# Patient Record
Sex: FEMALE | Race: WHITE | NOT HISPANIC OR LATINO | Employment: FULL TIME | URBAN - METROPOLITAN AREA
[De-identification: names, ages, dates, MRNs, and addresses within clinical notes are randomized per-mention and may not be internally consistent; named-entity substitution may affect disease eponyms.]

---

## 2017-10-24 ENCOUNTER — DOCUMENTATION ONLY (OUTPATIENT)
Dept: FAMILY MEDICINE | Facility: CLINIC | Age: 27
End: 2017-10-24

## 2017-10-24 NOTE — PROGRESS NOTES
Pre-Visit Chart Review  For Appointment Scheduled on 10/25/17    Health Maintenance Due   Topic Date Due    Lipid Panel  1990    TETANUS VACCINE  11/08/2006    Pap Smear  11/08/2009    Influenza Vaccine  08/01/2017

## 2017-10-27 ENCOUNTER — OFFICE VISIT (OUTPATIENT)
Dept: FAMILY MEDICINE | Facility: CLINIC | Age: 27
End: 2017-10-27
Payer: COMMERCIAL

## 2017-10-27 ENCOUNTER — LAB VISIT (OUTPATIENT)
Dept: LAB | Facility: HOSPITAL | Age: 27
End: 2017-10-27
Attending: NURSE PRACTITIONER
Payer: COMMERCIAL

## 2017-10-27 VITALS
HEIGHT: 67 IN | HEART RATE: 63 BPM | WEIGHT: 135.56 LBS | BODY MASS INDEX: 21.28 KG/M2 | DIASTOLIC BLOOD PRESSURE: 73 MMHG | TEMPERATURE: 98 F | SYSTOLIC BLOOD PRESSURE: 105 MMHG

## 2017-10-27 DIAGNOSIS — K58.9 IRRITABLE BOWEL SYNDROME, UNSPECIFIED TYPE: ICD-10-CM

## 2017-10-27 DIAGNOSIS — Z00.00 ANNUAL PHYSICAL EXAM: ICD-10-CM

## 2017-10-27 DIAGNOSIS — B35.1 ONYCHOMYCOSIS: ICD-10-CM

## 2017-10-27 DIAGNOSIS — Z00.00 ANNUAL PHYSICAL EXAM: Primary | ICD-10-CM

## 2017-10-27 LAB
ALBUMIN SERPL BCP-MCNC: 4.3 G/DL
ALP SERPL-CCNC: 36 U/L
ALT SERPL W/O P-5'-P-CCNC: 10 U/L
ANION GAP SERPL CALC-SCNC: 9 MMOL/L
AST SERPL-CCNC: 19 U/L
BASOPHILS # BLD AUTO: 0.05 K/UL
BASOPHILS NFR BLD: 0.7 %
BILIRUB SERPL-MCNC: 1.1 MG/DL
BUN SERPL-MCNC: 16 MG/DL
CALCIUM SERPL-MCNC: 9.8 MG/DL
CHLORIDE SERPL-SCNC: 106 MMOL/L
CHOLEST SERPL-MCNC: 198 MG/DL
CHOLEST/HDLC SERPL: 2.9 {RATIO}
CO2 SERPL-SCNC: 25 MMOL/L
CREAT SERPL-MCNC: 0.8 MG/DL
DIFFERENTIAL METHOD: ABNORMAL
EOSINOPHIL # BLD AUTO: 0.3 K/UL
EOSINOPHIL NFR BLD: 4.3 %
ERYTHROCYTE [DISTWIDTH] IN BLOOD BY AUTOMATED COUNT: 12.2 %
EST. GFR  (AFRICAN AMERICAN): >60 ML/MIN/1.73 M^2
EST. GFR  (NON AFRICAN AMERICAN): >60 ML/MIN/1.73 M^2
GLUCOSE SERPL-MCNC: 82 MG/DL
HCT VFR BLD AUTO: 35.7 %
HDLC SERPL-MCNC: 69 MG/DL
HDLC SERPL: 34.8 %
HGB BLD-MCNC: 11.9 G/DL
IMM GRANULOCYTES # BLD AUTO: 0.04 K/UL
IMM GRANULOCYTES NFR BLD AUTO: 0.6 %
LDLC SERPL CALC-MCNC: 118 MG/DL
LYMPHOCYTES # BLD AUTO: 2.1 K/UL
LYMPHOCYTES NFR BLD: 29.9 %
MCH RBC QN AUTO: 30.7 PG
MCHC RBC AUTO-ENTMCNC: 33.3 G/DL
MCV RBC AUTO: 92 FL
MONOCYTES # BLD AUTO: 0.7 K/UL
MONOCYTES NFR BLD: 10.6 %
NEUTROPHILS # BLD AUTO: 3.8 K/UL
NEUTROPHILS NFR BLD: 53.9 %
NONHDLC SERPL-MCNC: 129 MG/DL
NRBC BLD-RTO: 0 /100 WBC
PLATELET # BLD AUTO: 232 K/UL
PMV BLD AUTO: 11.5 FL
POTASSIUM SERPL-SCNC: 3.8 MMOL/L
PROT SERPL-MCNC: 7.2 G/DL
RBC # BLD AUTO: 3.88 M/UL
SODIUM SERPL-SCNC: 140 MMOL/L
TRIGL SERPL-MCNC: 55 MG/DL
WBC # BLD AUTO: 6.98 K/UL

## 2017-10-27 PROCEDURE — 80061 LIPID PANEL: CPT

## 2017-10-27 PROCEDURE — 36415 COLL VENOUS BLD VENIPUNCTURE: CPT | Mod: PO

## 2017-10-27 PROCEDURE — 80053 COMPREHEN METABOLIC PANEL: CPT

## 2017-10-27 PROCEDURE — 99385 PREV VISIT NEW AGE 18-39: CPT | Mod: S$GLB,,, | Performed by: NURSE PRACTITIONER

## 2017-10-27 PROCEDURE — 99999 PR PBB SHADOW E&M-EST. PATIENT-LVL III: CPT | Mod: PBBFAC,,, | Performed by: NURSE PRACTITIONER

## 2017-10-27 PROCEDURE — 85025 COMPLETE CBC W/AUTO DIFF WBC: CPT

## 2017-10-27 RX ORDER — PROPRANOLOL HYDROCHLORIDE 40 MG/1
20 TABLET ORAL DAILY PRN
COMMUNITY
End: 2019-12-10

## 2017-10-27 RX ORDER — TERBINAFINE HYDROCHLORIDE 250 MG/1
250 TABLET ORAL DAILY
Qty: 30 TABLET | Refills: 3 | Status: SHIPPED | OUTPATIENT
Start: 2017-10-27 | End: 2018-03-15

## 2017-10-27 RX ORDER — DICYCLOMINE HYDROCHLORIDE 10 MG/1
10 CAPSULE ORAL
Qty: 120 CAPSULE | Refills: 0 | Status: SHIPPED | OUTPATIENT
Start: 2017-10-27 | End: 2017-11-26

## 2017-10-27 NOTE — PATIENT INSTRUCTIONS
Prevention Guidelines, Women Ages 18 to 39  Screening tests and vaccines are an important part of managing your health. Health counseling is essential, too. Below are guidelines for these, for women ages 18 to 39. Talk with your healthcare provider to make sure youre up-to-date on what you need.  Screening Who needs it How often   Alcohol misuse All women in this age group At routine exams   Blood pressure All women in this age group Every 2 years if your blood pressure is less than 120/80 mm Hg; yearly if your systolic blood pressure is 120 to 139 mm Hg, or your diastolic blood pressure reading is 80 to 89 mm Hg   Breast cancer All women in this age group should talk with their healthcare providers about the need for clinical breast exams (CBE)1 Clinical breast exam every 3 years1   Cervical cancer Women ages 21 and older Women between ages 21 and 29 should have a Pap test every 3 years; women between ages 30 and 65 are advised to have a Pap test plus an HPV test every 5 years   Chlamydia Sexually active women ages 24 and younger, and women at increased risk for infection Every 3 years if you're at risk or have symptoms   Depression All women in this age group At routine exams   Diabetes mellitus, type 2 Adults with no symptoms who are overweight or obese and have 1 or more other risk factors for diabetes At least every 3 years   Gonorrhea Sexually active women at increased risk for infection At routine exams   Hepatitis C Anyone at increased risk At routine exams   HIV All women At routine exams   Obesity All women in this age group At routine exams   Syphilis Women at increased risk for infection - talk with your healthcare provider At routine exams   Tuberculosis Women at increased risk for infection - talk with your healthcare provider Ask your healthcare provider   Vision All women in this age group At least 1 complete exam in your 20s, and 2 in your 30s   Vaccine2 Who needs it How often   Chickenpox  (varicella) All women in this age group who have no record of this infection or vaccine 2 doses; the second dose should be given 4 to 8 weeks after the first dose   Hepatitis A Women at increased risk for infection - talk with your healthcare provider 2 doses given at least 6 months apart   Hepatitis B Women at increased risk for infection - talk with your healthcare provider 3 doses over 6 months; second dose should be given 1 month after the first dose; the third dose should be given at least 2 months after the second dose and at least 4 months after the first dose   Haemophilus influenzae Type B (HIB) Women at increased risk for infection - talk with your healthcare provider 1 to 3 doses   Human papillomavirus (HPV) All women in this age group up to age 26 3 doses; the second dose should be given 1 to 2 months after the first dose and the third dose given 6 months after the first dose   Influenza (flu) All women in this age group Once a year   Measles, mumps, rubella (MMR) All women in this age group who have no record of these infections or vaccines 1 or 2 doses   Meningococcal Women at increased risk for infection - talk with your healthcare provider 1 or more doses   Pneumococcal conjugate vaccine (PCV13) and pneumococcal polysaccharide vaccine (PPSV23) Women at increased risk for infection - talk with your healthcare provider PCV13: 1 dose ages 19 to 65 (protects against 13 types of pneumococcal bacteria)  PPSV23: 1 to 2 doses through age 64, or 1 dose at 65 or older (protects against 23 types of pneumococcal bacteria)      Tetanus/diphtheria/pertussis (Td/Tdap) booster All women in this age group Td every 10 years, or a one-time dose of Tdap instead of a Td booster after age 18, then Td every 10 years   Counseling Who needs it How often   BRCA gene mutation testing for breast and ovarian cancer susceptibility Women with increased risk for having gene mutation When your risk is known   Breast cancer and  chemoprevention Women at high risk for breast cancer When your risk is known   Diet and exercise Women who are overweight or obese When diagnosed, and then at routine exams   Domestic violence Women at the age in which they are able to have children At routine exams   Sexually transmitted infection prevention Women who are sexually active At routine exams   Skin cancer Prevention of skin cancer in fair-skinned adults through age 24 At routine exams   Use of tobacco and the health effects it can cause All women in this age group Every visit   1According to the ACS, women ages 20 to 39 years should have a clinical breast exam (CBE) as part of their routine health exam every 3 years. Breast self-exams are an option for women starting in their 20s. But the  USPSTF does not recommend CBE.  2Those who are 18 years old and not up-to-date on their childhood vaccines should get all appropriate catch-up vaccines recommended by the CDC.  Date Last Reviewed: 8/27/2015  © 1278-5408 [employer] [address] [place] [address], [place] [address]. All rights reserved. This information is not intended as a substitute for professional medical care. Always follow your healthcare professional's instructions.

## 2017-10-27 NOTE — PROGRESS NOTES
Subjective:       Patient ID: Annalise Nava is a 26 y.o. female.    Chief Complaint: Annual Exam    Ms. Nava presents to the clinic today for annual exam.  She has history of tremor which is controlled with propranolol.  She takes this when she has anxiety.  She complains of left great toe nail brittle and yellow.  She has history of onychomycosis and would like to take terbinafine which she has taken in the past for onychomycosis.  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  She works night shift.  She exercises on her off weeks by lifting weights.  She eats a Paleo diet.  She complains of occasional diarrhea which occurs when she is anxious or stressed.  She also has occasional cramping pains located on the LLQ.  This woke her up one time at night but has not happened since.  No family history IBD.  No blood in the stool.  Pain went away on its own.  She is up to date on Tdap which she had in college.  She had pap smear one year ago.  She had flu shot at work.       Review of Systems   Constitutional: Negative for activity change, chills, fatigue and unexpected weight change.   HENT: Negative for hearing loss, rhinorrhea and trouble swallowing.    Eyes: Negative for discharge and visual disturbance.   Respiratory: Negative for cough, chest tightness, shortness of breath and wheezing.    Cardiovascular: Positive for palpitations (when drinking caffeine or anxious). Negative for chest pain.   Gastrointestinal: Positive for abdominal pain (cramping, once every 3 mos). Negative for anal bleeding, blood in stool, constipation, diarrhea and vomiting.   Endocrine: Negative for polydipsia and polyuria.   Genitourinary: Negative for difficulty urinating, dysuria, hematuria and menstrual problem.   Musculoskeletal: Negative for arthralgias, joint swelling and neck pain.   Neurological: Positive for headaches. Negative for weakness.   Psychiatric/Behavioral: Negative for confusion and dysphoric mood.        Objective:      Physical Exam   Constitutional: She is oriented to person, place, and time. She appears well-nourished. No distress.   HENT:   Head: Normocephalic and atraumatic.   Right Ear: External ear normal.   Left Ear: External ear normal.   Mouth/Throat: Oropharynx is clear and moist. No oropharyngeal exudate.   Eyes: Pupils are equal, round, and reactive to light. Right eye exhibits no discharge. Left eye exhibits no discharge.   Neck: Neck supple. No thyromegaly present.   Cardiovascular: Normal rate and regular rhythm.  Exam reveals no gallop and no friction rub.    No murmur heard.  Pulmonary/Chest: Effort normal and breath sounds normal. No respiratory distress. She has no wheezes. She has no rales.   Abdominal: Soft. She exhibits no distension. There is no tenderness.   Lymphadenopathy:     She has no cervical adenopathy.   Neurological: She is alert and oriented to person, place, and time. Coordination normal.   Skin: Skin is warm and dry.   Psychiatric: She has a normal mood and affect. Her behavior is normal. Thought content normal.   Vitals reviewed.          Current Outpatient Prescriptions:     multivitamin with minerals tablet, Take 1 tablet by mouth once daily., Disp: , Rfl:     propranolol (INDERAL) 40 MG tablet, Take 20 mg by mouth daily as needed., Disp: , Rfl:     dicyclomine (BENTYL) 10 MG capsule, Take 1 capsule (10 mg total) by mouth 4 (four) times daily before meals and nightly., Disp: 120 capsule, Rfl: 0    terbinafine HCl (LAMISIL) 250 mg tablet, Take 1 tablet (250 mg total) by mouth once daily., Disp: 30 tablet, Rfl: 3  Assessment:       1. Annual physical exam    2. Onychomycosis    3. Irritable bowel syndrome, unspecified type        Plan:       Annual physical exam  -     CBC auto differential; Future; Expected date: 10/27/2017  -     Comprehensive metabolic panel; Future; Expected date: 10/27/2017  -     Lipid panel; Future; Expected date: 10/27/2017    Onychomycosis  Take  for 12 weeks.    Hepatic function panel monthly during treatment.  -     terbinafine HCl (LAMISIL) 250 mg tablet; Take 1 tablet (250 mg total) by mouth once daily.  Dispense: 30 tablet; Refill: 3    Irritable bowel syndrome, unspecified type  GI referral if pain or diarrhea continues to wake up at night.  -     dicyclomine (BENTYL) 10 MG capsule; Take 1 capsule (10 mg total) by mouth 4 (four) times daily before meals and nightly.  Dispense: 120 capsule; Refill: 0

## 2017-10-28 DIAGNOSIS — Z51.81 ENCOUNTER FOR MEDICATION MONITORING: Primary | ICD-10-CM

## 2017-11-16 ENCOUNTER — PATIENT MESSAGE (OUTPATIENT)
Dept: FAMILY MEDICINE | Facility: CLINIC | Age: 27
End: 2017-11-16

## 2017-11-17 DIAGNOSIS — D50.0 IRON DEFICIENCY ANEMIA DUE TO CHRONIC BLOOD LOSS: Primary | ICD-10-CM

## 2017-12-04 ENCOUNTER — PATIENT MESSAGE (OUTPATIENT)
Dept: FAMILY MEDICINE | Facility: CLINIC | Age: 27
End: 2017-12-04

## 2017-12-04 DIAGNOSIS — R10.9 ABDOMINAL PAIN, UNSPECIFIED ABDOMINAL LOCATION: Primary | ICD-10-CM

## 2017-12-05 ENCOUNTER — LAB VISIT (OUTPATIENT)
Dept: LAB | Facility: HOSPITAL | Age: 27
End: 2017-12-05
Attending: NURSE PRACTITIONER
Payer: COMMERCIAL

## 2017-12-05 DIAGNOSIS — Z51.81 ENCOUNTER FOR MEDICATION MONITORING: ICD-10-CM

## 2017-12-05 DIAGNOSIS — D50.0 IRON DEFICIENCY ANEMIA DUE TO CHRONIC BLOOD LOSS: ICD-10-CM

## 2017-12-05 LAB
ALBUMIN SERPL BCP-MCNC: 4.5 G/DL
ALP SERPL-CCNC: 39 U/L
ALT SERPL W/O P-5'-P-CCNC: 16 U/L
AST SERPL-CCNC: 21 U/L
BASOPHILS # BLD AUTO: 0 K/UL
BASOPHILS NFR BLD: 0.5 %
BILIRUB DIRECT SERPL-MCNC: 0.3 MG/DL
BILIRUB SERPL-MCNC: 0.8 MG/DL
DIFFERENTIAL METHOD: ABNORMAL
EOSINOPHIL # BLD AUTO: 0.1 K/UL
EOSINOPHIL NFR BLD: 1.3 %
ERYTHROCYTE [DISTWIDTH] IN BLOOD BY AUTOMATED COUNT: 12.7 %
FERRITIN SERPL-MCNC: 75 NG/ML
HCT VFR BLD AUTO: 35.5 %
HGB BLD-MCNC: 12 G/DL
IRON SERPL-MCNC: 80 UG/DL
LYMPHOCYTES # BLD AUTO: 1.2 K/UL
LYMPHOCYTES NFR BLD: 14.8 %
MCH RBC QN AUTO: 30.5 PG
MCHC RBC AUTO-ENTMCNC: 33.8 G/DL
MCV RBC AUTO: 90 FL
MONOCYTES # BLD AUTO: 0.6 K/UL
MONOCYTES NFR BLD: 7.9 %
NEUTROPHILS # BLD AUTO: 6 K/UL
NEUTROPHILS NFR BLD: 75.5 %
PLATELET # BLD AUTO: 216 K/UL
PMV BLD AUTO: 8.7 FL
PROT SERPL-MCNC: 7.5 G/DL
RBC # BLD AUTO: 3.93 M/UL
SATURATED IRON: 21 %
TOTAL IRON BINDING CAPACITY: 373 UG/DL
TRANSFERRIN SERPL-MCNC: 252 MG/DL
WBC # BLD AUTO: 7.9 K/UL

## 2017-12-05 PROCEDURE — 36415 COLL VENOUS BLD VENIPUNCTURE: CPT

## 2017-12-05 PROCEDURE — 83540 ASSAY OF IRON: CPT

## 2017-12-05 PROCEDURE — 82728 ASSAY OF FERRITIN: CPT

## 2017-12-05 PROCEDURE — 80076 HEPATIC FUNCTION PANEL: CPT

## 2017-12-05 PROCEDURE — 85025 COMPLETE CBC W/AUTO DIFF WBC: CPT

## 2017-12-06 ENCOUNTER — PATIENT MESSAGE (OUTPATIENT)
Dept: FAMILY MEDICINE | Facility: CLINIC | Age: 27
End: 2017-12-06

## 2017-12-06 ENCOUNTER — TELEPHONE (OUTPATIENT)
Dept: FAMILY MEDICINE | Facility: CLINIC | Age: 27
End: 2017-12-06

## 2017-12-22 ENCOUNTER — OFFICE VISIT (OUTPATIENT)
Dept: GASTROENTEROLOGY | Facility: CLINIC | Age: 27
End: 2017-12-22
Payer: COMMERCIAL

## 2017-12-22 VITALS
BODY MASS INDEX: 21.45 KG/M2 | DIASTOLIC BLOOD PRESSURE: 84 MMHG | SYSTOLIC BLOOD PRESSURE: 109 MMHG | HEIGHT: 67 IN | WEIGHT: 136.69 LBS | HEART RATE: 76 BPM

## 2017-12-22 DIAGNOSIS — K58.0 IRRITABLE BOWEL SYNDROME WITH DIARRHEA: ICD-10-CM

## 2017-12-22 DIAGNOSIS — R19.7 DIARRHEA, UNSPECIFIED TYPE: ICD-10-CM

## 2017-12-22 DIAGNOSIS — R10.9 ABDOMINAL PAIN, UNSPECIFIED ABDOMINAL LOCATION: Primary | ICD-10-CM

## 2017-12-22 PROCEDURE — 99243 OFF/OP CNSLTJ NEW/EST LOW 30: CPT | Mod: S$GLB,,, | Performed by: INTERNAL MEDICINE

## 2017-12-22 PROCEDURE — 99999 PR PBB SHADOW E&M-EST. PATIENT-LVL III: CPT | Mod: PBBFAC,,, | Performed by: INTERNAL MEDICINE

## 2017-12-22 RX ORDER — DICYCLOMINE HYDROCHLORIDE 10 MG/1
10 CAPSULE ORAL
COMMUNITY
End: 2018-04-27 | Stop reason: SDUPTHER

## 2017-12-22 NOTE — PROGRESS NOTES
Subjective:       Patient ID: Annalise Nava is a 27 y.o. female.    This patient is new to me.  Referring practitioner:  Latricia Ncihols for abdominal pain.      Chief Complaint: Abdominal Cramping (loose stools, with intermittent abdominal cramping )    Abdominal Cramping   This is a new problem. The current episode started more than 1 year ago. The onset quality is undetermined. The problem occurs intermittently. Duration: 1-2 days. The problem has been resolved. The pain is located in the LLQ and LUQ. The pain is at a severity of 6/10. The pain is moderate. The quality of the pain is aching and cramping. The abdominal pain does not radiate. Associated symptoms include diarrhea (currently resolved but will have loose more frequent BMs during episode.  Normal bowel habit is 4 times per day with no weight changes or bleeding). Pertinent negatives include no arthralgias, dysuria, fever, headaches, hematuria, myalgias, nausea or vomiting. Nothing aggravates the pain. The pain is relieved by nothing. Treatments tried: diet modification, bentyl. The treatment provided mild relief. Prior diagnostic workup includes GI consult. Her past medical history is significant for irritable bowel syndrome. There is no history of abdominal surgery.     Review of Systems   Constitutional: Negative for chills, fatigue and fever.   HENT: Negative for sore throat and trouble swallowing.    Respiratory: Negative for cough, shortness of breath and wheezing.    Cardiovascular: Negative for chest pain and palpitations.   Gastrointestinal: Positive for abdominal pain (currently resolved) and diarrhea (currently resolved but will have loose more frequent BMs during episode.  Normal bowel habit is 4 times per day with no weight changes or bleeding). Negative for blood in stool, nausea and vomiting.   Genitourinary: Negative for dysuria and hematuria.   Musculoskeletal: Negative for arthralgias and myalgias.   Skin: Negative for color change  "and rash.   Neurological: Negative for dizziness and headaches.   Hematological: Negative for adenopathy.   Psychiatric/Behavioral: Negative for confusion. The patient is not nervous/anxious.    All other systems reviewed and are negative.      Objective:       Vitals:    12/22/17 1042   BP: 109/84   Pulse: 76   Weight: 62 kg (136 lb 11 oz)   Height: 5' 7" (1.702 m)       Physical Exam   Constitutional: She appears well-developed and well-nourished.   HENT:   Head: Normocephalic and atraumatic.   Eyes: Pupils are equal, round, and reactive to light. No scleral icterus.   Neck: Normal range of motion.   Cardiovascular: Normal rate and regular rhythm.    No murmur heard.  Pulmonary/Chest: Effort normal and breath sounds normal. She has no wheezes.   Abdominal: Soft. Bowel sounds are normal. She exhibits no distension. There is no tenderness.   Musculoskeletal: She exhibits no edema or tenderness.   Lymphadenopathy:     She has no cervical adenopathy.   Neurological: She is alert.   Skin: Skin is warm and dry. No rash noted.         Lab Results   Component Value Date    WBC 7.90 12/05/2017    HGB 12.0 12/05/2017    HCT 35.5 (L) 12/05/2017    MCV 90 12/05/2017     12/05/2017       CMP  Sodium   Date Value Ref Range Status   10/27/2017 140 136 - 145 mmol/L Final     Potassium   Date Value Ref Range Status   10/27/2017 3.8 3.5 - 5.1 mmol/L Final     Chloride   Date Value Ref Range Status   10/27/2017 106 95 - 110 mmol/L Final     CO2   Date Value Ref Range Status   10/27/2017 25 23 - 29 mmol/L Final     Glucose   Date Value Ref Range Status   10/27/2017 82 70 - 110 mg/dL Final     BUN, Bld   Date Value Ref Range Status   10/27/2017 16 6 - 20 mg/dL Final     Creatinine   Date Value Ref Range Status   10/27/2017 0.8 0.5 - 1.4 mg/dL Final     Calcium   Date Value Ref Range Status   10/27/2017 9.8 8.7 - 10.5 mg/dL Final     Total Protein   Date Value Ref Range Status   12/05/2017 7.5 6.0 - 8.4 g/dL Final     Albumin "   Date Value Ref Range Status   12/05/2017 4.5 3.5 - 5.2 g/dL Final     Total Bilirubin   Date Value Ref Range Status   12/05/2017 0.8 0.1 - 1.0 mg/dL Final     Comment:     For infants and newborns, interpretation of results should be based  on gestational age, weight and in agreement with clinical  observations.  Premature Infant recommended reference ranges:  Up to 24 hours.............<8.0 mg/dL  Up to 48 hours............<12.0 mg/dL  3-5 days..................<15.0 mg/dL  6-29 days.................<15.0 mg/dL       Alkaline Phosphatase   Date Value Ref Range Status   12/05/2017 39 (L) 55 - 135 U/L Final     AST   Date Value Ref Range Status   12/05/2017 21 10 - 40 U/L Final     ALT   Date Value Ref Range Status   12/05/2017 16 10 - 44 U/L Final     Anion Gap   Date Value Ref Range Status   10/27/2017 9 8 - 16 mmol/L Final     eGFR if    Date Value Ref Range Status   10/27/2017 >60.0 >60 mL/min/1.73 m^2 Final     eGFR if non    Date Value Ref Range Status   10/27/2017 >60.0 >60 mL/min/1.73 m^2 Final     Comment:     Calculation used to obtain the estimated glomerular filtration  rate (eGFR) is the CKD-EPI equation. Since race is unknown   in our information system, the eGFR values for   -American and Non--American patients are given   for each creatinine result.         ECG was independently visualized and reviewed by me and showed NSR with sinus arrhythmia.      Old records from [facility] reviewed and are as summarized above in the HPI.      Assessment:       1. Abdominal pain, unspecified abdominal location    2. Diarrhea, unspecified type    3. Irritable bowel syndrome with diarrhea        Plan:       1.  Check celiac screen  2.  Check stool studies if symptoms recur  3.  Increase bentyl to 20 mg PRN four times daily  4.  Avoid NSAIDs  5.  If diarrhea persists, consider colonoscopy with biopsy but patient wishes to defer for now given the absence of alarm  features  6.  Follow up PRN  7.  Communication will be sent to the referring practitioner regarding my assessment and plan on this patient via EPIC.

## 2017-12-22 NOTE — LETTER
December 22, 2017        Latricia Nichols, [facility]  [address]  [place] [address]             [place] [facility]  [address]  [place] [address]  Phone: [phone]   Patient: Annalise Nava   MR Number: [id number]   YOB: 1990   Date of Visit: 12/22/2017       Dear Dr. Nichols:    Thank you for referring Annalise Nava to me for evaluation. Below are the relevant portions of my assessment and plan of care.            If you have questions, please do not hesitate to call me. I look forward to following Annalise along with you.    Sincerely,      Deejay Cortes MD           CC  No Recipients

## 2018-03-15 ENCOUNTER — OFFICE VISIT (OUTPATIENT)
Dept: PODIATRY | Facility: CLINIC | Age: 28
End: 2018-03-15
Payer: COMMERCIAL

## 2018-03-15 ENCOUNTER — TELEPHONE (OUTPATIENT)
Dept: PODIATRY | Facility: CLINIC | Age: 28
End: 2018-03-15

## 2018-03-15 VITALS — HEIGHT: 67 IN | WEIGHT: 134.94 LBS | BODY MASS INDEX: 21.18 KG/M2

## 2018-03-15 DIAGNOSIS — L03.031 PARONYCHIA, TOE, RIGHT: ICD-10-CM

## 2018-03-15 DIAGNOSIS — L60.0 INGROWN NAIL: Primary | ICD-10-CM

## 2018-03-15 DIAGNOSIS — M79.674 TOE PAIN, RIGHT: ICD-10-CM

## 2018-03-15 PROCEDURE — 99999 PR PBB SHADOW E&M-EST. PATIENT-LVL III: CPT | Mod: PBBFAC,,, | Performed by: PODIATRIST

## 2018-03-15 PROCEDURE — 99203 OFFICE O/P NEW LOW 30 MIN: CPT | Mod: S$GLB,,, | Performed by: PODIATRIST

## 2018-03-15 RX ORDER — TOBRAMYCIN 3 MG/ML
SOLUTION/ DROPS OPHTHALMIC
Qty: 5 ML | Refills: 3 | Status: SHIPPED | OUTPATIENT
Start: 2018-03-15 | End: 2018-04-27

## 2018-03-15 NOTE — TELEPHONE ENCOUNTER
----- Message from Roland Kennedy sent at 3/15/2018  3:35 PM CDT -----  Contact: Axel cline/Walter Reyna calling to verify a prescription that was sent over for pt  Call Back#[phone]  Thanks

## 2018-03-15 NOTE — LETTER
March 15, 2018      Other  [address] [place] [address]           [place] [facility]  [address]  [place] [address]  Phone: [phone]          Patient: Annalise Nava   MR Number: [id number]   YOB: 1990   Date of Visit: 3/15/2018       Dear Other:    Thank you for referring Annalise Nava to me for evaluation. Attached you will find relevant portions of my assessment and plan of care.    If you have questions, please do not hesitate to call me. I look forward to following Annalise Nava along with you.    Sincerely,    Jose Bocanegra, LORETA    Enclosure  CC:  No Recipients    If you would like to receive this communication electronically, please contact [email or web address] or [phone] to request more information on [facility] [facility] access.    For providers and/or their staff who would like to refer a patient to [facility], please contact us through our one-stop-shop provider referral line, [facility], at [phone].    If you feel you have received this communication in error or would no longer like to receive these types of communications, please e-mail [email or web address]

## 2018-03-15 NOTE — TELEPHONE ENCOUNTER
Spoke with Axel. Axel wanted to verify the instructions of the rx. Informed Axel that rx was written correctly and that  does indeed want the pt to inserts drops in the toe as directed.

## 2018-03-15 NOTE — PROGRESS NOTES
Subjective:      Patient ID: Annalise Nava is a 27 y.o. female.    Chief Complaint: Ingrown Toenail (right medial border)    Sharp deep pain medial right hallux border from ingrown nail.  Gradual onset, worsening over past several weeks, aggravated by increased weight bearing, shoe gear, pressure.  No previous medical treatment.  OTC pain med not helping.  Denies trauma, surgery right hallux.  Matrixectomy left helped many years now.    Review of Systems   Constitution: Negative for chills, diaphoresis, fever, malaise/fatigue and night sweats.   Cardiovascular: Negative for claudication, cyanosis, leg swelling and syncope.   Skin: Positive for nail changes. Negative for color change, dry skin, rash, suspicious lesions and unusual hair distribution.   Musculoskeletal: Negative for falls, joint pain, joint swelling, muscle cramps, muscle weakness and stiffness.   Gastrointestinal: Negative for constipation, diarrhea, nausea and vomiting.   Neurological: Negative for brief paralysis, disturbances in coordination, focal weakness, numbness, paresthesias, sensory change and tremors.           Objective:      Physical Exam   Constitutional: She is oriented to person, place, and time. She appears well-developed and well-nourished. She is cooperative. No distress.   Cardiovascular:   Pulses:       Popliteal pulses are 2+ on the right side, and 2+ on the left side.        Dorsalis pedis pulses are 2+ on the right side, and 2+ on the left side.        Posterior tibial pulses are 2+ on the right side, and 2+ on the left side.   Capillary refill 3 seconds all toes/distal feet, all toes/both feet warm to touch.      Negative lymphadenopathy bilateral popliteal fossa and tarsal tunnel.      Negavie lower extremity edema bilateral.     Musculoskeletal:        Right ankle: She exhibits normal range of motion, no swelling, no ecchymosis, no deformity, no laceration and normal pulse. Achilles tendon normal. Achilles tendon  exhibits no pain, no defect and normal Shine's test results.   Normal angle, base, station of gait. All ten toes without clubbing, cyanosis, or signs of ischemia.  No pain to palpation bilateral lower extremities.  Range of motion, stability, muscle strength, and muscle tone normal bilateral feet and legs.     Lymphadenopathy:   Negative lymphadenopathy bilateral popliteal fossa and tarsal tunnel.    Negative lymphangitic streaking bilateral feet/ankles/legs.   Neurological: She is alert and oriented to person, place, and time. She has normal strength. She displays no atrophy and no tremor. No sensory deficit. She exhibits normal muscle tone. Gait normal.   Reflex Scores:       Patellar reflexes are 2+ on the right side and 2+ on the left side.       Achilles reflexes are 2+ on the right side and 2+ on the left side.  Negative tinel sign to percussion sural, superficial peroneal, deep peroneal, saphenous, and posterior tibial nerves right and left ankles and feet.     Skin: Skin is warm, dry and intact. Capillary refill takes 2 to 3 seconds. No abrasion, no bruising, no burn, no ecchymosis, no laceration, no lesion and no rash noted. She is not diaphoretic. No cyanosis or erythema. No pallor. Nails show no clubbing.     Skin is normal age and health appropriate color, turgor, texture, and temperature bilateral lower extremities without ulceration, hyperpigmentation, discoloration, masses nodules or cords palpated.  No ecchymosis, erythema, edema, or cardinal signs of infection bilateral lower extremities.    Visible and palpable ingrowth of toenail medial border right hallux with pain to palpation, and focal localized erythema and edema,  without ulceration, drainage, pus, tracking, fluctuance, malodor, or cardinal signs infection.     Psychiatric: She has a normal mood and affect.             Assessment:       Encounter Diagnoses   Name Primary?    Ingrown nail Yes    Toe pain, right     Paronychia, toe,  right          Plan:       Annalise was seen today for ingrown toenail.    Diagnoses and all orders for this visit:    Ingrown nail    Toe pain, right    Paronychia, toe, right    Other orders  -     tobramycin sulfate 0.3% (TOBREX) 0.3 % ophthalmic solution; 1-2 drops topically twice daily to affected toe(s).      I counseled the patient on her conditions, their implications and medical management.        Rx tobramycin drops bid.  Dress right hallux all times with band aid dressing changing daily.    Discussed conservative treatment with shoes of adequate dimensions, material, and style to alleviate symptoms and delay or prevent surgical intervention.            Follow-up if symptoms worsen or fail to improve, for declines scheduled follow up.

## 2018-04-26 ENCOUNTER — DOCUMENTATION ONLY (OUTPATIENT)
Dept: FAMILY MEDICINE | Facility: CLINIC | Age: 28
End: 2018-04-26

## 2018-04-26 NOTE — PROGRESS NOTES
Pre-Visit Chart Review  For Appointment Scheduled on 4-27-18    Health Maintenance Due   Topic Date Due    TETANUS VACCINE  11/08/2006    Pap Smear  11/08/2009

## 2018-04-27 ENCOUNTER — OFFICE VISIT (OUTPATIENT)
Dept: FAMILY MEDICINE | Facility: CLINIC | Age: 28
End: 2018-04-27
Payer: COMMERCIAL

## 2018-04-27 VITALS
HEART RATE: 56 BPM | WEIGHT: 136.44 LBS | DIASTOLIC BLOOD PRESSURE: 64 MMHG | TEMPERATURE: 98 F | SYSTOLIC BLOOD PRESSURE: 100 MMHG | HEIGHT: 67 IN | BODY MASS INDEX: 21.42 KG/M2

## 2018-04-27 DIAGNOSIS — G25.0 ESSENTIAL TREMOR: ICD-10-CM

## 2018-04-27 DIAGNOSIS — Z01.419 WELL WOMAN EXAM: ICD-10-CM

## 2018-04-27 DIAGNOSIS — K58.9 IRRITABLE BOWEL SYNDROME, UNSPECIFIED TYPE: Primary | ICD-10-CM

## 2018-04-27 DIAGNOSIS — Z82.49 FAMILY HISTORY OF EARLY CAD: ICD-10-CM

## 2018-04-27 PROCEDURE — 99999 PR PBB SHADOW E&M-EST. PATIENT-LVL III: CPT | Mod: PBBFAC,,, | Performed by: FAMILY MEDICINE

## 2018-04-27 PROCEDURE — 99214 OFFICE O/P EST MOD 30 MIN: CPT | Mod: S$GLB,,, | Performed by: FAMILY MEDICINE

## 2018-04-27 RX ORDER — DICYCLOMINE HYDROCHLORIDE 10 MG/1
20 CAPSULE ORAL
Qty: 40 CAPSULE | Status: SHIPPED | OUTPATIENT
Start: 2018-04-27 | End: 2020-12-31

## 2018-04-27 NOTE — PROGRESS NOTES
Subjective:       Patient ID: Annalise Nava is a 27 y.o. female.    Chief Complaint: Establish Care    Patient Active Problem List   Diagnosis    Irritable bowel syndrome    Essential tremor    Family history of early CAD     HPI  Review of Systems   Constitutional: Negative for activity change and unexpected weight change.   HENT: Negative for hearing loss, rhinorrhea and trouble swallowing.    Eyes: Negative for discharge and visual disturbance.   Respiratory: Negative for chest tightness and wheezing.    Cardiovascular: Negative for chest pain and palpitations.   Gastrointestinal: Negative for blood in stool, constipation, diarrhea and vomiting.   Endocrine: Negative for polydipsia and polyuria.   Genitourinary: Negative for difficulty urinating, dysuria, hematuria and menstrual problem.   Musculoskeletal: Negative for arthralgias, joint swelling and neck pain.   Neurological: Negative for weakness and headaches.   Psychiatric/Behavioral: Negative for confusion and dysphoric mood.       Objective:      Physical Exam   Constitutional: She is oriented to person, place, and time. She appears well-developed and well-nourished.   Cardiovascular: Normal rate, regular rhythm and normal heart sounds.    Pulmonary/Chest: Effort normal and breath sounds normal.   Musculoskeletal: She exhibits no edema.   Neurological: She is alert and oriented to person, place, and time.   Skin: Skin is warm and dry.   Psychiatric: She has a normal mood and affect.   Nursing note and vitals reviewed.      Assessment:       1. Irritable bowel syndrome, unspecified type    2. Essential tremor    3. Well woman exam    4. Family history of early CAD        Plan:       1. Irritable bowel syndrome, unspecified type  Counseled patient on IBS diagnosis, treatment options including probiotics, fiber supplements, anti-spasmodics, anti depressants and antibiotics.  Discussed side effects, risks and benefits of each.  Printed materials were  given and FODMAPS diet was discussed. Patient elected to proceed     - dicyclomine (BENTYL) 10 MG capsule; Take 2 capsules (20 mg total) by mouth 4 (four) times daily before meals and nightly.  Dispense: 40 capsule; Refill: prn    2. Essential tremor  Use propanolol prn    3. Well woman exam  Refer for WWE  - Ambulatory referral to Obstetrics / Gynecology    4. Family history of early CAD  Screen homocysteine and hs crp on next labs.     Reeval annually

## 2018-05-14 ENCOUNTER — OFFICE VISIT (OUTPATIENT)
Dept: FAMILY MEDICINE | Facility: CLINIC | Age: 28
End: 2018-05-14
Payer: COMMERCIAL

## 2018-05-14 VITALS
HEART RATE: 68 BPM | SYSTOLIC BLOOD PRESSURE: 123 MMHG | DIASTOLIC BLOOD PRESSURE: 77 MMHG | HEIGHT: 67 IN | BODY MASS INDEX: 21.84 KG/M2 | WEIGHT: 139.13 LBS

## 2018-05-14 DIAGNOSIS — Z30.09 FAMILY PLANNING COUNSELING: ICD-10-CM

## 2018-05-14 DIAGNOSIS — Z01.419 ENCOUNTER FOR WELL WOMAN EXAM: Primary | ICD-10-CM

## 2018-05-14 PROCEDURE — 88175 CYTOPATH C/V AUTO FLUID REDO: CPT

## 2018-05-14 PROCEDURE — 99999 PR PBB SHADOW E&M-EST. PATIENT-LVL III: CPT | Mod: PBBFAC,,, | Performed by: NURSE PRACTITIONER

## 2018-05-14 PROCEDURE — 99385 PREV VISIT NEW AGE 18-39: CPT | Mod: S$GLB,,, | Performed by: NURSE PRACTITIONER

## 2018-05-14 NOTE — PROGRESS NOTES
Chief Complaint   Patient presents with    Well Woman       History of Present Illness: Annalise Nava is a 27 y.o. female that presents today 5/14/2018 for well gyn visit.  Pt presents today for well woman exam. She denies any abnormal pap smears in the past. She reports cycles every 28-30 days with no associated complaints. She has been actively trying to conceive with ovulation predictor kits for the past 2 months without success and would like to discuss. Pt does not desire STD testing. No other complaints or concerns noted.        No past medical history on file.    No past surgical history on file.    Family History   Problem Relation Age of Onset    Mitral valve prolapse Sister        Social History     Social History    Marital status:      Spouse name: N/A    Number of children: N/A    Years of education: N/A     Social History Main Topics    Smoking status: Never Smoker    Smokeless tobacco: Never Used    Alcohol use Yes      Comment: occasional    Drug use: No    Sexual activity: Yes     Partners: Male     Other Topics Concern    Not on file     Social History Narrative    No narrative on file       OB History   No data available       Current Outpatient Prescriptions   Medication Sig Dispense Refill    dicyclomine (BENTYL) 10 MG capsule Take 2 capsules (20 mg total) by mouth 4 (four) times daily before meals and nightly. 40 capsule prn    multivitamin with minerals tablet Take 1 tablet by mouth once daily.      propranolol (INDERAL) 40 MG tablet Take 20 mg by mouth daily as needed.       No current facility-administered medications for this visit.        Review of patient's allergies indicates:  No Known Allergies    Review of Symptoms:  GENERAL: Denies weight gain or weight loss. Feeling well overall.   SKIN: Denies rash or lesions.   HEAD: Denies head injury or headache.   ABDOMEN: No abdominal pain, constipation, diarrhea, nausea, vomiting or rectal bleeding.   URINARY: No  "frequency, dysuria, hematuria, or burning on urination.    /77   Pulse 68   Ht 5' 7" (1.702 m)   Wt 63.1 kg (139 lb 1.8 oz)   LMP 04/16/2018     Physical Exam:  APPEARANCE: Well nourished, well developed, in no acute distress.  SKIN: Normal skin turgor, no lesions.  RESPIRATORY: Normal respiratory effort with no retractions or use of accessory muscles  ABDOMEN: Soft. No tenderness or masses. No hepatosplenomegaly. No hernias.  BREASTS: Symmetrical, no skin changes or visible lesions. No palpable masses, nipple discharge or adenopathy bilaterally.  PELVIC: Normal external female genitalia without lesions. Normal hair distribution. Adequate perineal body, normal urethral meatus. Urethra with no masses.  Bladder nontender. Vagina moist and well rugated without lesions or discharge. Cervix pink and without lesions. No significant cystocele or rectocele. Bimanual exam showed uterus normal size, shape, position, mobile and nontender. Adnexa without masses or tenderness. Urethra and bladder normal. PAP DONE    ASSESSMENT/PLAN:  Encounter for well woman exam  -     Liquid-based pap smear, screening    Family planning counseling      -Discussed with pt that it can potentially take up to 1 year to conceive in a healthy young woman. Instructed her to continue tracking her cycles and using the ovulation predictor kits at this time. Pt verbalized understanding.     Patient was counseled today on Pap guidelines, recommendation for pelvic exams, mammograms starting annually at age 40, Colonoscopy after the age of 50, Dexa Bone Scan and calcium and vitamin D supplementation in menopause and to see her PCP for other health maintenance.       Follow-up:  RTC in 1 year for well woman exam or as needed  "

## 2018-05-14 NOTE — LETTER
May 14, 2018      Ene Rene MD  [address]  [place] [address]           [facility]  [address]  [place] [address]  Phone: [phone]  Fax: [phone]          Patient: Annalise Nava   MR Number: [id number]   YOB: 1990   Date of Visit: 5/14/2018       Dear Dr. Ene Rene:    Thank you for referring Annalise Nava to me for evaluation. Attached you will find relevant portions of my assessment and plan of care.    If you have questions, please do not hesitate to call me. I look forward to following Annalise Nava along with you.    Sincerely,    Lisa Alonzo, CRISELDA    Enclosure  CC:  No Recipients    If you would like to receive this communication electronically, please contact [email or web address] or [phone] to request more information on [employer] Link access.    For providers and/or their staff who would like to refer a patient to [facility], please contact us through our one-stop-shop provider referral line, [facility], at [phone].    If you feel you have received this communication in error or would no longer like to receive these types of communications, please e-mail [email or web address]

## 2018-06-06 ENCOUNTER — PATIENT MESSAGE (OUTPATIENT)
Dept: PODIATRY | Facility: CLINIC | Age: 28
End: 2018-06-06

## 2018-06-06 ENCOUNTER — TELEPHONE (OUTPATIENT)
Dept: PODIATRY | Facility: CLINIC | Age: 28
End: 2018-06-06

## 2018-06-06 NOTE — TELEPHONE ENCOUNTER
----- Message from Mari Sellers sent at 6/6/2018  8:45 AM CDT -----  Contact: self [phone]  Call placed to pod. Patient returned your call, please call back.  Thank you!

## 2018-06-07 ENCOUNTER — OFFICE VISIT (OUTPATIENT)
Dept: PODIATRY | Facility: CLINIC | Age: 28
End: 2018-06-07
Payer: COMMERCIAL

## 2018-06-07 VITALS
SYSTOLIC BLOOD PRESSURE: 144 MMHG | DIASTOLIC BLOOD PRESSURE: 82 MMHG | WEIGHT: 135 LBS | BODY MASS INDEX: 21.19 KG/M2 | HEIGHT: 67 IN | HEART RATE: 92 BPM

## 2018-06-07 DIAGNOSIS — L03.031 PARONYCHIA OF GREAT TOE, RIGHT: ICD-10-CM

## 2018-06-07 DIAGNOSIS — L60.0 INGROWN NAIL: Primary | ICD-10-CM

## 2018-06-07 PROCEDURE — 3008F BODY MASS INDEX DOCD: CPT | Mod: S$GLB,ICN,, | Performed by: PODIATRIST

## 2018-06-07 PROCEDURE — 99202 OFFICE O/P NEW SF 15 MIN: CPT | Mod: S$GLB,ICN,, | Performed by: PODIATRIST

## 2018-06-07 PROCEDURE — 99999 PR PBB SHADOW E&M-EST. PATIENT-LVL III: CPT | Mod: PBBFAC,,, | Performed by: PODIATRIST

## 2018-06-10 PROBLEM — L60.0 INGROWN NAIL: Status: ACTIVE | Noted: 2018-06-10

## 2018-06-10 PROBLEM — L03.031 PARONYCHIA OF GREAT TOE, RIGHT: Status: ACTIVE | Noted: 2018-06-10

## 2018-06-10 NOTE — PROGRESS NOTES
Subjective:       Patient ID: Annalise Nava is a 27 y.o. female.    Chief Complaint: Ingrown Toenail (right foot big toe )    HPI patient presents with a complaint of a chronically ingrown toenail on her right great toe she states this tends to come and go some weeks it will be worse than others the patient has had a previous permanent matrixectomy to the medial border of the left hallux which looks very good and feels very good and she had no problems with now she is having problems with the right great toe.  Review of Systems   All other systems reviewed and are negative.      Objective:      Physical Exam   Constitutional: She appears well-developed and well-nourished.   Cardiovascular:   Pulses:       Dorsalis pedis pulses are 2+ on the right side, and 2+ on the left side.        Posterior tibial pulses are 2+ on the right side, and 2+ on the left side.   Musculoskeletal: Normal range of motion.   Feet:   Right Foot:   Skin Integrity: Positive for erythema and warmth.   Neurological: She is alert.   Skin: Skin is warm. Capillary refill takes less than 2 seconds.   Psychiatric: She has a normal mood and affect. Her behavior is normal. Judgment and thought content normal.     On evaluation patient has had a previous previous permanent matrixectomy medial border left hallux this looks very good there is no erythema no edema.  Patient now has mild erythema and edema at the medial border of the right hallux there is noted pain upon palpation of the area trimming the area did reveal a small amount of purulent drainage.  Assessment:       1. Ingrown nail    2. Paronychia of great toe, right        Plan:       Following evaluation the patient has a chronic history of ingrown toenails the left hallux had a permanent matrixectomy performed on the medial border patient states this is been great she has not had any issues with this and would like a similar procedure performed on the right.  Patient currently has mild  erythema and edema at the medial border of the right hallux trimming the nail did reveal a small amount of purulent drainage with obvious signs of infection.  Following evaluation I did recommend a permanent matrixectomy of the medial border right hallux have recommended putting the patient on an oral antibiotic will for 7-14 days leading up to the procedure to minimize the infection to maximize the success of the permanent matrixectomy since this is very mildly infected I have advised the patient we could likely due 7-10 days of antibiotics we would not need 2 weeks patient indicated she is currently trying to get pregnant sore going to hold off on prescribing her any antibiotics her doing the procedure until she is able to ensure that she is not actively pregnant in the next 2 weeks then we will be able to place the patient on the antibiotic and proceed with the procedure discussed today.  Patient was in agreement with treatment plan and will contact us so the we can start her on appropriate antibiotics pending the procedure. I did recommend this procedure since the patient has had chronic problems.    Disclaimer: The above-noted medical information and medical findings have been dictated utilizing a voice activated recognition dictation system while every attempt is made to correct any misspellings , punctuation problems or misinterpretation by the dictation system some errors may be present in the medical dictated note. Automated dictation devices while typically very accurate they do occasionally insert improper words or in accurate words and phrases. When these mistakes are noted they will be corrected and amended within the note.

## 2019-01-07 ENCOUNTER — LAB VISIT (OUTPATIENT)
Dept: LAB | Facility: HOSPITAL | Age: 29
End: 2019-01-07
Attending: OBSTETRICS & GYNECOLOGY
Payer: COMMERCIAL

## 2019-01-07 DIAGNOSIS — Z11.9 SCREENING EXAMINATION FOR INFECTIOUS DISEASE: ICD-10-CM

## 2019-01-07 DIAGNOSIS — Z11.8 SPECIAL SCREENING EXAMINATION FOR CHLAMYDIAL DISEASE: ICD-10-CM

## 2019-01-07 DIAGNOSIS — Z11.59 SCREENING FOR RUBELLA: ICD-10-CM

## 2019-01-07 DIAGNOSIS — Z11.3 SCREENING FOR VENEREAL DISEASE: ICD-10-CM

## 2019-01-07 DIAGNOSIS — Z11.8 SPECIAL SCREENING EXAMINATION FOR CHLAMYDIAL DISEASE: Primary | ICD-10-CM

## 2019-01-07 DIAGNOSIS — Z01.82 ENCOUNTER FOR ALLERGY TESTING: ICD-10-CM

## 2019-01-07 DIAGNOSIS — Z31.49 PROCREATION MANAGEMENT INVESTIGATION AND TESTING: ICD-10-CM

## 2019-01-07 LAB
ABO + RH BLD: NORMAL
BASOPHILS # BLD AUTO: 0 K/UL
BASOPHILS NFR BLD: 0.6 %
BLD GP AB SCN CELLS X3 SERPL QL: NORMAL
DIFFERENTIAL METHOD: ABNORMAL
EOSINOPHIL # BLD AUTO: 0.1 K/UL
EOSINOPHIL NFR BLD: 2.5 %
ERYTHROCYTE [DISTWIDTH] IN BLOOD BY AUTOMATED COUNT: 13.2 %
HCT VFR BLD AUTO: 36.2 %
HGB BLD-MCNC: 12.1 G/DL
LYMPHOCYTES # BLD AUTO: 1.1 K/UL
LYMPHOCYTES NFR BLD: 21.5 %
MCH RBC QN AUTO: 30.4 PG
MCHC RBC AUTO-ENTMCNC: 33.5 G/DL
MCV RBC AUTO: 91 FL
MONOCYTES # BLD AUTO: 0.6 K/UL
MONOCYTES NFR BLD: 11.8 %
NEUTROPHILS # BLD AUTO: 3.4 K/UL
NEUTROPHILS NFR BLD: 63.6 %
PLATELET # BLD AUTO: 232 K/UL
PMV BLD AUTO: 9.2 FL
RBC # BLD AUTO: 3.97 M/UL
TSH SERPL DL<=0.005 MIU/L-ACNC: 1.89 UIU/ML
WBC # BLD AUTO: 5.3 K/UL

## 2019-01-07 PROCEDURE — 86803 HEPATITIS C AB TEST: CPT

## 2019-01-07 PROCEDURE — 84146 ASSAY OF PROLACTIN: CPT

## 2019-01-07 PROCEDURE — 36415 COLL VENOUS BLD VENIPUNCTURE: CPT

## 2019-01-07 PROCEDURE — 86592 SYPHILIS TEST NON-TREP QUAL: CPT

## 2019-01-07 PROCEDURE — 85025 COMPLETE CBC W/AUTO DIFF WBC: CPT

## 2019-01-07 PROCEDURE — 84443 ASSAY THYROID STIM HORMONE: CPT

## 2019-01-07 PROCEDURE — 86762 RUBELLA ANTIBODY: CPT

## 2019-01-07 PROCEDURE — 82306 VITAMIN D 25 HYDROXY: CPT

## 2019-01-07 PROCEDURE — 86704 HEP B CORE ANTIBODY TOTAL: CPT

## 2019-01-07 PROCEDURE — 86703 HIV-1/HIV-2 1 RESULT ANTBDY: CPT

## 2019-01-07 PROCEDURE — 83520 IMMUNOASSAY QUANT NOS NONAB: CPT

## 2019-01-07 PROCEDURE — 87340 HEPATITIS B SURFACE AG IA: CPT

## 2019-01-07 PROCEDURE — 86376 MICROSOMAL ANTIBODY EACH: CPT

## 2019-01-07 PROCEDURE — 86901 BLOOD TYPING SEROLOGIC RH(D): CPT

## 2019-01-07 PROCEDURE — 86787 VARICELLA-ZOSTER ANTIBODY: CPT

## 2019-01-08 LAB
25(OH)D3+25(OH)D2 SERPL-MCNC: 29 NG/ML
HBV CORE AB SERPL QL IA: NEGATIVE
HBV SURFACE AG SERPL QL IA: NEGATIVE
HCV AB SERPL QL IA: NEGATIVE
HIV 1+2 AB+HIV1 P24 AG SERPL QL IA: NEGATIVE
PROLACTIN SERPL IA-MCNC: 12.1 NG/ML
RPR SER QL: NORMAL
RUBV IGG SER-ACNC: 16.8 IU/ML
RUBV IGG SER-IMP: REACTIVE
THYROPEROXIDASE IGG SERPL-ACNC: <6 IU/ML

## 2019-01-09 LAB
VARICELLA INTERPRETATION: POSITIVE
VARICELLA ZOSTER IGG: 3.12 ISR

## 2019-01-10 ENCOUNTER — LAB VISIT (OUTPATIENT)
Dept: LAB | Facility: HOSPITAL | Age: 29
End: 2019-01-10
Attending: FAMILY MEDICINE
Payer: COMMERCIAL

## 2019-01-10 PROCEDURE — 87491 CHLMYD TRACH DNA AMP PROBE: CPT

## 2019-01-11 LAB
C TRACH DNA SPEC QL NAA+PROBE: NOT DETECTED
MIS SERPL-MCNC: 4.7 NG/ML (ref 0.9–9.5)
N GONORRHOEA DNA SPEC QL NAA+PROBE: NOT DETECTED

## 2019-03-20 NOTE — PROGRESS NOTES
Called and spoke to patient regarding message for HH. Patient states that she does not need HH orders.

## 2019-06-18 ENCOUNTER — TELEPHONE (OUTPATIENT)
Dept: OPTOMETRY | Facility: CLINIC | Age: 29
End: 2019-06-18

## 2019-06-19 ENCOUNTER — OFFICE VISIT (OUTPATIENT)
Dept: OPTOMETRY | Facility: CLINIC | Age: 29
End: 2019-06-19
Payer: COMMERCIAL

## 2019-06-19 DIAGNOSIS — Z46.0 CONTACT LENS/GLASSES FITTING: Primary | ICD-10-CM

## 2019-06-19 DIAGNOSIS — H53.149 ACCOMMODATIVE ASTHENOPIA: Primary | ICD-10-CM

## 2019-06-19 DIAGNOSIS — H52.7 REFRACTIVE ERROR: ICD-10-CM

## 2019-06-19 PROCEDURE — 92310 PR CONTACT LENS FITTING (NO CHANGE): ICD-10-PCS | Mod: CSM,,, | Performed by: OPTOMETRIST

## 2019-06-19 PROCEDURE — 92015 PR REFRACTION: ICD-10-PCS | Mod: S$GLB,,, | Performed by: OPTOMETRIST

## 2019-06-19 PROCEDURE — 92015 DETERMINE REFRACTIVE STATE: CPT | Mod: S$GLB,,, | Performed by: OPTOMETRIST

## 2019-06-19 PROCEDURE — 92310 CONTACT LENS FITTING OU: CPT | Mod: CSM,,, | Performed by: OPTOMETRIST

## 2019-06-19 PROCEDURE — 99999 PR PBB SHADOW E&M-EST. PATIENT-LVL II: CPT | Mod: PBBFAC,,, | Performed by: OPTOMETRIST

## 2019-06-19 PROCEDURE — 99499 NO LOS: ICD-10-PCS | Mod: S$GLB,,, | Performed by: OPTOMETRIST

## 2019-06-19 PROCEDURE — 92004 COMPRE OPH EXAM NEW PT 1/>: CPT | Mod: S$GLB,,, | Performed by: OPTOMETRIST

## 2019-06-19 PROCEDURE — 92004 PR EYE EXAM, NEW PATIENT,COMPREHESV: ICD-10-PCS | Mod: S$GLB,,, | Performed by: OPTOMETRIST

## 2019-06-19 PROCEDURE — 99499 UNLISTED E&M SERVICE: CPT | Mod: S$GLB,,, | Performed by: OPTOMETRIST

## 2019-06-19 PROCEDURE — 99999 PR PBB SHADOW E&M-EST. PATIENT-LVL II: ICD-10-PCS | Mod: PBBFAC,,, | Performed by: OPTOMETRIST

## 2019-06-19 NOTE — PROGRESS NOTES
Assessment /Plan     For exam results, see Encounter Report.    Contact lens/glasses fitting      Patient is here for a comprehensive eye exam and contact lens fit. See other exam visit with same encounter date 06/19/19 for detailed exam information.

## 2019-06-19 NOTE — PROGRESS NOTES
HPI     HPI    Pt here today for yearly eye exam and contact lens fitting.      Contact Lens History:  Brand: B+L Ultra / Acvue 2 Pt states eyes get dry and irritated after she   wears contacts for one day. Her sclera feels swollen  And eye are teary   and watery and gets mucus in both eyes. Hx of GPC by previous doctor.   Avg Replacement: Every 2 weeks to every day   Age of current lenses: Last wore contacts 6 months ago     Would patient like a refraction today? Yes, pt states vision has been   stable with current glasses.      (-)drops  (-)flashes  (-)floaters  (-)diplopia     (-)Diabetes     OCULAR HISTORY  Last Eye Exam: 2 year ago  (-)eye surgery             Last edited by Cristhian Casas, OD on 6/19/2019  2:51 PM. (History)            Assessment /Plan     For exam results, see Encounter Report.    Accommodative asthenopia    Refractive error      Good ocular health of both eyes. Accommodative asthenopia, discussed findings and treatment options. Dispensed updated spectacle Rx. Discussed various spectacle lens options. Discussed adaptation period to new specs.  Demonstrated new spec Rx vs current specs in phoropter with patient satisfaction.    Discussed cls options, switched to daily disposable. Dispensed CLs trials: 1-Day Acuvue Moist. Discussed proper wear and care of lenses. DW only, dispose of daily. Do not sleep/swim/shower in lenses. Discontinue CL wear ASAP and RTC if any redness or discomfort occurs.     Mild CATIE OU. Recommend otc artificial tears twice daily. Return if becoming symptomatic.      RTC in 1 week for clfu, or sooner prn.

## 2019-06-26 ENCOUNTER — OFFICE VISIT (OUTPATIENT)
Dept: OPTOMETRY | Facility: CLINIC | Age: 29
End: 2019-06-26
Payer: COMMERCIAL

## 2019-06-26 DIAGNOSIS — Z46.0 CONTACT LENS/GLASSES FITTING: Primary | ICD-10-CM

## 2019-06-26 PROCEDURE — 99499 UNLISTED E&M SERVICE: CPT | Mod: S$GLB,,, | Performed by: OPTOMETRIST

## 2019-06-26 PROCEDURE — 99499 NO LOS: ICD-10-PCS | Mod: S$GLB,,, | Performed by: OPTOMETRIST

## 2019-06-26 NOTE — PROGRESS NOTES
HPI     Pt here today for 1 week follow up. Pt states vision and comfort has been   good with current.     Last edited by Cristhian Casas, OD on 6/26/2019  3:24 PM. (History)            Assessment /Plan     For exam results, see Encounter Report.    Contact lens/glasses fitting      Good clfu. Pt happy with comfort and vision. Dispensed CLs Rx: 1-Day Acuvue Moist. Discussed proper wear and care of lenses. DW only, dispose of daily. Do not sleep/swim/shower in lenses. Discontinue CL wear ASAP and RTC if any redness or discomfort occurs.       RTC in 1 year for comprehensive eye exam, or sooner prn.

## 2019-12-04 ENCOUNTER — OFFICE VISIT (OUTPATIENT)
Dept: PODIATRY | Facility: CLINIC | Age: 29
End: 2019-12-04
Payer: COMMERCIAL

## 2019-12-04 VITALS
HEIGHT: 67 IN | WEIGHT: 138 LBS | DIASTOLIC BLOOD PRESSURE: 73 MMHG | TEMPERATURE: 98 F | SYSTOLIC BLOOD PRESSURE: 120 MMHG | BODY MASS INDEX: 21.66 KG/M2 | HEART RATE: 73 BPM

## 2019-12-04 DIAGNOSIS — L03.031 PARONYCHIA OF GREAT TOE, RIGHT: ICD-10-CM

## 2019-12-04 DIAGNOSIS — L60.0 INGROWN NAIL: Primary | ICD-10-CM

## 2019-12-04 PROCEDURE — 99999 PR PBB SHADOW E&M-EST. PATIENT-LVL III: CPT | Mod: PBBFAC,,, | Performed by: PODIATRIST

## 2019-12-04 PROCEDURE — 3008F BODY MASS INDEX DOCD: CPT | Mod: S$GLB,,, | Performed by: PODIATRIST

## 2019-12-04 PROCEDURE — 99213 OFFICE O/P EST LOW 20 MIN: CPT | Mod: S$GLB,,, | Performed by: PODIATRIST

## 2019-12-04 PROCEDURE — 3008F PR BODY MASS INDEX (BMI) DOCUMENTED: ICD-10-PCS | Mod: S$GLB,,, | Performed by: PODIATRIST

## 2019-12-04 PROCEDURE — 99213 PR OFFICE/OUTPT VISIT, EST, LEVL III, 20-29 MIN: ICD-10-PCS | Mod: S$GLB,,, | Performed by: PODIATRIST

## 2019-12-04 PROCEDURE — 99999 PR PBB SHADOW E&M-EST. PATIENT-LVL III: ICD-10-PCS | Mod: PBBFAC,,, | Performed by: PODIATRIST

## 2019-12-04 NOTE — LETTER
December 7, 2019      Ene Rene MD  [address] [address]           [facility]  [address] [place] [address]  Phone: [phone]  Fax: [phone]          Patient: Annalise Nava   MR Number: [id number]   YOB: 1990   Date of Visit: 12/4/2019       Dear Dr. Ene Rene:    Thank you for referring Annalise Nava to me for evaluation. Attached you will find relevant portions of my assessment and plan of care.    If you have questions, please do not hesitate to call me. I look forward to following Annalise Nava along with you.    Sincerely,    Sea Vasquez, LORETA    Enclosure  CC:  No Recipients    If you would like to receive this communication electronically, please contact [email or web address] or [phone] to request more information on [employer] Link access.    For providers and/or their staff who would like to refer a patient to [facility], please contact us through our one-stop-shop provider referral line, [facility], at [phone].    If you feel you have received this communication in error or would no longer like to receive these types of communications, please e-mail [email or web address]

## 2019-12-08 NOTE — PROGRESS NOTES
Subjective:       Patient ID: Annalise Nava is a 29 y.o. female.    Chief Complaint: Follow-up; Ingrown Toenail; and Nail Problem    HPI patient presents with a complaint of a chronically ingrown toenail on her right great toe she states this tends to come and go some weeks it will be worse than others the patient has had a previous permanent matrixectomy to the medial border of the left hallux which looks very good and feels very good and she had no problems with now she is having problems with the right great toe.  Review of Systems   All other systems reviewed and are negative.      Objective:      Physical Exam   Constitutional: She appears well-developed and well-nourished.   Cardiovascular:   Pulses:       Dorsalis pedis pulses are 2+ on the right side, and 2+ on the left side.        Posterior tibial pulses are 2+ on the right side, and 2+ on the left side.   Pulmonary/Chest: Effort normal.   Musculoskeletal: Normal range of motion.   Feet:   Right Foot:   Protective Sensation: 4 sites tested. 4 sites sensed.   Skin Integrity: Positive for erythema and warmth.   Left Foot:   Protective Sensation: 4 sites tested. 4 sites sensed.   Neurological: She is alert.   Skin: Skin is warm. Capillary refill takes less than 2 seconds.   Psychiatric: She has a normal mood and affect. Her behavior is normal. Judgment and thought content normal.     On evaluation patient has had a previous previous permanent matrixectomy medial border left hallux this looks very good there is no erythema no edema.  Patient now has mild erythema and edema at the medial border of the right hallux there is noted pain upon palpation of the area.                  Assessment:       1. Ingrown nail    2. Paronychia of great toe, right        Plan:       Patient presents today for followup established patient she is having problems with an infected ingrown toenail on the right great toe she is also worried about discoloration of the left great  toenail.  On evaluation patient has positive pain with obvious signs of infection at the medial lateral border of the right hallux I showed the patient how these need to be kept properly trimmed at 1 time we had talked about doing a permanent matrixectomy however the patient did not follow up with me for that procedure at this time were going to just proceed conservatively trimming the area properly keeping the corners trimmed out rounding the corners I showed the patient how to do this additionally I have recommended application of Vicks vapor rub to the patient's left hallux nail there has obviously been trauma to the nail and has fungus in the nail but it is early stage now is definitely the time to treated patient understood this I explained the application of Vicks twice a day every day for the next 4-6 months patient will contact us as needed for follow-up I have advised her if she continues to have problems with the ingrown toenail she really needs to consider the permanent matrixectomy otherwise I plan to follow up with patient as needed.  I did advise the patient certainly any signs of infection she needs to start soaking her feet immediately in Epson salt.  Total face-to-face time including discussion evaluation and treatment equaled 15 min.    Disclaimer: The above-noted medical information and medical findings have been dictated utilizing a voice activated recognition dictation system while every attempt is made to correct any misspellings , punctuation problems or misinterpretation by the dictation system some errors may be present in the medical dictated note. Automated dictation devices while typically very accurate they do occasionally insert improper words or in accurate words and phrases. When these mistakes are noted they will be corrected and amended within the note.

## 2019-12-10 ENCOUNTER — OFFICE VISIT (OUTPATIENT)
Dept: FAMILY MEDICINE | Facility: CLINIC | Age: 29
End: 2019-12-10
Payer: COMMERCIAL

## 2019-12-10 VITALS
BODY MASS INDEX: 22.29 KG/M2 | OXYGEN SATURATION: 98 % | TEMPERATURE: 99 F | SYSTOLIC BLOOD PRESSURE: 102 MMHG | DIASTOLIC BLOOD PRESSURE: 68 MMHG | HEART RATE: 67 BPM | WEIGHT: 142 LBS | HEIGHT: 67 IN

## 2019-12-10 DIAGNOSIS — K58.9 IRRITABLE BOWEL SYNDROME, UNSPECIFIED TYPE: ICD-10-CM

## 2019-12-10 DIAGNOSIS — Z00.00 ANNUAL PHYSICAL EXAM: Primary | ICD-10-CM

## 2019-12-10 DIAGNOSIS — G25.0 ESSENTIAL TREMOR: ICD-10-CM

## 2019-12-10 DIAGNOSIS — Z82.49 FAMILY HISTORY OF EARLY CAD: ICD-10-CM

## 2019-12-10 PROCEDURE — 99999 PR PBB SHADOW E&M-EST. PATIENT-LVL III: ICD-10-PCS | Mod: PBBFAC,,, | Performed by: NURSE PRACTITIONER

## 2019-12-10 PROCEDURE — 99395 PR PREVENTIVE VISIT,EST,18-39: ICD-10-PCS | Mod: S$GLB,,, | Performed by: NURSE PRACTITIONER

## 2019-12-10 PROCEDURE — 99395 PREV VISIT EST AGE 18-39: CPT | Mod: S$GLB,,, | Performed by: NURSE PRACTITIONER

## 2019-12-10 PROCEDURE — 99999 PR PBB SHADOW E&M-EST. PATIENT-LVL III: CPT | Mod: PBBFAC,,, | Performed by: NURSE PRACTITIONER

## 2019-12-10 NOTE — PROGRESS NOTES
Subjective:       Patient ID: Annalise Nava is a 29 y.o. female.    Chief Complaint: Annual Exam    Ms. Nava is a 29-year-old female patient who presents today for an annual physical exam.  She is feeling well today without complaint.  Has a strong family history of coronary artery disease. She is no longer taking propanolol for essential tremor, currently controlled without medications.  She is also not requiring Bentyl.  Recently completed a whole 30 diet and has lost approximately 7 lb.  Initially started at 148 lb.  This helped her IBS symptoms immensely.  Vital stable.    Patient Active Problem List   Diagnosis    Irritable bowel syndrome    Essential tremor    Family history of early CAD    Ingrown nail    Paronychia of great toe, right       Current Outpatient Medications:     dicyclomine (BENTYL) 10 MG capsule, Take 2 capsules (20 mg total) by mouth 4 (four) times daily before meals and nightly., Disp: 40 capsule, Rfl: prn    multivitamin with minerals tablet, Take 1 tablet by mouth once daily., Disp: , Rfl:     Lab Results   Component Value Date    WBC 5.30 01/07/2019    HGB 12.1 01/07/2019    HCT 36.2 (L) 01/07/2019     01/07/2019    CHOL 198 10/27/2017    TRIG 55 10/27/2017    HDL 69 10/27/2017    ALT 16 12/05/2017    AST 21 12/05/2017     10/27/2017    K 3.8 10/27/2017     10/27/2017    CREATININE 0.8 10/27/2017    BUN 16 10/27/2017    CO2 25 10/27/2017    TSH 1.889 01/07/2019     Review of Systems   Constitutional: Negative for activity change and unexpected weight change.   HENT: Negative for hearing loss, rhinorrhea and trouble swallowing.    Eyes: Negative for discharge and visual disturbance.   Respiratory: Negative for chest tightness and wheezing.    Cardiovascular: Negative for chest pain and palpitations.   Gastrointestinal: Negative for blood in stool, constipation, diarrhea and vomiting.   Endocrine: Negative for polydipsia and polyuria.   Genitourinary: Negative  for difficulty urinating, dysuria, hematuria and menstrual problem.   Musculoskeletal: Negative for arthralgias, joint swelling and neck pain.   Neurological: Negative for weakness and headaches.   Psychiatric/Behavioral: Negative for confusion and dysphoric mood.       Objective:      Physical Exam   Constitutional: She is oriented to person, place, and time. Vital signs are normal. She appears well-developed and well-nourished. No distress.   HENT:   Right Ear: Tympanic membrane, external ear and ear canal normal.   Left Ear: Tympanic membrane, external ear and ear canal normal.   Nose: No mucosal edema or rhinorrhea.   Mouth/Throat: Oropharynx is clear and moist.   Neck: Normal range of motion. Neck supple.   Cardiovascular: Normal rate, regular rhythm and normal heart sounds.   Pulmonary/Chest: Effort normal and breath sounds normal. She has no wheezes.   Abdominal: Normal appearance.   Musculoskeletal: She exhibits no edema.   Lymphadenopathy:     She has no cervical adenopathy.   Neurological: She is alert and oriented to person, place, and time.   Skin: Skin is warm and dry.   Psychiatric: She has a normal mood and affect.   Nursing note and vitals reviewed.      Assessment:       1. Annual physical exam    2. Essential tremor    3. Family history of early CAD    4. Irritable bowel syndrome, unspecified type    5. Body mass index (BMI) 22.0-22.9, adult        Plan:       Annalise was seen today for annual exam.    Diagnoses and all orders for this visit:    Annual physical exam  -     Comprehensive metabolic panel; Future  -     CBC auto differential; Future  -     Lipid panel; Future  Discussed health maintenance guidelines appropriate for age.    Screen and treat as needed    Essential tremor  Controlled off medications    Family history of early CAD  -     Homocysteine, serum; Future  -     High sensitivity CRP (Cardiac CRP); Future  Screen and treat as needed    Irritable bowel syndrome, unspecified  type  Controlled off medications, continue diet modifications    Body mass index (BMI) 22.0-22.9, adult  Continue heart healthy diet and encouraged regular aerobic exercise    F/U annually     Patient will check on documentation for tetanus vaccine    This office note has been dictated.

## 2019-12-13 ENCOUNTER — TELEPHONE (OUTPATIENT)
Dept: FAMILY MEDICINE | Facility: CLINIC | Age: 29
End: 2019-12-13

## 2019-12-13 ENCOUNTER — LAB VISIT (OUTPATIENT)
Dept: LAB | Facility: HOSPITAL | Age: 29
End: 2019-12-13
Attending: NURSE PRACTITIONER
Payer: COMMERCIAL

## 2019-12-13 DIAGNOSIS — D64.9 ANEMIA, UNSPECIFIED TYPE: Primary | ICD-10-CM

## 2019-12-13 DIAGNOSIS — Z82.49 FAMILY HISTORY OF EARLY CAD: ICD-10-CM

## 2019-12-13 DIAGNOSIS — Z00.00 ANNUAL PHYSICAL EXAM: ICD-10-CM

## 2019-12-13 LAB
BASOPHILS # BLD AUTO: 0.05 K/UL (ref 0–0.2)
BASOPHILS NFR BLD: 0.6 % (ref 0–1.9)
CRP SERPL-MCNC: 0.9 MG/L (ref 0–3.19)
DIFFERENTIAL METHOD: ABNORMAL
EOSINOPHIL # BLD AUTO: 0.3 K/UL (ref 0–0.5)
EOSINOPHIL NFR BLD: 3.8 % (ref 0–8)
ERYTHROCYTE [DISTWIDTH] IN BLOOD BY AUTOMATED COUNT: 12.2 % (ref 11.5–14.5)
HCT VFR BLD AUTO: 34.3 % (ref 37–48.5)
HGB BLD-MCNC: 11.2 G/DL (ref 12–16)
IMM GRANULOCYTES # BLD AUTO: 0.03 K/UL (ref 0–0.04)
LYMPHOCYTES # BLD AUTO: 1.7 K/UL (ref 1–4.8)
LYMPHOCYTES NFR BLD: 19.5 % (ref 18–48)
MCH RBC QN AUTO: 30.4 PG (ref 27–31)
MCHC RBC AUTO-ENTMCNC: 32.7 G/DL (ref 32–36)
MCV RBC AUTO: 93 FL (ref 82–98)
MONOCYTES # BLD AUTO: 0.8 K/UL (ref 0.3–1)
MONOCYTES NFR BLD: 8.4 % (ref 4–15)
NEUTROPHILS # BLD AUTO: 6 K/UL (ref 1.8–7.7)
NEUTROPHILS NFR BLD: 67.4 % (ref 38–73)
NRBC BLD-RTO: 0 /100 WBC
PLATELET # BLD AUTO: 228 K/UL (ref 150–350)
PMV BLD AUTO: 11.2 FL (ref 9.2–12.9)
RBC # BLD AUTO: 3.68 M/UL (ref 4–5.4)
WBC # BLD AUTO: 8.92 K/UL (ref 3.9–12.7)

## 2019-12-13 PROCEDURE — 85025 COMPLETE CBC W/AUTO DIFF WBC: CPT

## 2019-12-13 PROCEDURE — 86141 C-REACTIVE PROTEIN HS: CPT

## 2020-04-21 DIAGNOSIS — Z01.84 ANTIBODY RESPONSE EXAMINATION: ICD-10-CM

## 2020-04-23 ENCOUNTER — LAB VISIT (OUTPATIENT)
Dept: LAB | Facility: HOSPITAL | Age: 30
End: 2020-04-23
Attending: INTERNAL MEDICINE
Payer: COMMERCIAL

## 2020-04-23 DIAGNOSIS — Z01.84 ANTIBODY RESPONSE EXAMINATION: ICD-10-CM

## 2020-04-23 LAB — SARS-COV-2 IGG SERPL QL IA: NEGATIVE

## 2020-04-23 PROCEDURE — 36415 COLL VENOUS BLD VENIPUNCTURE: CPT

## 2020-04-23 PROCEDURE — 86769 SARS-COV-2 COVID-19 ANTIBODY: CPT

## 2020-06-24 LAB — PAP SMEAR: NORMAL

## 2020-08-11 ENCOUNTER — TELEPHONE (OUTPATIENT)
Dept: PRIMARY CARE CLINIC | Facility: OTHER | Age: 30
End: 2020-08-11

## 2020-08-11 ENCOUNTER — LAB VISIT (OUTPATIENT)
Dept: URGENT CARE | Facility: CLINIC | Age: 30
End: 2020-08-11
Payer: COMMERCIAL

## 2020-08-11 DIAGNOSIS — R20.8 NASAL BURNING: ICD-10-CM

## 2020-08-11 DIAGNOSIS — J02.9 SORE THROAT: Primary | ICD-10-CM

## 2020-08-11 DIAGNOSIS — J02.9 SORE THROAT: ICD-10-CM

## 2020-08-11 LAB
CTP QC/QA: YES
SARS-COV-2 RDRP RESP QL NAA+PROBE: NEGATIVE

## 2020-08-11 PROCEDURE — U0002 COVID-19 LAB TEST NON-CDC: HCPCS | Mod: S$GLB,,, | Performed by: INTERNAL MEDICINE

## 2020-08-11 PROCEDURE — U0002: ICD-10-PCS | Mod: S$GLB,,, | Performed by: INTERNAL MEDICINE

## 2020-12-06 ENCOUNTER — LAB VISIT (OUTPATIENT)
Dept: EMERGENCY MEDICINE | Facility: HOSPITAL | Age: 30
End: 2020-12-06
Attending: FAMILY MEDICINE
Payer: COMMERCIAL

## 2020-12-06 DIAGNOSIS — R53.83 FATIGUE, UNSPECIFIED TYPE: ICD-10-CM

## 2020-12-06 DIAGNOSIS — R50.9 FEVER, UNSPECIFIED FEVER CAUSE: ICD-10-CM

## 2020-12-06 DIAGNOSIS — R50.9 FEVER, UNSPECIFIED FEVER CAUSE: Primary | ICD-10-CM

## 2020-12-06 DIAGNOSIS — R19.7 DIARRHEA, UNSPECIFIED TYPE: ICD-10-CM

## 2020-12-06 LAB — SARS-COV-2 RDRP RESP QL NAA+PROBE: NEGATIVE

## 2020-12-06 PROCEDURE — U0002 COVID-19 LAB TEST NON-CDC: HCPCS

## 2020-12-07 ENCOUNTER — TELEPHONE (OUTPATIENT)
Dept: PRIMARY CARE CLINIC | Facility: CLINIC | Age: 30
End: 2020-12-07

## 2020-12-07 NOTE — TELEPHONE ENCOUNTER
Called regarding negative COVID test result.  Left VM with return to work date 12/8 (if not already at work today) and number for employee health to call with any questions.

## 2020-12-21 ENCOUNTER — LAB VISIT (OUTPATIENT)
Dept: LAB | Facility: HOSPITAL | Age: 30
End: 2020-12-21
Attending: PSYCHIATRY & NEUROLOGY
Payer: COMMERCIAL

## 2020-12-21 DIAGNOSIS — E66.8 OBESITY OF ENDOCRINE ORIGIN: Primary | ICD-10-CM

## 2020-12-21 PROCEDURE — 84144 ASSAY OF PROGESTERONE: CPT

## 2020-12-21 PROCEDURE — 36415 COLL VENOUS BLD VENIPUNCTURE: CPT

## 2020-12-22 LAB — PROGEST SERPL-MCNC: 19.2 NG/ML

## 2020-12-29 ENCOUNTER — IMMUNIZATION (OUTPATIENT)
Dept: PRIMARY CARE CLINIC | Facility: CLINIC | Age: 30
End: 2020-12-29
Payer: COMMERCIAL

## 2020-12-29 DIAGNOSIS — Z23 NEED FOR VACCINATION: ICD-10-CM

## 2020-12-29 PROCEDURE — 0001A COVID-19, MRNA, LNP-S, PF, 30 MCG/0.3 ML DOSE VACCINE: ICD-10-PCS | Mod: CV19,S$GLB,, | Performed by: FAMILY MEDICINE

## 2020-12-29 PROCEDURE — 91300 COVID-19, MRNA, LNP-S, PF, 30 MCG/0.3 ML DOSE VACCINE: CPT | Mod: S$GLB,,, | Performed by: FAMILY MEDICINE

## 2020-12-29 PROCEDURE — 0001A COVID-19, MRNA, LNP-S, PF, 30 MCG/0.3 ML DOSE VACCINE: CPT | Mod: CV19,S$GLB,, | Performed by: FAMILY MEDICINE

## 2020-12-29 PROCEDURE — 91300 COVID-19, MRNA, LNP-S, PF, 30 MCG/0.3 ML DOSE VACCINE: ICD-10-PCS | Mod: S$GLB,,, | Performed by: FAMILY MEDICINE

## 2020-12-31 ENCOUNTER — SPECIALTY PHARMACY (OUTPATIENT)
Dept: PHARMACY | Facility: CLINIC | Age: 30
End: 2020-12-31

## 2020-12-31 ENCOUNTER — LAB VISIT (OUTPATIENT)
Dept: LAB | Facility: HOSPITAL | Age: 30
End: 2020-12-31
Attending: OBSTETRICS & GYNECOLOGY
Payer: COMMERCIAL

## 2020-12-31 DIAGNOSIS — Z11.59 SPECIAL SCREENING EXAMINATION FOR UNSPECIFIED VIRAL DISEASE: ICD-10-CM

## 2020-12-31 DIAGNOSIS — Z31.49 OTHER INVESTIGATION AND TESTING FOR PROCREATIVE MANAGEMENT: ICD-10-CM

## 2020-12-31 DIAGNOSIS — Z11.9 SCREENING EXAMINATION FOR UNSPECIFIED INFECTIOUS DISEASE: ICD-10-CM

## 2020-12-31 LAB
ABO + RH BLD: NORMAL
BASOPHILS # BLD AUTO: 0.05 K/UL (ref 0–0.2)
BASOPHILS NFR BLD: 1.1 % (ref 0–1.9)
DIFFERENTIAL METHOD: ABNORMAL
EOSINOPHIL # BLD AUTO: 0.2 K/UL (ref 0–0.5)
EOSINOPHIL NFR BLD: 4.4 % (ref 0–8)
ERYTHROCYTE [DISTWIDTH] IN BLOOD BY AUTOMATED COUNT: 12.3 % (ref 11.5–14.5)
HCT VFR BLD AUTO: 35.1 % (ref 37–48.5)
HGB BLD-MCNC: 11.4 G/DL (ref 12–16)
IMM GRANULOCYTES # BLD AUTO: 0.02 K/UL (ref 0–0.04)
IMM GRANULOCYTES NFR BLD AUTO: 0.4 % (ref 0–0.5)
LYMPHOCYTES # BLD AUTO: 1.1 K/UL (ref 1–4.8)
LYMPHOCYTES NFR BLD: 24 % (ref 18–48)
MCH RBC QN AUTO: 30.9 PG (ref 27–31)
MCHC RBC AUTO-ENTMCNC: 32.5 G/DL (ref 32–36)
MCV RBC AUTO: 95 FL (ref 82–98)
MONOCYTES # BLD AUTO: 1 K/UL (ref 0.3–1)
MONOCYTES NFR BLD: 20.4 % (ref 4–15)
NEUTROPHILS # BLD AUTO: 2.4 K/UL (ref 1.8–7.7)
NEUTROPHILS NFR BLD: 49.7 % (ref 38–73)
NRBC BLD-RTO: 0 /100 WBC
PLATELET # BLD AUTO: 201 K/UL (ref 150–350)
PMV BLD AUTO: 10.4 FL (ref 9.2–12.9)
RBC # BLD AUTO: 3.69 M/UL (ref 4–5.4)
RPR SER QL: NORMAL
T4 FREE SERPL-MCNC: 0.95 NG/DL (ref 0.71–1.51)
THYROPEROXIDASE IGG SERPL-ACNC: <6 IU/ML
TSH SERPL DL<=0.005 MIU/L-ACNC: 2.22 UIU/ML (ref 0.4–4)
WBC # BLD AUTO: 4.75 K/UL (ref 3.9–12.7)

## 2020-12-31 PROCEDURE — 84443 ASSAY THYROID STIM HORMONE: CPT

## 2020-12-31 PROCEDURE — 36415 COLL VENOUS BLD VENIPUNCTURE: CPT

## 2020-12-31 PROCEDURE — 85025 COMPLETE CBC W/AUTO DIFF WBC: CPT

## 2020-12-31 PROCEDURE — 86376 MICROSOMAL ANTIBODY EACH: CPT

## 2020-12-31 PROCEDURE — 87340 HEPATITIS B SURFACE AG IA: CPT

## 2020-12-31 PROCEDURE — 86803 HEPATITIS C AB TEST: CPT

## 2020-12-31 PROCEDURE — 84439 ASSAY OF FREE THYROXINE: CPT

## 2020-12-31 PROCEDURE — 86900 BLOOD TYPING SEROLOGIC ABO: CPT

## 2020-12-31 PROCEDURE — 83520 IMMUNOASSAY QUANT NOS NONAB: CPT

## 2020-12-31 PROCEDURE — 86592 SYPHILIS TEST NON-TREP QUAL: CPT

## 2020-12-31 PROCEDURE — 86705 HEP B CORE ANTIBODY IGM: CPT

## 2020-12-31 PROCEDURE — 86703 HIV-1/HIV-2 1 RESULT ANTBDY: CPT

## 2020-12-31 NOTE — TELEPHONE ENCOUNTER
Specialty Pharmacy - Initial Clinical Assessment  Specialty Pharmacy - Medication/Referral Authorization    Specialty Medication Orders Linked to Encounter      Most Recent Value   Medication #1  chorionic gonadotropin, human (NOVAREL) 5,000 unit SolR (Order#[id number], Rx#[id number])        Khai Nava is a 30 y.o. female, who is followed by the specialty pharmacy service for management and education.    Recent Encounters     Date Type Provider Description    12/31/2020 Specialty Pharmacy Brisa Becerra PharmD Initial Clinical Assessment; Referral Authorization        Clinical call attempts since last clinical assessment   No call attempts found.     Today she received education before her first fill with [facility].    Current Outpatient Medications   Medication Sig    chorionic gonadotropin, human (NOVAREL) 5,000 unit SolR Mix with 1ml of sterile water and inject under the skin as directed    multivitamin with minerals tablet Take 1 tablet by mouth once daily.   Last reviewed on 12/31/2020 11:29 AM by Corinne Mcdonough PharmD    Review of patient's allergies indicates:  No Known AllergiesLast reviewed on  12/31/2020 11:29 AM by Corinne Mcdonough    Drug Interactions    Drug interactions evaluated: yes  Clinically relevant drug interactions identified: no  Provided the patient with educational material regarding drug interactions: not applicable         Adverse Effects    *All other systems reviewed and are negative       Assessment Questions - Documented Responses      Most Recent Value   Assessment   Medication Reconciliation completed for patient  Yes   Goals of Therapy Status  Discussed (new start)   Welcome packet contents reviewed and discussed with patient?  Yes   Assesment completed?  Yes   Plan  Therapy being initiated   Do you need to open a clinical intervention (i-vent)?  No   Do you want to schedule first shipment?  Yes   Medication #1 Assessment Info   Patient status   "New medication, New to [facility]   Is this medication appropriate for the patient?  Yes   Is this medication effective?  Not yet started        Refill Questions - Documented Responses      Most Recent Value   Relationship to patient of person spoken to?  Self   HIPAA/medical authority confirmed?  Yes   Can the patient store medication/sharps container properly (at the correct temperature, away from children/pets, etc.)?  Yes   Can the patient call emergency services (911) in the event of an emergency?  Yes   Does the patient have any concerns or questions about taking or administering this medication as prescribed?  No   How many doses does the patient have on hand?  0   How many days does the patient report on hand quantity will last?  0   How will the patient receive the medication?  Mail   When does the patient need to receive the medication?  01/05/21   Shipping Address  Home   Address in [facility] confirmed and updated if neccessary?  Yes   Expected Copay ($)  74.92   Is the patient able to afford the medication copay?  Yes   Payment Method  new CC added to file   Days supply of Refill  30   Refill activity completed?  Yes   Refill activity plan  Refill scheduled   Shipment/Pickup Date:  01/04/21          Objective    She has no past medical history on file.    Tried/failed medications: none    BP Readings from Last 4 Encounters:   12/10/19 102/68   12/04/19 120/73   06/07/18 (!) 144/82   05/14/18 123/77     Ht Readings from Last 4 Encounters:   12/10/19 5' 7" (1.702 m)   12/04/19 5' 7" (1.702 m)   06/07/18 5' 7" (1.702 m)   05/14/18 5' 7" (1.702 m)     Wt Readings from Last 4 Encounters:   12/10/19 64.4 kg (141 lb 15.6 oz)   12/04/19 62.6 kg (138 lb)   06/07/18 61.2 kg (135 lb)   05/14/18 63.1 kg (139 lb 1.8 oz)       The goals of prescribed drug therapy management include:  · Supporting patient to meet the prescriber's medical treatment objectives  · Improving or maintaining quality of life  · Maintaining " optimal therapy adherence  · Minimizing and managing side effects      Goals of Therapy Status: Discussed (new start)    Assessment/Plan  Patient plans to start medication with provider direction the week of 1/4/21 (exact date TBD).       Indication, dosage, appropriateness, effectiveness, safety and convenience of her specialty medication(s) were reviewed today.     Patient Counseling    Counseled the patient on the following: doses and administration discussed, safe handling, storage, and disposal discussed, possible adverse effects and management discussed, possible drug and prescription drug interactions discussed, lab monitoring and follow-up discussed, therapeutic rationale discussed, cost of medications and cost implications discussed, pharmacy contact information discussed       Patient aware she will be self-administering and will be shipped all administration supplies (syringe, needle, alcohol swabs, bacteriostatic water) to her home address. We discussed whether she would like a sharps container, but she will use a milk jug with duct tape.     No further questions/concerns. [employer] added to cover cost of $74.98. Patient aware she will call [facility] for all future refills.     Tasks added this encounter   12/31/2020 - Referral Authorization   Tasks due within next 3 months   No tasks due.     Corinne Mcdonough, PharmD  [facility]  [address] [place] [id number]  Phone: [phone]  Fax: [phone]

## 2020-12-31 NOTE — TELEPHONE ENCOUNTER
Patient returned call and received brief initial consultation on Novarel including side effects and precautions.     Patient unsure if she is administering to herself, or if she is getting administered in clinic. She requested I call [facility] to confirm before setting up shipment.     Called [facility] and spoke with Lorenzo who confirmed that patient will receive shipment to her home and self-administer with provider instruction. Lorenzo also confirmed subcutaneous administration and patient need for supplies.     Called patient back at 11:07am to complete shipment but was unable to reach patient. LVM requesting call back.

## 2021-01-04 ENCOUNTER — PATIENT MESSAGE (OUTPATIENT)
Dept: ADMINISTRATIVE | Facility: HOSPITAL | Age: 31
End: 2021-01-04

## 2021-01-04 LAB
HBV CORE IGM SERPL QL IA: NEGATIVE
HBV SURFACE AG SERPL QL IA: NEGATIVE
HCV AB SERPL QL IA: NEGATIVE
HIV 1+2 AB+HIV1 P24 AG SERPL QL IA: NEGATIVE
MIS SERPL-MCNC: 2.4 NG/ML (ref 0.58–8.1)

## 2021-01-19 ENCOUNTER — IMMUNIZATION (OUTPATIENT)
Dept: PRIMARY CARE CLINIC | Facility: CLINIC | Age: 31
End: 2021-01-19
Payer: COMMERCIAL

## 2021-01-19 DIAGNOSIS — Z23 NEED FOR VACCINATION: Primary | ICD-10-CM

## 2021-01-19 PROCEDURE — 91300 COVID-19, MRNA, LNP-S, PF, 30 MCG/0.3 ML DOSE VACCINE: ICD-10-PCS | Mod: S$GLB,,, | Performed by: FAMILY MEDICINE

## 2021-01-19 PROCEDURE — 91300 COVID-19, MRNA, LNP-S, PF, 30 MCG/0.3 ML DOSE VACCINE: CPT | Mod: S$GLB,,, | Performed by: FAMILY MEDICINE

## 2021-01-19 PROCEDURE — 0002A COVID-19, MRNA, LNP-S, PF, 30 MCG/0.3 ML DOSE VACCINE: ICD-10-PCS | Mod: S$GLB,,, | Performed by: FAMILY MEDICINE

## 2021-01-19 PROCEDURE — 0002A COVID-19, MRNA, LNP-S, PF, 30 MCG/0.3 ML DOSE VACCINE: CPT | Mod: S$GLB,,, | Performed by: FAMILY MEDICINE

## 2021-01-25 ENCOUNTER — LAB VISIT (OUTPATIENT)
Dept: LAB | Facility: HOSPITAL | Age: 31
End: 2021-01-25
Attending: OBSTETRICS & GYNECOLOGY
Payer: COMMERCIAL

## 2021-01-25 DIAGNOSIS — Z32.00 PREGNANCY EXAMINATION OR TEST, PREGNANCY UNCONFIRMED: ICD-10-CM

## 2021-01-25 LAB
HCG INTACT+B SERPL-ACNC: <1.2 MIU/ML
PROGEST SERPL-MCNC: 4.4 NG/ML

## 2021-01-25 PROCEDURE — 36415 COLL VENOUS BLD VENIPUNCTURE: CPT

## 2021-01-25 PROCEDURE — 84144 ASSAY OF PROGESTERONE: CPT

## 2021-01-25 PROCEDURE — 84702 CHORIONIC GONADOTROPIN TEST: CPT

## 2021-01-29 ENCOUNTER — OFFICE VISIT (OUTPATIENT)
Dept: OTOLARYNGOLOGY | Facility: CLINIC | Age: 31
End: 2021-01-29
Payer: COMMERCIAL

## 2021-01-29 VITALS
BODY MASS INDEX: 24.08 KG/M2 | HEART RATE: 85 BPM | OXYGEN SATURATION: 98 % | HEIGHT: 67 IN | SYSTOLIC BLOOD PRESSURE: 132 MMHG | DIASTOLIC BLOOD PRESSURE: 89 MMHG | WEIGHT: 153.44 LBS

## 2021-01-29 DIAGNOSIS — J35.8 TONSILLITH: Primary | ICD-10-CM

## 2021-01-29 PROCEDURE — 3008F PR BODY MASS INDEX (BMI) DOCUMENTED: ICD-10-PCS | Mod: CPTII,S$GLB,, | Performed by: OTOLARYNGOLOGY

## 2021-01-29 PROCEDURE — 1126F PR PAIN SEVERITY QUANTIFIED, NO PAIN PRESENT: ICD-10-PCS | Mod: S$GLB,,, | Performed by: OTOLARYNGOLOGY

## 2021-01-29 PROCEDURE — 3008F BODY MASS INDEX DOCD: CPT | Mod: CPTII,S$GLB,, | Performed by: OTOLARYNGOLOGY

## 2021-01-29 PROCEDURE — 99203 PR OFFICE/OUTPT VISIT, NEW, LEVL III, 30-44 MIN: ICD-10-PCS | Mod: S$GLB,,, | Performed by: OTOLARYNGOLOGY

## 2021-01-29 PROCEDURE — 1126F AMNT PAIN NOTED NONE PRSNT: CPT | Mod: S$GLB,,, | Performed by: OTOLARYNGOLOGY

## 2021-01-29 PROCEDURE — 99203 OFFICE O/P NEW LOW 30 MIN: CPT | Mod: S$GLB,,, | Performed by: OTOLARYNGOLOGY

## 2021-01-29 RX ORDER — LETROZOLE 2.5 MG/1
TABLET, FILM COATED ORAL
COMMUNITY
Start: 2021-01-03 | End: 2021-11-22

## 2021-02-01 ENCOUNTER — SPECIALTY PHARMACY (OUTPATIENT)
Dept: PHARMACY | Facility: CLINIC | Age: 31
End: 2021-02-01

## 2021-02-24 ENCOUNTER — LAB VISIT (OUTPATIENT)
Dept: LAB | Facility: HOSPITAL | Age: 31
End: 2021-02-24
Attending: OBSTETRICS & GYNECOLOGY
Payer: COMMERCIAL

## 2021-02-24 DIAGNOSIS — Z32.00 PREGNANCY EXAMINATION OR TEST, PREGNANCY UNCONFIRMED: ICD-10-CM

## 2021-02-24 LAB — HCG INTACT+B SERPL-ACNC: <1.2 MIU/ML

## 2021-02-24 PROCEDURE — 36415 COLL VENOUS BLD VENIPUNCTURE: CPT

## 2021-02-24 PROCEDURE — 84702 CHORIONIC GONADOTROPIN TEST: CPT

## 2021-02-24 PROCEDURE — 84144 ASSAY OF PROGESTERONE: CPT

## 2021-02-25 LAB — PROGEST SERPL-MCNC: 6 NG/ML

## 2021-03-08 ENCOUNTER — SPECIALTY PHARMACY (OUTPATIENT)
Dept: PHARMACY | Facility: CLINIC | Age: 31
End: 2021-03-08

## 2021-03-25 ENCOUNTER — LAB VISIT (OUTPATIENT)
Dept: LAB | Facility: HOSPITAL | Age: 31
End: 2021-03-25
Attending: OBSTETRICS & GYNECOLOGY
Payer: COMMERCIAL

## 2021-03-25 DIAGNOSIS — Z32.00 PREGNANCY EXAMINATION OR TEST, PREGNANCY UNCONFIRMED: ICD-10-CM

## 2021-03-25 LAB
HCG INTACT+B SERPL-ACNC: <1.2 MIU/ML
PROGEST SERPL-MCNC: 5 NG/ML

## 2021-03-25 PROCEDURE — 36415 COLL VENOUS BLD VENIPUNCTURE: CPT | Performed by: OBSTETRICS & GYNECOLOGY

## 2021-03-25 PROCEDURE — 84702 CHORIONIC GONADOTROPIN TEST: CPT | Performed by: OBSTETRICS & GYNECOLOGY

## 2021-03-25 PROCEDURE — 84144 ASSAY OF PROGESTERONE: CPT | Performed by: OBSTETRICS & GYNECOLOGY

## 2021-03-26 ENCOUNTER — LAB VISIT (OUTPATIENT)
Dept: LAB | Facility: HOSPITAL | Age: 31
End: 2021-03-26
Attending: OBSTETRICS & GYNECOLOGY
Payer: COMMERCIAL

## 2021-03-26 DIAGNOSIS — Z31.49 PROCREATION MANAGEMENT INVESTIGATION AND TESTING: ICD-10-CM

## 2021-03-26 PROCEDURE — 87591 N.GONORRHOEAE DNA AMP PROB: CPT | Performed by: OBSTETRICS & GYNECOLOGY

## 2021-03-26 PROCEDURE — 87491 CHLMYD TRACH DNA AMP PROBE: CPT | Performed by: OBSTETRICS & GYNECOLOGY

## 2021-03-29 LAB
C TRACH DNA SPEC QL NAA+PROBE: NOT DETECTED
N GONORRHOEA DNA SPEC QL NAA+PROBE: NOT DETECTED

## 2021-04-05 ENCOUNTER — PATIENT MESSAGE (OUTPATIENT)
Dept: ADMINISTRATIVE | Facility: HOSPITAL | Age: 31
End: 2021-04-05

## 2021-06-14 ENCOUNTER — SPECIALTY PHARMACY (OUTPATIENT)
Dept: PHARMACY | Facility: CLINIC | Age: 31
End: 2021-06-14

## 2021-06-14 ENCOUNTER — LAB VISIT (OUTPATIENT)
Dept: LAB | Facility: HOSPITAL | Age: 31
End: 2021-06-14
Attending: OBSTETRICS & GYNECOLOGY
Payer: COMMERCIAL

## 2021-06-14 DIAGNOSIS — Z32.00 POSSIBLE PREGNANCY, NOT CONFIRMED: ICD-10-CM

## 2021-06-14 LAB
HCG INTACT+B SERPL-ACNC: 273 MIU/ML
PROGEST SERPL-MCNC: 23.1 NG/ML

## 2021-06-14 PROCEDURE — 36415 COLL VENOUS BLD VENIPUNCTURE: CPT | Performed by: OBSTETRICS & GYNECOLOGY

## 2021-06-14 PROCEDURE — 84144 ASSAY OF PROGESTERONE: CPT | Performed by: OBSTETRICS & GYNECOLOGY

## 2021-06-14 PROCEDURE — 84702 CHORIONIC GONADOTROPIN TEST: CPT | Performed by: OBSTETRICS & GYNECOLOGY

## 2021-06-16 ENCOUNTER — LAB VISIT (OUTPATIENT)
Dept: LAB | Facility: HOSPITAL | Age: 31
End: 2021-06-16
Attending: OBSTETRICS & GYNECOLOGY
Payer: COMMERCIAL

## 2021-06-16 DIAGNOSIS — Z32.00 PREGNANCY EXAMINATION OR TEST, PREGNANCY UNCONFIRMED: ICD-10-CM

## 2021-06-16 DIAGNOSIS — Z32.01 PREGNANCY EXAMINATION OR TEST, POSITIVE RESULT: ICD-10-CM

## 2021-06-16 LAB
HCG INTACT+B SERPL-ACNC: 605 MIU/ML
HCG INTACT+B SERPL-ACNC: 605 MIU/ML
PROGEST SERPL-MCNC: 23.8 NG/ML

## 2021-06-16 PROCEDURE — 84702 CHORIONIC GONADOTROPIN TEST: CPT | Performed by: OBSTETRICS & GYNECOLOGY

## 2021-06-16 PROCEDURE — 84144 ASSAY OF PROGESTERONE: CPT | Performed by: OBSTETRICS & GYNECOLOGY

## 2021-06-16 PROCEDURE — 36415 COLL VENOUS BLD VENIPUNCTURE: CPT | Performed by: OBSTETRICS & GYNECOLOGY

## 2021-06-28 ENCOUNTER — HOSPITAL ENCOUNTER (OUTPATIENT)
Dept: RADIOLOGY | Facility: HOSPITAL | Age: 31
Discharge: HOME | End: 2021-06-28
Attending: NURSE PRACTITIONER
Payer: COMMERCIAL

## 2021-06-28 DIAGNOSIS — M79.604 PAIN OF RIGHT LOWER EXTREMITY: ICD-10-CM

## 2021-06-28 DIAGNOSIS — M79.604 PAIN OF RIGHT LOWER EXTREMITY: Primary | ICD-10-CM

## 2021-06-28 PROCEDURE — 93971 US LOWER EXTREMITY VEINS RIGHT: ICD-10-PCS | Mod: 26,RT,, | Performed by: RADIOLOGY

## 2021-06-28 PROCEDURE — 93971 EXTREMITY STUDY: CPT | Mod: TC,RT

## 2021-06-28 PROCEDURE — 93971 EXTREMITY STUDY: CPT | Mod: 26,RT,, | Performed by: RADIOLOGY

## 2021-07-02 ENCOUNTER — SPECIALTY PHARMACY (OUTPATIENT)
Dept: PHARMACY | Facility: CLINIC | Age: 31
End: 2021-07-02

## 2021-08-04 LAB
HBV SURFACE AG SERPL QL IA: NEGATIVE
HIV-1 AND HIV-2 ANTIBODIES: NEGATIVE
RPR: NONREACTIVE
RUBELLA IMMUNE STATUS: NORMAL

## 2021-10-12 ENCOUNTER — IMMUNIZATION (OUTPATIENT)
Dept: PRIMARY CARE CLINIC | Facility: CLINIC | Age: 31
End: 2021-10-12
Payer: COMMERCIAL

## 2021-10-12 DIAGNOSIS — Z23 NEED FOR VACCINATION: Primary | ICD-10-CM

## 2021-10-12 PROCEDURE — 91300 COVID-19, MRNA, LNP-S, PF, 30 MCG/0.3 ML DOSE VACCINE: ICD-10-PCS | Mod: S$GLB,,, | Performed by: FAMILY MEDICINE

## 2021-10-12 PROCEDURE — 0003A COVID-19, MRNA, LNP-S, PF, 30 MCG/0.3 ML DOSE VACCINE: CPT | Mod: S$GLB,,, | Performed by: FAMILY MEDICINE

## 2021-10-12 PROCEDURE — 0003A COVID-19, MRNA, LNP-S, PF, 30 MCG/0.3 ML DOSE VACCINE: ICD-10-PCS | Mod: S$GLB,,, | Performed by: FAMILY MEDICINE

## 2021-10-12 PROCEDURE — 91300 COVID-19, MRNA, LNP-S, PF, 30 MCG/0.3 ML DOSE VACCINE: CPT | Mod: S$GLB,,, | Performed by: FAMILY MEDICINE

## 2021-11-08 ENCOUNTER — LAB VISIT (OUTPATIENT)
Dept: LAB | Facility: HOSPITAL | Age: 31
End: 2021-11-08
Attending: SPECIALIST
Payer: COMMERCIAL

## 2021-11-08 DIAGNOSIS — Z13.1 SCREENING FOR DIABETES MELLITUS: ICD-10-CM

## 2021-11-08 DIAGNOSIS — Z34.82 PRENATAL CARE, SUBSEQUENT PREGNANCY, SECOND TRIMESTER: Primary | ICD-10-CM

## 2021-11-08 LAB
ABO + RH BLD: NORMAL
BASOPHILS # BLD AUTO: 0.04 K/UL (ref 0–0.2)
BASOPHILS NFR BLD: 0.3 % (ref 0–1.9)
BLD GP AB SCN CELLS X3 SERPL QL: NORMAL
DIFFERENTIAL METHOD: ABNORMAL
EOSINOPHIL # BLD AUTO: 0.1 K/UL (ref 0–0.5)
EOSINOPHIL NFR BLD: 0.8 % (ref 0–8)
ERYTHROCYTE [DISTWIDTH] IN BLOOD BY AUTOMATED COUNT: 12.7 % (ref 11.5–14.5)
GLUCOSE SERPL-MCNC: 89 MG/DL (ref 70–140)
HCT VFR BLD AUTO: 34.3 % (ref 37–48.5)
HGB BLD-MCNC: 11.4 G/DL (ref 12–16)
IMM GRANULOCYTES # BLD AUTO: 0.15 K/UL (ref 0–0.04)
IMM GRANULOCYTES NFR BLD AUTO: 1.2 % (ref 0–0.5)
LYMPHOCYTES # BLD AUTO: 0.9 K/UL (ref 1–4.8)
LYMPHOCYTES NFR BLD: 7.6 % (ref 18–48)
MCH RBC QN AUTO: 31.5 PG (ref 27–31)
MCHC RBC AUTO-ENTMCNC: 33.2 G/DL (ref 32–36)
MCV RBC AUTO: 95 FL (ref 82–98)
MONOCYTES # BLD AUTO: 0.6 K/UL (ref 0.3–1)
MONOCYTES NFR BLD: 4.9 % (ref 4–15)
NEUTROPHILS # BLD AUTO: 10.6 K/UL (ref 1.8–7.7)
NEUTROPHILS NFR BLD: 85.2 % (ref 38–73)
NRBC BLD-RTO: 0 /100 WBC
PLATELET # BLD AUTO: 222 K/UL (ref 150–450)
PMV BLD AUTO: 10.1 FL (ref 9.2–12.9)
RBC # BLD AUTO: 3.62 M/UL (ref 4–5.4)
WBC # BLD AUTO: 12.44 K/UL (ref 3.9–12.7)

## 2021-11-08 PROCEDURE — 86900 BLOOD TYPING SEROLOGIC ABO: CPT | Performed by: SPECIALIST

## 2021-11-08 PROCEDURE — 85025 COMPLETE CBC W/AUTO DIFF WBC: CPT | Performed by: SPECIALIST

## 2021-11-08 PROCEDURE — 82950 GLUCOSE TEST: CPT | Performed by: SPECIALIST

## 2021-11-22 ENCOUNTER — OFFICE VISIT (OUTPATIENT)
Dept: FAMILY MEDICINE | Facility: CLINIC | Age: 31
End: 2021-11-22
Payer: COMMERCIAL

## 2021-11-22 VITALS
HEIGHT: 67 IN | SYSTOLIC BLOOD PRESSURE: 120 MMHG | WEIGHT: 189.63 LBS | OXYGEN SATURATION: 97 % | RESPIRATION RATE: 16 BRPM | TEMPERATURE: 99 F | DIASTOLIC BLOOD PRESSURE: 70 MMHG | BODY MASS INDEX: 29.76 KG/M2 | HEART RATE: 88 BPM

## 2021-11-22 DIAGNOSIS — Z34.93 THIRD TRIMESTER PREGNANCY: ICD-10-CM

## 2021-11-22 DIAGNOSIS — Z00.00 ANNUAL PHYSICAL EXAM: Primary | ICD-10-CM

## 2021-11-22 PROCEDURE — 99999 PR PBB SHADOW E&M-EST. PATIENT-LVL III: CPT | Mod: PBBFAC,,, | Performed by: FAMILY MEDICINE

## 2021-11-22 PROCEDURE — 99999 PR PBB SHADOW E&M-EST. PATIENT-LVL III: ICD-10-PCS | Mod: PBBFAC,,, | Performed by: FAMILY MEDICINE

## 2021-11-22 PROCEDURE — 99395 PREV VISIT EST AGE 18-39: CPT | Mod: S$GLB,,, | Performed by: FAMILY MEDICINE

## 2021-11-22 PROCEDURE — 99395 PR PREVENTIVE VISIT,EST,18-39: ICD-10-PCS | Mod: S$GLB,,, | Performed by: FAMILY MEDICINE

## 2021-11-23 ENCOUNTER — LAB VISIT (OUTPATIENT)
Dept: LAB | Facility: HOSPITAL | Age: 31
End: 2021-11-23
Attending: FAMILY MEDICINE
Payer: COMMERCIAL

## 2021-11-23 ENCOUNTER — PATIENT OUTREACH (OUTPATIENT)
Dept: ADMINISTRATIVE | Facility: HOSPITAL | Age: 31
End: 2021-11-23
Payer: COMMERCIAL

## 2021-11-23 DIAGNOSIS — Z00.00 ANNUAL PHYSICAL EXAM: ICD-10-CM

## 2021-11-23 LAB
ALBUMIN SERPL BCP-MCNC: 3.1 G/DL (ref 3.5–5.2)
ALP SERPL-CCNC: 59 U/L (ref 55–135)
ALT SERPL W/O P-5'-P-CCNC: 19 U/L (ref 10–44)
ANION GAP SERPL CALC-SCNC: 9 MMOL/L (ref 8–16)
AST SERPL-CCNC: 19 U/L (ref 10–40)
BILIRUB SERPL-MCNC: 0.5 MG/DL (ref 0.1–1)
BUN SERPL-MCNC: 7 MG/DL (ref 6–20)
CALCIUM SERPL-MCNC: 9.5 MG/DL (ref 8.7–10.5)
CHLORIDE SERPL-SCNC: 107 MMOL/L (ref 95–110)
CHOLEST SERPL-MCNC: 246 MG/DL (ref 120–199)
CHOLEST/HDLC SERPL: 2.8 {RATIO} (ref 2–5)
CO2 SERPL-SCNC: 21 MMOL/L (ref 23–29)
CREAT SERPL-MCNC: 0.6 MG/DL (ref 0.5–1.4)
EST. GFR  (AFRICAN AMERICAN): >60 ML/MIN/1.73 M^2
EST. GFR  (NON AFRICAN AMERICAN): >60 ML/MIN/1.73 M^2
GLUCOSE SERPL-MCNC: 96 MG/DL (ref 70–110)
HDLC SERPL-MCNC: 88 MG/DL (ref 40–75)
HDLC SERPL: 35.8 % (ref 20–50)
LDLC SERPL CALC-MCNC: 132.6 MG/DL (ref 63–159)
NONHDLC SERPL-MCNC: 158 MG/DL
POTASSIUM SERPL-SCNC: 4.1 MMOL/L (ref 3.5–5.1)
PROT SERPL-MCNC: 6.6 G/DL (ref 6–8.4)
SODIUM SERPL-SCNC: 137 MMOL/L (ref 136–145)
TRIGL SERPL-MCNC: 127 MG/DL (ref 30–150)

## 2021-11-23 PROCEDURE — 80053 COMPREHEN METABOLIC PANEL: CPT | Performed by: FAMILY MEDICINE

## 2021-11-23 PROCEDURE — 80061 LIPID PANEL: CPT | Performed by: FAMILY MEDICINE

## 2021-11-23 PROCEDURE — 36415 COLL VENOUS BLD VENIPUNCTURE: CPT | Performed by: FAMILY MEDICINE

## 2021-12-10 ENCOUNTER — PATIENT OUTREACH (OUTPATIENT)
Dept: ADMINISTRATIVE | Facility: HOSPITAL | Age: 31
End: 2021-12-10
Payer: COMMERCIAL

## 2021-12-17 ENCOUNTER — OFFICE VISIT (OUTPATIENT)
Dept: URGENT CARE | Facility: CLINIC | Age: 31
End: 2021-12-17
Payer: COMMERCIAL

## 2021-12-17 VITALS
SYSTOLIC BLOOD PRESSURE: 126 MMHG | HEIGHT: 67 IN | RESPIRATION RATE: 18 BRPM | DIASTOLIC BLOOD PRESSURE: 79 MMHG | WEIGHT: 191 LBS | TEMPERATURE: 99 F | HEART RATE: 91 BPM | OXYGEN SATURATION: 97 % | BODY MASS INDEX: 29.98 KG/M2

## 2021-12-17 DIAGNOSIS — R05.9 COUGH IN ADULT PATIENT: Primary | ICD-10-CM

## 2021-12-17 DIAGNOSIS — Z3A.30 30 WEEKS GESTATION OF PREGNANCY: ICD-10-CM

## 2021-12-17 DIAGNOSIS — J32.9 RHINOSINUSITIS: ICD-10-CM

## 2021-12-17 LAB
CTP QC/QA: YES
CTP QC/QA: YES
FLUAV AG NPH QL: NEGATIVE
FLUBV AG NPH QL: NEGATIVE
SARS-COV-2 RDRP RESP QL NAA+PROBE: NEGATIVE

## 2021-12-17 PROCEDURE — 87804 POCT INFLUENZA A/B: ICD-10-PCS | Mod: QW,,, | Performed by: NURSE PRACTITIONER

## 2021-12-17 PROCEDURE — 99204 PR OFFICE/OUTPT VISIT, NEW, LEVL IV, 45-59 MIN: ICD-10-PCS | Mod: S$GLB,,, | Performed by: NURSE PRACTITIONER

## 2021-12-17 PROCEDURE — 99204 OFFICE O/P NEW MOD 45 MIN: CPT | Mod: S$GLB,,, | Performed by: NURSE PRACTITIONER

## 2021-12-17 PROCEDURE — 87804 INFLUENZA ASSAY W/OPTIC: CPT | Mod: QW,,, | Performed by: NURSE PRACTITIONER

## 2021-12-17 PROCEDURE — U0002: ICD-10-PCS | Mod: QW,S$GLB,, | Performed by: NURSE PRACTITIONER

## 2021-12-17 PROCEDURE — U0002 COVID-19 LAB TEST NON-CDC: HCPCS | Mod: QW,S$GLB,, | Performed by: NURSE PRACTITIONER

## 2021-12-17 RX ORDER — PROMETHAZINE HYDROCHLORIDE AND DEXTROMETHORPHAN HYDROBROMIDE 6.25; 15 MG/5ML; MG/5ML
5 SYRUP ORAL EVERY 4 HOURS PRN
Qty: 240 ML | Refills: 0 | Status: SHIPPED | OUTPATIENT
Start: 2021-12-17 | End: 2021-12-27

## 2021-12-17 RX ORDER — AZELASTINE 1 MG/ML
1 SPRAY, METERED NASAL 2 TIMES DAILY
Qty: 30 ML | Refills: 0 | Status: ON HOLD | OUTPATIENT
Start: 2021-12-17 | End: 2022-02-23

## 2021-12-17 RX ORDER — PSEUDOEPHEDRINE HCL 120 MG/1
120 TABLET, FILM COATED, EXTENDED RELEASE ORAL 2 TIMES DAILY
Qty: 30 TABLET | Refills: 0 | Status: SHIPPED | OUTPATIENT
Start: 2021-12-17 | End: 2022-01-01

## 2022-01-07 ENCOUNTER — OFFICE VISIT (OUTPATIENT)
Dept: PEDIATRICS | Facility: CLINIC | Age: 32
End: 2022-01-07
Payer: COMMERCIAL

## 2022-01-07 DIAGNOSIS — Z03.89 NO DIAGNOSIS OR CONDITION ON AXIS I: Primary | ICD-10-CM

## 2022-01-07 PROCEDURE — 99499 NO LOS: ICD-10-PCS | Mod: S$GLB,,, | Performed by: PEDIATRICS

## 2022-01-07 PROCEDURE — 99499 UNLISTED E&M SERVICE: CPT | Mod: S$GLB,,, | Performed by: PEDIATRICS

## 2022-01-19 LAB — PRENATAL STREP B CULTURE: NEGATIVE

## 2022-01-20 NOTE — PROGRESS NOTES
Prenatal Meet & Greet with prospective parents.   Planning on breastfeeding.   Infant to be born in [place], then to come to [facility] for regular pediatric care.

## 2022-02-22 ENCOUNTER — HOSPITAL ENCOUNTER (INPATIENT)
Facility: HOSPITAL | Age: 32
LOS: 3 days | Discharge: HOME | End: 2022-02-25
Attending: SPECIALIST | Admitting: SPECIALIST
Payer: COMMERCIAL

## 2022-02-22 DIAGNOSIS — Z34.90 ENCOUNTER FOR INDUCTION OF LABOR: ICD-10-CM

## 2022-02-22 DIAGNOSIS — Z34.90 PREGNANCY: ICD-10-CM

## 2022-02-22 LAB
ABO + RH BLD: NORMAL
AMPHET+METHAMPHET UR QL: NEGATIVE
BARBITURATES UR QL SCN>200 NG/ML: NEGATIVE
BASOPHILS # BLD AUTO: 0.04 K/UL (ref 0–0.2)
BASOPHILS NFR BLD: 0.3 % (ref 0–1.9)
BENZODIAZ UR QL SCN>200 NG/ML: NEGATIVE
BILIRUB UR QL STRIP: NEGATIVE
BLD GP AB SCN CELLS X3 SERPL QL: NORMAL
BUPRENORPHINE UR QL: NEGATIVE
BZE UR QL SCN: NEGATIVE
CANNABINOIDS UR QL SCN: NEGATIVE
CLARITY UR: CLEAR
COLOR UR: YELLOW
CREAT UR-MCNC: 167 MG/DL (ref 15–325)
DIFFERENTIAL METHOD: ABNORMAL
EOSINOPHIL # BLD AUTO: 0.1 K/UL (ref 0–0.5)
EOSINOPHIL NFR BLD: 1 % (ref 0–8)
ERYTHROCYTE [DISTWIDTH] IN BLOOD BY AUTOMATED COUNT: 13.3 % (ref 11.5–14.5)
GLUCOSE UR QL STRIP: NEGATIVE
HCT VFR BLD AUTO: 31.8 % (ref 37–48.5)
HGB BLD-MCNC: 10.9 G/DL (ref 12–16)
HGB UR QL STRIP: NEGATIVE
IMM GRANULOCYTES # BLD AUTO: 0.21 K/UL (ref 0–0.04)
IMM GRANULOCYTES NFR BLD AUTO: 1.6 % (ref 0–0.5)
KETONES UR QL STRIP: ABNORMAL
LEUKOCYTE ESTERASE UR QL STRIP: NEGATIVE
LYMPHOCYTES # BLD AUTO: 1.1 K/UL (ref 1–4.8)
LYMPHOCYTES NFR BLD: 7.8 % (ref 18–48)
MCH RBC QN AUTO: 31.1 PG (ref 27–31)
MCHC RBC AUTO-ENTMCNC: 34.3 G/DL (ref 32–36)
MCV RBC AUTO: 91 FL (ref 82–98)
MONOCYTES # BLD AUTO: 0.8 K/UL (ref 0.3–1)
MONOCYTES NFR BLD: 6 % (ref 4–15)
NEUTROPHILS # BLD AUTO: 11.3 K/UL (ref 1.8–7.7)
NEUTROPHILS NFR BLD: 83.3 % (ref 38–73)
NITRITE UR QL STRIP: NEGATIVE
NRBC BLD-RTO: 0 /100 WBC
OPIATES UR QL SCN: NEGATIVE
PCP UR QL SCN>25 NG/ML: NEGATIVE
PH UR STRIP: 7 [PH] (ref 5–8)
PLATELET # BLD AUTO: 216 K/UL (ref 150–450)
PMV BLD AUTO: 10.1 FL (ref 9.2–12.9)
PROT UR QL STRIP: ABNORMAL
RBC # BLD AUTO: 3.51 M/UL (ref 4–5.4)
SARS-COV-2 RDRP RESP QL NAA+PROBE: NEGATIVE
SP GR UR STRIP: 1.02 (ref 1–1.03)
TOXICOLOGY INFORMATION: NORMAL
URN SPEC COLLECT METH UR: ABNORMAL
UROBILINOGEN UR STRIP-ACNC: NEGATIVE EU/DL
WBC # BLD AUTO: 13.54 K/UL (ref 3.9–12.7)

## 2022-02-22 PROCEDURE — 85025 COMPLETE CBC W/AUTO DIFF WBC: CPT | Performed by: SPECIALIST

## 2022-02-22 PROCEDURE — 80307 DRUG TEST PRSMV CHEM ANLYZR: CPT | Performed by: SPECIALIST

## 2022-02-22 PROCEDURE — 63600175 PHARM REV CODE 636 W HCPCS: Performed by: SPECIALIST

## 2022-02-22 PROCEDURE — 12000002 HC ACUTE/MED SURGE SEMI-PRIVATE ROOM

## 2022-02-22 PROCEDURE — 86850 RBC ANTIBODY SCREEN: CPT | Performed by: SPECIALIST

## 2022-02-22 PROCEDURE — 86592 SYPHILIS TEST NON-TREP QUAL: CPT | Performed by: SPECIALIST

## 2022-02-22 PROCEDURE — 81003 URINALYSIS AUTO W/O SCOPE: CPT | Mod: 59 | Performed by: SPECIALIST

## 2022-02-22 PROCEDURE — 25000003 PHARM REV CODE 250: Performed by: SPECIALIST

## 2022-02-22 PROCEDURE — U0002 COVID-19 LAB TEST NON-CDC: HCPCS | Performed by: SPECIALIST

## 2022-02-22 RX ORDER — MISOPROSTOL 100 MCG
25 TABLET ORAL EVERY 4 HOURS PRN
Status: COMPLETED | OUTPATIENT
Start: 2022-02-22 | End: 2022-02-23

## 2022-02-22 RX ORDER — CARBOPROST TROMETHAMINE 250 UG/ML
250 INJECTION, SOLUTION INTRAMUSCULAR
Status: DISCONTINUED | OUTPATIENT
Start: 2022-02-22 | End: 2022-02-24

## 2022-02-22 RX ORDER — BUTORPHANOL TARTRATE 2 MG/ML
1 INJECTION INTRAMUSCULAR; INTRAVENOUS EVERY 4 HOURS PRN
Status: DISCONTINUED | OUTPATIENT
Start: 2022-02-22 | End: 2022-02-24

## 2022-02-22 RX ORDER — ACETAMINOPHEN 325 MG/1
650 TABLET ORAL EVERY 6 HOURS PRN
Status: DISCONTINUED | OUTPATIENT
Start: 2022-02-22 | End: 2022-02-24

## 2022-02-22 RX ORDER — MISOPROSTOL 200 UG/1
800 TABLET ORAL
Status: DISCONTINUED | OUTPATIENT
Start: 2022-02-22 | End: 2022-02-24

## 2022-02-22 RX ORDER — SIMETHICONE 80 MG
1 TABLET,CHEWABLE ORAL 4 TIMES DAILY PRN
Status: DISCONTINUED | OUTPATIENT
Start: 2022-02-22 | End: 2022-02-24

## 2022-02-22 RX ORDER — SODIUM CHLORIDE, SODIUM LACTATE, POTASSIUM CHLORIDE, CALCIUM CHLORIDE 600; 310; 30; 20 MG/100ML; MG/100ML; MG/100ML; MG/100ML
INJECTION, SOLUTION INTRAVENOUS CONTINUOUS
Status: DISCONTINUED | OUTPATIENT
Start: 2022-02-22 | End: 2022-02-24

## 2022-02-22 RX ORDER — METHYLERGONOVINE MALEATE 0.2 MG/ML
200 INJECTION INTRAVENOUS
Status: DISCONTINUED | OUTPATIENT
Start: 2022-02-22 | End: 2022-02-24

## 2022-02-22 RX ORDER — CALCIUM CARBONATE 200(500)MG
500 TABLET,CHEWABLE ORAL 3 TIMES DAILY PRN
Status: DISCONTINUED | OUTPATIENT
Start: 2022-02-22 | End: 2022-02-24

## 2022-02-22 RX ADMIN — MISOPROSTOL 25 MCG: 100 TABLET ORAL at 09:02

## 2022-02-22 RX ADMIN — SODIUM CHLORIDE, SODIUM LACTATE, POTASSIUM CHLORIDE, AND CALCIUM CHLORIDE: .6; .31; .03; .02 INJECTION, SOLUTION INTRAVENOUS at 09:02

## 2022-02-22 RX ADMIN — PROMETHAZINE HYDROCHLORIDE 12.5 MG: 25 INJECTION INTRAMUSCULAR; INTRAVENOUS at 10:02

## 2022-02-23 ENCOUNTER — ANESTHESIA EVENT (OUTPATIENT)
Dept: OBSTETRICS AND GYNECOLOGY | Facility: HOSPITAL | Age: 32
End: 2022-02-23
Payer: COMMERCIAL

## 2022-02-23 ENCOUNTER — ANESTHESIA (OUTPATIENT)
Dept: OBSTETRICS AND GYNECOLOGY | Facility: HOSPITAL | Age: 32
End: 2022-02-23
Payer: COMMERCIAL

## 2022-02-23 PROBLEM — O41.1230 CHORIOAMNIONITIS IN THIRD TRIMESTER: Status: ACTIVE | Noted: 2022-02-23

## 2022-02-23 PROBLEM — Z34.90 ENCOUNTER FOR INDUCTION OF LABOR: Status: ACTIVE | Noted: 2022-02-23

## 2022-02-23 LAB — RPR SER QL: NORMAL

## 2022-02-23 PROCEDURE — 62326 NJX INTERLAMINAR LMBR/SAC: CPT | Performed by: ANESTHESIOLOGY

## 2022-02-23 PROCEDURE — 63600175 PHARM REV CODE 636 W HCPCS: Performed by: ANESTHESIOLOGY

## 2022-02-23 PROCEDURE — 25000003 PHARM REV CODE 250: Performed by: ANESTHESIOLOGY

## 2022-02-23 PROCEDURE — C1751 CATH, INF, PER/CENT/MIDLINE: HCPCS | Performed by: ANESTHESIOLOGY

## 2022-02-23 PROCEDURE — 72200004 HC VAGINAL DELIVERY LEVEL I

## 2022-02-23 PROCEDURE — 12000002 HC ACUTE/MED SURGE SEMI-PRIVATE ROOM

## 2022-02-23 PROCEDURE — 25000003 PHARM REV CODE 250: Performed by: SPECIALIST

## 2022-02-23 PROCEDURE — 27200710 HC EPIDURAL INFUSION PUMP SET: Performed by: ANESTHESIOLOGY

## 2022-02-23 PROCEDURE — 63600175 PHARM REV CODE 636 W HCPCS: Performed by: SPECIALIST

## 2022-02-23 RX ORDER — ACETAMINOPHEN 500 MG
1000 TABLET ORAL EVERY 6 HOURS PRN
Status: DISCONTINUED | OUTPATIENT
Start: 2022-02-23 | End: 2022-02-24

## 2022-02-23 RX ORDER — CEFAZOLIN SODIUM 2 G/50ML
2 SOLUTION INTRAVENOUS ONCE
Status: COMPLETED | OUTPATIENT
Start: 2022-02-23 | End: 2022-02-23

## 2022-02-23 RX ORDER — ROPIVACAINE HYDROCHLORIDE 2 MG/ML
INJECTION, SOLUTION EPIDURAL; INFILTRATION
Status: COMPLETED | OUTPATIENT
Start: 2022-02-23 | End: 2022-02-23

## 2022-02-23 RX ORDER — SODIUM CHLORIDE 9 MG/ML
INJECTION, SOLUTION INTRAVENOUS
Status: DISCONTINUED | OUTPATIENT
Start: 2022-02-23 | End: 2022-02-24

## 2022-02-23 RX ORDER — DIPHENHYDRAMINE HYDROCHLORIDE 50 MG/ML
12.5 INJECTION INTRAMUSCULAR; INTRAVENOUS EVERY 4 HOURS PRN
Status: DISCONTINUED | OUTPATIENT
Start: 2022-02-23 | End: 2022-02-24

## 2022-02-23 RX ORDER — ONDANSETRON 2 MG/ML
4 INJECTION INTRAMUSCULAR; INTRAVENOUS ONCE AS NEEDED
Status: DISCONTINUED | OUTPATIENT
Start: 2022-02-23 | End: 2022-02-24

## 2022-02-23 RX ORDER — EPHEDRINE SULFATE 50 MG/ML
5 INJECTION, SOLUTION INTRAVENOUS ONCE AS NEEDED
Status: DISCONTINUED | OUTPATIENT
Start: 2022-02-23 | End: 2022-02-24

## 2022-02-23 RX ORDER — ONDANSETRON 2 MG/ML
4 INJECTION INTRAMUSCULAR; INTRAVENOUS ONCE
Status: COMPLETED | OUTPATIENT
Start: 2022-02-23 | End: 2022-02-23

## 2022-02-23 RX ORDER — NALOXONE HCL 0.4 MG/ML
0.4 VIAL (ML) INJECTION SEE ADMIN INSTRUCTIONS
Status: DISCONTINUED | OUTPATIENT
Start: 2022-02-23 | End: 2022-02-24

## 2022-02-23 RX ORDER — OXYTOCIN-SODIUM CHLORIDE 0.9% IV SOLN 30 UNIT/500ML 30-0.9/5 UT/ML-%
0-30 SOLUTION INTRAVENOUS CONTINUOUS
Status: DISCONTINUED | OUTPATIENT
Start: 2022-02-23 | End: 2022-02-24

## 2022-02-23 RX ORDER — PANTOPRAZOLE SODIUM 40 MG/1
40 TABLET, DELAYED RELEASE ORAL DAILY
COMMUNITY
Start: 2021-12-28 | End: 2022-02-25

## 2022-02-23 RX ORDER — FENTANYL/BUPIVACAINE/NS/PF 2MCG/ML-.1
12 PLASTIC BAG, INJECTION (ML) INJECTION CONTINUOUS
Status: DISCONTINUED | OUTPATIENT
Start: 2022-02-23 | End: 2022-02-24

## 2022-02-23 RX ADMIN — CEFAZOLIN SODIUM 2 G: 2 SOLUTION INTRAVENOUS at 09:02

## 2022-02-23 RX ADMIN — ONDANSETRON 4 MG: 2 INJECTION INTRAMUSCULAR; INTRAVENOUS at 09:02

## 2022-02-23 RX ADMIN — Medication 12 ML/HR: at 11:02

## 2022-02-23 RX ADMIN — ROPIVACAINE HYDROCHLORIDE 10 ML: 2 INJECTION, SOLUTION EPIDURAL; INFILTRATION at 11:02

## 2022-02-23 RX ADMIN — SODIUM PHOSPHATE 1 ENEMA: 7; 19 ENEMA RECTAL at 05:02

## 2022-02-23 RX ADMIN — MISOPROSTOL 25 MCG: 100 TABLET ORAL at 01:02

## 2022-02-23 RX ADMIN — Medication 2 MILLI-UNITS/MIN: at 05:02

## 2022-02-23 RX ADMIN — ACETAMINOPHEN 1000 MG: 500 TABLET ORAL at 08:02

## 2022-02-23 NOTE — SUBJECTIVE & OBJECTIVE
Interval History:  Annalise is a 31 y.o.  at 40w3d. She is doing well.  No complaints, comfortable with epidural    Objective:     Vital Signs (Most Recent):  Temp: 98.1 °F (36.7 °C) (22 1318)  Pulse: 67 (22 1318)  Resp: 16 (22 1318)  BP: 118/72 (22 1318) Vital Signs (24h Range):  Temp:  [97.9 °F (36.6 °C)-98.3 °F (36.8 °C)] 98.1 °F (36.7 °C)  Pulse:  [] 67  Resp:  [16-18] 16  BP: ()/(50-82) 118/72     Weight: 89.8 kg (198 lb)  Body mass index is 31.01 kg/m².    FHT:  Baseline 130s and reassuring   TOCO:  Q 2-4 minutes    Cervical Exam:  Dilation:  3  Effacement:  80%  Station: -2  Presentation: Vertex     Significant Labs:  Lab Results   Component Value Date    GROUPTRH A POS 2022    HEPBSAG Negative 2021    STREPBCULT negative 2022       I have personallly reviewed all pertinent lab results from the last 24 hours.    Physical Exam

## 2022-02-23 NOTE — ANESTHESIA PROCEDURE NOTES
Epidural    Patient location during procedure: OB   Reason for block: primary anesthetic   Reason for block: labor analgesia requested by patient and obstetrician  Diagnosis: Intrauterine pregnancy   Start time: 2/23/2022 11:01 AM  Timeout: 2/23/2022 11:00 AM  End time: 2/23/2022 11:14 AM    Staffing  Performing Provider: Norman Santo Jr., MD  Authorizing Provider: Norman Santo Jr., MD        Preanesthetic Checklist  Completed: patient identified, IV checked, site marked, risks and benefits discussed, surgical consent, monitors and equipment checked, pre-op evaluation, timeout performed, anesthesia consent given, hand hygiene performed and patient being monitored  Preparation  Patient position: sitting  Prep: Betadine and ChloraPrep  Patient monitoring: ECG and Blood Pressure  Reason for block: primary anesthetic   Epidural  Skin Anesthetic: lidocaine 1%  Skin Wheal: 3 mL  Administration type: continuous  Approach: midline  Interspace: L3-4    Injection technique: VIRAL air  Needle and Epidural Catheter  Needle type: Tuohy   Needle gauge: 17  Needle length: 3.5 inches  Catheter type: springwound and multi-orifice  Catheter size: 19 G  Insertion Attempts: 1  Test dose: 3 mL of lidocaine 1.5% with Epi 1-to-200,000  Additional Documentation: incremental injection, negative aspiration for heme and CSF, no paresthesia on injection, no significant pain on injection, no significant complaints from patient and no signs/symptoms of IV or SA injection  Needle localization: anatomical landmarks  Assessment  Upper dermatomal levels - Left: T6  Right: T6   Dermatomal levels determined by pinch or prick  Ease of block: easy  Patient's tolerance of the procedure: no complaints and comfortable throughout block No inadvertent dural puncture with Tuohy.  Dural puncture not performed with spinal needle    Medications:  Medication Administration Time: 2/23/2022 11:12 AM  Medications: ropivacaine (NAROPIN) solution 0.2% -  Epidural   10 mL - 2/23/2022 11:12:00 AM

## 2022-02-23 NOTE — PROGRESS NOTES
[facility]  Obstetrics  Labor Progress Note    Patient Name: Annalise Nava  MRN: [id number]  Admission Date: 2022  Hospital Length of Stay: 1 days  Attending Physician: Toby Chavez MD  Primary Care Provider: Ene Rene MD    Subjective:     Principal Problem:Encounter for induction of labor    Hospital Course:  31-year-old  2 para 0010 at 40 weeks and 3 days gestational age admitted for induction of labor      Interval History:  Annalise is a 31 y.o.  at 40w3d. She is doing well.  Patient feeling mild contractions, states active fetal movement denies rupture membranes    Objective:     Vital Signs (Most Recent):  Temp: 98.2 °F (36.8 °C) (22)  Pulse: 79 (22)  BP: 124/75 (22) Vital Signs (24h Range):  Temp:  [98.2 °F (36.8 °C)] 98.2 °F (36.8 °C)  Pulse:  [79-84] 79  BP: (121-124)/(71-75) 124/75     Weight: 89.8 kg (198 lb)  Body mass index is 31.01 kg/m².    FHT:  Baseline 130s and reactive   TOCO:  Q 2-4 minutes    Cervical Exam:  Dilation:  1  Effacement:  60%  Station: -3  Presentation: Vertex     Significant Labs:  Lab Results   Component Value Date    GROUPTRH A POS 2022    HEPBSAG Negative 2021    STREPBCULT negative 2022       CBC:   Recent Labs   Lab 22   WBC 13.54*   RBC 3.51*   HGB 10.9*   HCT 31.8*      MCV 91   MCH 31.1*   MCHC 34.3     I have personallly reviewed all pertinent lab results from the last 24 hours.    Physical Exam  General-no apparent distress  Abdomen/uterus- nontender  Extremities-no calf tenderness    Assessment/Plan:     31 y.o. female  at 40w3d for:    * Encounter for induction of labor  IUP at 40 weeks and 3 days gestational age  Induction of labor-status post Cytotec, currently on Pitocin   AROM-clear  GBS negative  Rh positive  Epidural when desires          Vinh Chavez MD  Obstetrics  [facility]

## 2022-02-23 NOTE — ANESTHESIA PREPROCEDURE EVALUATION
02/23/2022  Annalise Nava is a 31 y.o., female.      Pre-op Assessment    I have reviewed the Patient Summary Reports.     I have reviewed the Nursing Notes. I have reviewed the NPO Status.   I have reviewed the Medications.     Review of Systems  Anesthesia Hx:  No problems with previous Anesthesia Denies Hx of Anesthetic complications  Neg history of prior surgery. Denies Family Hx of Anesthesia complications.   Denies Personal Hx of Anesthesia complications.   Hematology/Oncology:  Hematology Normal   Oncology Normal     EENT/Dental:EENT/Dental Normal   Cardiovascular:  Cardiovascular Normal     Pulmonary:  Pulmonary Normal    Renal/:  Renal/ Normal     Hepatic/GI:   IBS   Musculoskeletal:  Musculoskeletal Normal    Neurological:  Neurology Normal    Endocrine:   Denies Diabetes.    Dermatological:  Skin Normal    Psych:  Psychiatric Normal           Physical Exam  General: Well nourished and Alert    Airway:  Mallampati: II   Mouth Opening: Normal  TM Distance: Normal  Tongue: Normal  Neck ROM: Normal ROM    Dental:  Intact    Chest/Lungs:  Clear to auscultation, Normal Respiratory Rate    Heart:  Rate: Normal  Rhythm: Regular Rhythm  Sounds: Normal        Anesthesia Plan  Type of Anesthesia, risks & benefits discussed:    Anesthesia Type: Epidural  Intra-op Monitoring Plan: Standard ASA Monitors  Informed Consent: Informed consent signed with the Patient and all parties understand the risks and agree with anesthesia plan.  All questions answered.   ASA Score: 2    Ready For Surgery From Anesthesia Perspective.     .

## 2022-02-23 NOTE — ASSESSMENT & PLAN NOTE
IUP at 40 weeks and 3 days gestational age  Induction of labor-status post Cytotec, currently on Pitocin-progressing   AROM-clear  GBS negative  Rh positive  Comfortable with epidural in place

## 2022-02-23 NOTE — ASSESSMENT & PLAN NOTE
IUP at 40 weeks and 3 days gestational age  Induction of labor-status post Cytotec, currently on Pitocin   AROM-clear  GBS negative  Rh positive  Epidural when desires

## 2022-02-23 NOTE — SUBJECTIVE & OBJECTIVE
Interval History:  Annalise is a 31 y.o.  at 40w3d. She is doing well.  Patient feeling mild contractions, states active fetal movement denies rupture membranes    Objective:     Vital Signs (Most Recent):  Temp: 98.2 °F (36.8 °C) (22)  Pulse: 79 (22 0658)  BP: 124/75 (22 0658) Vital Signs (24h Range):  Temp:  [98.2 °F (36.8 °C)] 98.2 °F (36.8 °C)  Pulse:  [79-84] 79  BP: (121-124)/(71-75) 124/75     Weight: 89.8 kg (198 lb)  Body mass index is 31.01 kg/m².    FHT:  Baseline 130s and reactive   TOCO:  Q 2-4 minutes    Cervical Exam:  Dilation:  1  Effacement:  60%  Station: -3  Presentation: Vertex     Significant Labs:  Lab Results   Component Value Date    GROUPTRH A POS 2022    HEPBSAG Negative 2021    STREPBCULT negative 2022       CBC:   Recent Labs   Lab 22   WBC 13.54*   RBC 3.51*   HGB 10.9*   HCT 31.8*      MCV 91   MCH 31.1*   MCHC 34.3     I have personallly reviewed all pertinent lab results from the last 24 hours.    Physical Exam  General-no apparent distress  Abdomen/uterus- nontender  Extremities-no calf tenderness

## 2022-02-23 NOTE — PLAN OF CARE
Problem: Bleeding (Labor)  Goal: Hemostasis  Outcome: Ongoing, Progressing     Problem: Change in Fetal Wellbeing (Labor)  Goal: Stable Fetal Wellbeing  Outcome: Ongoing, Progressing     Problem: Delayed Labor Progression (Labor)  Goal: Effective Progression to Delivery  Outcome: Ongoing, Progressing     Problem: Infection (Labor)  Goal: Absence of Infection Signs and Symptoms  Outcome: Ongoing, Progressing     Problem: Labor Pain (Labor)  Goal: Acceptable Pain Control  Outcome: Ongoing, Progressing     Problem: Uterine Tachysystole (Labor)  Goal: Normal Uterine Contraction Pattern  Outcome: Ongoing, Progressing

## 2022-02-23 NOTE — PROGRESS NOTES
Update Dr. Chavez concerning patients status. Epidural in place. SVE 2/80/-3. Orders to increase Pit until a max of 30 if needed.

## 2022-02-23 NOTE — HOSPITAL COURSE
31-year-old  2 para 0010 at 40 weeks and 3 days gestational age admitted for induction of labor, patient underwent uncomplicated spontaneous vaginal delivery.

## 2022-02-23 NOTE — PROGRESS NOTES
[facility]  Obstetrics  Labor Progress Note    Patient Name: Annalise Nava  MRN: [id number]  Admission Date: 2022  Hospital Length of Stay: 1 days  Attending Physician: Toby Chavez MD  Primary Care Provider: Ene Rene MD    Subjective:     Principal Problem:Encounter for induction of labor    Hospital Course:  31-year-old  2 para 0010 at 40 weeks and 3 days gestational age admitted for induction of labor      Interval History:  Annalise is a 31 y.o.  at 40w3d. She is doing well.  No complaints, comfortable with epidural    Objective:     Vital Signs (Most Recent):  Temp: 98.1 °F (36.7 °C) (22 1318)  Pulse: 67 (22 1318)  Resp: 16 (22 1318)  BP: 118/72 (22 1318) Vital Signs (24h Range):  Temp:  [97.9 °F (36.6 °C)-98.3 °F (36.8 °C)] 98.1 °F (36.7 °C)  Pulse:  [] 67  Resp:  [16-18] 16  BP: ()/(50-82) 118/72     Weight: 89.8 kg (198 lb)  Body mass index is 31.01 kg/m².    FHT:  Baseline 130s and reassuring   TOCO:  Q 2-4 minutes    Cervical Exam:  Dilation:  3  Effacement:  80%  Station: -2  Presentation: Vertex     Significant Labs:  Lab Results   Component Value Date    GROUPTRH A POS 2022    HEPBSAG Negative 2021    STREPBCULT negative 2022       I have personallly reviewed all pertinent lab results from the last 24 hours.    Physical Exam    Assessment/Plan:     31 y.o. female  at 40w3d for:    * Encounter for induction of labor  IUP at 40 weeks and 3 days gestational age  Induction of labor-status post Cytotec, currently on Pitocin-progressing   AROM-clear  GBS negative  Rh positive  Comfortable with epidural in place          Vinh Chavez MD  Obstetrics  [facility]

## 2022-02-24 LAB
BASOPHILS # BLD AUTO: 0.05 K/UL (ref 0–0.2)
BASOPHILS NFR BLD: 0.2 % (ref 0–1.9)
DIFFERENTIAL METHOD: ABNORMAL
EOSINOPHIL # BLD AUTO: 0.1 K/UL (ref 0–0.5)
EOSINOPHIL NFR BLD: 0.2 % (ref 0–8)
ERYTHROCYTE [DISTWIDTH] IN BLOOD BY AUTOMATED COUNT: 13.3 % (ref 11.5–14.5)
HCT VFR BLD AUTO: 30.6 % (ref 37–48.5)
HGB BLD-MCNC: 10 G/DL (ref 12–16)
IMM GRANULOCYTES # BLD AUTO: 0.45 K/UL (ref 0–0.04)
IMM GRANULOCYTES NFR BLD AUTO: 2.1 % (ref 0–0.5)
LYMPHOCYTES # BLD AUTO: 1 K/UL (ref 1–4.8)
LYMPHOCYTES NFR BLD: 4.8 % (ref 18–48)
MCH RBC QN AUTO: 30.7 PG (ref 27–31)
MCHC RBC AUTO-ENTMCNC: 32.7 G/DL (ref 32–36)
MCV RBC AUTO: 94 FL (ref 82–98)
MONOCYTES # BLD AUTO: 1.8 K/UL (ref 0.3–1)
MONOCYTES NFR BLD: 8.3 % (ref 4–15)
NEUTROPHILS # BLD AUTO: 17.7 K/UL (ref 1.8–7.7)
NEUTROPHILS NFR BLD: 84.4 % (ref 38–73)
NRBC BLD-RTO: 0 /100 WBC
PLATELET # BLD AUTO: 208 K/UL (ref 150–450)
PMV BLD AUTO: 10.1 FL (ref 9.2–12.9)
RBC # BLD AUTO: 3.26 M/UL (ref 4–5.4)
WBC # BLD AUTO: 21.03 K/UL (ref 3.9–12.7)

## 2022-02-24 PROCEDURE — 85025 COMPLETE CBC W/AUTO DIFF WBC: CPT | Performed by: SPECIALIST

## 2022-02-24 PROCEDURE — 12000002 HC ACUTE/MED SURGE SEMI-PRIVATE ROOM

## 2022-02-24 PROCEDURE — 25000003 PHARM REV CODE 250: Performed by: SPECIALIST

## 2022-02-24 PROCEDURE — 36415 COLL VENOUS BLD VENIPUNCTURE: CPT | Performed by: SPECIALIST

## 2022-02-24 RX ORDER — AMOXICILLIN 250 MG
1 CAPSULE ORAL 2 TIMES DAILY PRN
Status: DISCONTINUED | OUTPATIENT
Start: 2022-02-24 | End: 2022-02-25 | Stop reason: HOSPADM

## 2022-02-24 RX ORDER — MUPIROCIN 20 MG/G
OINTMENT TOPICAL
Status: DISCONTINUED | OUTPATIENT
Start: 2022-02-24 | End: 2022-02-25 | Stop reason: HOSPADM

## 2022-02-24 RX ORDER — SODIUM CHLORIDE 0.9 % (FLUSH) 0.9 %
3 SYRINGE (ML) INJECTION EVERY 8 HOURS
Status: DISCONTINUED | OUTPATIENT
Start: 2022-02-24 | End: 2022-02-24

## 2022-02-24 RX ORDER — OXYCODONE AND ACETAMINOPHEN 5; 325 MG/1; MG/1
1 TABLET ORAL EVERY 4 HOURS PRN
Status: DISCONTINUED | OUTPATIENT
Start: 2022-02-24 | End: 2022-02-25 | Stop reason: HOSPADM

## 2022-02-24 RX ORDER — PROCHLORPERAZINE EDISYLATE 5 MG/ML
5 INJECTION INTRAMUSCULAR; INTRAVENOUS EVERY 6 HOURS PRN
Status: DISCONTINUED | OUTPATIENT
Start: 2022-02-24 | End: 2022-02-25 | Stop reason: HOSPADM

## 2022-02-24 RX ORDER — DOCUSATE SODIUM 100 MG/1
200 CAPSULE, LIQUID FILLED ORAL 2 TIMES DAILY PRN
Status: DISCONTINUED | OUTPATIENT
Start: 2022-02-24 | End: 2022-02-25 | Stop reason: HOSPADM

## 2022-02-24 RX ORDER — ACETAMINOPHEN 325 MG/1
650 TABLET ORAL EVERY 6 HOURS PRN
Status: DISCONTINUED | OUTPATIENT
Start: 2022-02-24 | End: 2022-02-25 | Stop reason: HOSPADM

## 2022-02-24 RX ORDER — OXYTOCIN-SODIUM CHLORIDE 0.9% IV SOLN 30 UNIT/500ML 30-0.9/5 UT/ML-%
30 SOLUTION INTRAVENOUS ONCE
Status: DISCONTINUED | OUTPATIENT
Start: 2022-02-24 | End: 2022-02-25 | Stop reason: HOSPADM

## 2022-02-24 RX ORDER — HYDROCORTISONE 25 MG/G
CREAM TOPICAL 3 TIMES DAILY PRN
Status: DISCONTINUED | OUTPATIENT
Start: 2022-02-24 | End: 2022-02-25 | Stop reason: HOSPADM

## 2022-02-24 RX ORDER — SIMETHICONE 80 MG
1 TABLET,CHEWABLE ORAL EVERY 6 HOURS PRN
Status: DISCONTINUED | OUTPATIENT
Start: 2022-02-24 | End: 2022-02-25 | Stop reason: HOSPADM

## 2022-02-24 RX ORDER — PANTOPRAZOLE SODIUM 40 MG/1
40 TABLET, DELAYED RELEASE ORAL
Status: DISCONTINUED | OUTPATIENT
Start: 2022-02-24 | End: 2022-02-25 | Stop reason: HOSPADM

## 2022-02-24 RX ORDER — PRENATAL WITH FERROUS FUM AND FOLIC ACID 3080; 920; 120; 400; 22; 1.84; 3; 20; 10; 1; 12; 200; 27; 25; 2 [IU]/1; [IU]/1; MG/1; [IU]/1; MG/1; MG/1; MG/1; MG/1; MG/1; MG/1; UG/1; MG/1; MG/1; MG/1; MG/1
1 TABLET ORAL DAILY
Status: DISCONTINUED | OUTPATIENT
Start: 2022-02-24 | End: 2022-02-25 | Stop reason: HOSPADM

## 2022-02-24 RX ORDER — ONDANSETRON 4 MG/1
8 TABLET, ORALLY DISINTEGRATING ORAL EVERY 8 HOURS PRN
Status: DISCONTINUED | OUTPATIENT
Start: 2022-02-24 | End: 2022-02-25 | Stop reason: HOSPADM

## 2022-02-24 RX ORDER — OXYCODONE AND ACETAMINOPHEN 10; 325 MG/1; MG/1
1 TABLET ORAL EVERY 4 HOURS PRN
Status: DISCONTINUED | OUTPATIENT
Start: 2022-02-24 | End: 2022-02-25 | Stop reason: HOSPADM

## 2022-02-24 RX ADMIN — PRENATAL VIT W/ FE FUMARATE-FA TAB 27-0.8 MG 1 TABLET: 27-0.8 TAB at 09:02

## 2022-02-24 RX ADMIN — IBUPROFEN 600 MG: 400 TABLET ORAL at 02:02

## 2022-02-24 RX ADMIN — IBUPROFEN 600 MG: 400 TABLET ORAL at 07:02

## 2022-02-24 RX ADMIN — Medication: at 07:02

## 2022-02-24 RX ADMIN — IBUPROFEN 600 MG: 400 TABLET ORAL at 01:02

## 2022-02-24 RX ADMIN — DOCUSATE SODIUM 200 MG: 100 CAPSULE, LIQUID FILLED ORAL at 07:02

## 2022-02-24 RX ADMIN — PANTOPRAZOLE SODIUM 40 MG: 40 TABLET, DELAYED RELEASE ORAL at 05:02

## 2022-02-24 RX ADMIN — Medication: at 08:02

## 2022-02-24 RX ADMIN — IBUPROFEN 600 MG: 400 TABLET ORAL at 09:02

## 2022-02-24 NOTE — ASSESSMENT & PLAN NOTE
IUP at 40 weeks and 3 days gestational age  Induction of labor- currently on Pitocin-progressing well   AROM-clear  GBS negative  Rh positive  Comfortable with epidural in place

## 2022-02-24 NOTE — SUBJECTIVE & OBJECTIVE
Interval History:  Postpartum day 1.-status post -no complaints    She is doing well this morning. She is tolerating a regular diet without nausea or vomiting. She is voiding spontaneously. She is ambulating. She has passed flatus, and has a BM. Vaginal bleeding is mild. She denies fever or chills. Abdominal pain is mild and controlled with oral medications.   Objective:     Vital Signs (Most Recent):  Temp: 98.4 °F (36.9 °C) (22 1125)  Pulse: 84 (22 112)  Resp: 17 (22)  BP: 120/76 (22)  SpO2: 98 % (22) Vital Signs (24h Range):  Temp:  [97.6 °F (36.4 °C)-101.4 °F (38.6 °C)] 98.4 °F (36.9 °C)  Pulse:  [] 84  Resp:  [14-18] 17  SpO2:  [95 %-98 %] 98 %  BP: ()/(53-84) 120/76     Weight: 89.8 kg (198 lb)  Body mass index is 31.01 kg/m².      Intake/Output Summary (Last 24 hours) at 2022 1303  Last data filed at 2022 0701  Gross per 24 hour   Intake 700 ml   Output 1400 ml   Net -700 ml         Significant Labs:  Lab Results   Component Value Date    GROUPTRH A POS 2022    HEPBSAG Negative 2021    STREPBCULT negative 2022     Recent Labs   Lab 22  0405   HGB 10.0*   HCT 30.6*       CBC:   Recent Labs   Lab 22  0405   WBC 21.03*   RBC 3.26*   HGB 10.0*   HCT 30.6*      MCV 94   MCH 30.7   MCHC 32.7     I have personallly reviewed all pertinent lab results from the last 24 hours.    Physical Exam  Abdomen soft nontender  Uterus-firm below the umbilicus  Lochia-minimal  Extremities-no calf tenderness

## 2022-02-24 NOTE — NURSING TRANSFER
Nursing Transfer Note      2/24/2022     Reason patient is being transferred: Delivery of infant  Transfer to  from L& D  Transfer via wheelchair      Transfer with belongings    Transported by Michelle [facility]LMSTEAD   Medicines sent: dermoplast  Any special needs or follow-up needed: none  Chart send with patient: Yes  Notified: L.English [facility]LMSTEAD  Patient reassessed at: 2/24/22 @ 0100    Upon arrival to floor:Stable

## 2022-02-24 NOTE — PROGRESS NOTES
OB Screen completed and no needs identified at this time.  White board in room updated with contact information, and mother was encouraged to contact office if further needs arise.     22 1041   OB SCREEN   Assessment Type Discharge Planning Assessment   Source of Information patient;health record   Received Prenatal Care Yes   Any indications/suspicions for None   Is this a teen pregnancy No   Is the baby in NICU No   Indication for adoption/Safe Haven No   Indication for DME/post-acute needs No   HIV (+) No   Any congenital  disorders No   Fetal demise/ death No

## 2022-02-24 NOTE — PLAN OF CARE
VSS.  Pt oriented to unit, staff and call light.  Plan of care reviewed with good understanding.  Afebrile at this time.  Fundus firm with light bleeding.  Safety reviewed with good understanding.  PRN medication given for pain with good relief.

## 2022-02-24 NOTE — LACTATION NOTE
This note was copied from a baby's chart.     02/24/22 1100   Maternal Assessment   Breast Size Issue none   Breast Shape round   Breast Density soft   Areola elastic   Nipples everted   Maternal Infant Feeding   Maternal Emotional State independent;relaxed   Infant Positioning clutch/football   Signs of Milk Transfer audible swallow   Pain with Feeding no   Latch Assistance no     Mom breastfeeding now. Good nutritive sucking & swallowing noted. Instructed on the signs of an effective feeding.  Discussed positioning, comfortable latch, rhythmic, nutritive sucking, audible swallows, appropriate length of feeding, comfort of latch and evaluating for fullness cues.  Also discussed appropriate output for age.  Pt states understanding and verbalized appropriate recall.  Discussed feeding cues & feeding frequency 8 or  more times in 24 hours.Encouraged lots of skin to skin with baby. Instructed to never to delay or limit feedings. Assistance offered prn. Mom verbalized understanding

## 2022-02-24 NOTE — PROGRESS NOTES
[facility]  Obstetrics  Labor Progress Note    Patient Name: Annalise Nava  MRN: [id number]  Admission Date: 2022  Hospital Length of Stay: 1 days  Attending Physician: Toby Chavez MD  Primary Care Provider: Ene Rene MD    Subjective:     Principal Problem:Encounter for induction of labor    Hospital Course:  31-year-old  2 para 0010 at 40 weeks and 3 days gestational age admitted for induction of labor      Interval History:  Annalise is a 31 y.o.  at 40w3d. She is doing well.  No complaints, comfortable with epidural, feeling some pelvic pressure    Objective:     Vital Signs (Most Recent):  Temp: 98.3 °F (36.8 °C) (22)  Pulse: 76 (22)  Resp: 16 (22)  BP: (!) 116/59 (22)   Vital Signs (24h Range):  Temp:  [97.9 °F (36.6 °C)-98.3 °F (36.8 °C)] 98.3 °F (36.8 °C)  Pulse:  [] 76  Resp:  [14-18] 16  BP: ()/(50-82) 116/59     Weight: 89.8 kg (198 lb)  Body mass index is 31.01 kg/m².    FHT:  Baseline 140s and reactive, occasional mild variables   TOCO:  Q 2-4 minutes    Cervical Exam:  Dilation:  8  Effacement:  90%  Station: -1  Presentation: Vertex     Significant Labs:  Lab Results   Component Value Date    GROUPTRH A POS 2022    HEPBSAG Negative 2021    STREPBCULT negative 2022       I have personallly reviewed all pertinent lab results from the last 24 hours.    Physical Exam    Assessment/Plan:     31 y.o. female  at 40w3d for:    * Encounter for induction of labor  IUP at 40 weeks and 3 days gestational age  Induction of labor- currently on Pitocin-progressing well   AROM-clear  GBS negative  Rh positive  Comfortable with epidural in place          Vinh Chavez MD  Obstetrics  [facility]

## 2022-02-24 NOTE — DISCHARGE INSTRUCTIONS
Breastfeeding Discharge Instructions       [facility] Breastfeeding Support Services [phone]    American Academy of Pediatrics recommends exclusive breastfeeding for the first 6 months of life and continued breastfeeding with the introduction of supplemental foods beyond the first year of life.   The World Health Organization and the American Academy of Pediatrics recommend to delay all bottle and pacifier use until after 4 weeks of age and breastfeeding is well established.  American Academy of Pediatrics does recommend the use of a pacifier at naptime and bedtime, as a SIDS Reduction strategy, for  newborns only after 1 month of age and breastfeeding has been firmly established.   Feed the baby at the earliest sign of hunger or comfort  Hands to mouth, sucking motions  Rooting or searching for something to suck on  Dont wait for crying - it is a not a late sign of hunger; it is a sign of distress    The feedings may be 8-12 times per 24hrs and will not follow a schedule  Alternate the breast you start the feeding with, or start with the breast that feels the fullest  Switch breasts when the baby takes himself off the breast or falls asleep  Keep offering breasts until the baby looks full, no longer gives hunger signs, and stays asleep when placed on his back in the crib  If the baby is sleepy and wont wake for a feeding, put the baby skin-to-skin dressed in a diaper against the mothers bare chest  Sleep near your baby  The baby should be positioned and latched on to the breast correctly  Chest-to-chest, chin in the breast  Babys lips are flipped outward  Babys mouth is stretched open wide like a shout  Babys sucking should feel like tugging to the mother  The baby should be drinking at the breast:  You should hear swallowing or gulping throughout the feeding  You should see milk on the babys lips when he comes off the breast  Your breasts should be softer when the  baby is finished feeding  The baby should look relaxed at the end of feedings  After the 4th day and your milk is in:  The babys poop should turn bright yellow and be loose, watery, and seedy  The baby should have at least 3-4 poops the size of the palm of your hand per day  The baby should have at least 6-8 wet diapers per day  The urine should be light yellow in color  You should drink when you are thirsty and eat a healthy diet when you are    hungry.     Take naps to get the rest you need.   Take medications and/or drink alcohol only with permission of your obstetrician    or the babys pediatrician.  You can also call the [facility],   ([phone]), Monday-Friday, 8am-5pm Central time, to get the most   up-to-date evidence-based information on the use of medications during   pregnancy and breastfeeding.      The baby should be examined by a pediatrician at 3-5 days of age; unless ordered sooner by the pediatrician.  Once your milk comes in, the baby should be back to birth weight no later than 10-14 days of age.    If your having problems with breastfeeding or have any questions regarding breastfeeding- call [facility] Breastfeeding Support services [phone] Monday- Friday 9 am-5 pm    Breastfeeding Resources:    Baby Café: [phone]    La Leche League: 1(616)-4- LA-LECHE    [facility] Baby Café: [email or web address]    [facility] [phone] [email or web address]

## 2022-02-24 NOTE — L&D DELIVERY NOTE
[facility]  Vaginal Delivery   Operative Note    SUMMARY     Normal spontaneous vaginal delivery of live infant, was placed on mothers abdomen for skin to skin and bulb suctioning performed.  Infant delivered position OA over perineum.  Nuchal cord: No.    Spontaneous delivery of placenta and IV pitocin given noting good uterine tone.  2nd degree laceration noted and repaired in normal fashion with Two 0 chromic, rectovaginal exam within normal limits.  Patient tolerated delivery well. Sponge needle and lap counted correctly x2.    Patient had temperature spike of 101 approximately 2 hours prior to delivery, was started on Ancef, diagnosis of chorioamnionitis    Indications: Encounter for induction of labor  Pregnancy complicated by:   Patient Active Problem List   Diagnosis    Irritable bowel syndrome    Essential tremor    Family history of early CAD    Ingrown nail    Paronychia of great toe, right    Encounter for induction of labor    Chorioamnionitis in third trimester     Admitting GA: 40w3d    Viable female infant with Apgar scores of 8/9, weight pending    EBL-200 cc    Delivery Information for Sydnie Nava    Birth information:  YOB: 2024   Time of birth: 10:17 PM   Sex: female   Head Delivery Date/Time:     Delivery type:    Gestational Age: 40w3d    Delivery Providers    Delivering clinician:            Measurements    Weight:   Length:          Apgars    Living status:   Apgars:  1 min.:  5 min.:  10 min.:  15 min.:  20 min.:    Skin color:         Heart rate:         Reflex irritability:         Muscle tone:         Respiratory effort:         Total:                                Interventions/Resuscitation         Cord    No data filed       Placenta    Placenta delivery date/time:   Placenta removal:            Labor Events:       labor:       Labor Onset Date/Time:         Dilation Complete Date/Time:         Start Pushing Date/Time:         Start  Pushing Date/Time:       Rupture Date/Time: 22  0725         Rupture type: ARM (Artificial Rupture)         Fluid Amount:       Fluid Color: Clear               steroids:       Antibiotics given for GBS:       Induction:       Indications for induction:        Augmentation:       Indications for augmentation:       Labor complications:       Additional complications:          Cervical ripening:                     Delivery:      Episiotomy:       Indication for Episiotomy:       Perineal Lacerations:   Repaired:      Periurethral Laceration:   Repaired:     Labial Laceration:   Repaired:     Sulcus Laceration:   Repaired:     Vaginal Laceration:   Repaired:     Cervical Laceration:   Repaired:     Repair suture:       Repair # of packets:       Last Value - EBL - Nursing (mL):       Sum - EBL - Nursing (mL): 0     Last Value - EBL - Anesthesia (mL):      Calculated QBL (mL):       Vaginal Sweep Performed:       Surgicount Correct:         Other providers:            Details (if applicable):  Trial of Labor      Categorization:      Priority:     Indications for :     Incision Type:       Additional  information:  Forceps:    Vacuum:    Breech:    Observed anomalies    Other (Comments):

## 2022-02-24 NOTE — SUBJECTIVE & OBJECTIVE
Interval History:  Annalise is a 31 y.o.  at 40w3d. She is doing well.  No complaints, comfortable with epidural, feeling some pelvic pressure    Objective:     Vital Signs (Most Recent):  Temp: 98.3 °F (36.8 °C) (22)  Pulse: 76 (22)  Resp: 16 (22)  BP: (!) 116/59 (22)   Vital Signs (24h Range):  Temp:  [97.9 °F (36.6 °C)-98.3 °F (36.8 °C)] 98.3 °F (36.8 °C)  Pulse:  [] 76  Resp:  [14-18] 16  BP: ()/(50-82) 116/59     Weight: 89.8 kg (198 lb)  Body mass index is 31.01 kg/m².    FHT:  Baseline 140s and reactive, occasional mild variables   TOCO:  Q 2-4 minutes    Cervical Exam:  Dilation:  8  Effacement:  90%  Station: -1  Presentation: Vertex     Significant Labs:  Lab Results   Component Value Date    GROUPTRH A POS 2022    HEPBSAG Negative 2021    STREPBCULT negative 2022       I have personallly reviewed all pertinent lab results from the last 24 hours.    Physical Exam

## 2022-02-25 ENCOUNTER — PATIENT MESSAGE (OUTPATIENT)
Dept: PEDIATRICS | Facility: CLINIC | Age: 32
End: 2022-02-25
Payer: COMMERCIAL

## 2022-02-25 VITALS
HEIGHT: 67 IN | WEIGHT: 198 LBS | BODY MASS INDEX: 31.08 KG/M2 | SYSTOLIC BLOOD PRESSURE: 145 MMHG | DIASTOLIC BLOOD PRESSURE: 80 MMHG | OXYGEN SATURATION: 95 % | TEMPERATURE: 98 F | HEART RATE: 74 BPM | RESPIRATION RATE: 18 BRPM

## 2022-02-25 PROCEDURE — 90715 TDAP VACCINE 7 YRS/> IM: CPT | Performed by: SPECIALIST

## 2022-02-25 PROCEDURE — 25000003 PHARM REV CODE 250: Performed by: SPECIALIST

## 2022-02-25 PROCEDURE — 63600175 PHARM REV CODE 636 W HCPCS: Performed by: SPECIALIST

## 2022-02-25 PROCEDURE — 90471 IMMUNIZATION ADMIN: CPT | Performed by: SPECIALIST

## 2022-02-25 PROCEDURE — 72200004 HC VAGINAL DELIVERY LEVEL I

## 2022-02-25 RX ORDER — IBUPROFEN 600 MG/1
600 TABLET ORAL EVERY 6 HOURS PRN
Refills: 0
Start: 2022-02-25 | End: 2023-10-25

## 2022-02-25 RX ADMIN — PRENATAL VIT W/ FE FUMARATE-FA TAB 27-0.8 MG 1 TABLET: 27-0.8 TAB at 10:02

## 2022-02-25 RX ADMIN — DOCUSATE SODIUM 200 MG: 100 CAPSULE, LIQUID FILLED ORAL at 10:02

## 2022-02-25 RX ADMIN — IBUPROFEN 600 MG: 400 TABLET ORAL at 06:02

## 2022-02-25 RX ADMIN — OXYCODONE HYDROCHLORIDE AND ACETAMINOPHEN 1 TABLET: 10; 325 TABLET ORAL at 10:02

## 2022-02-25 RX ADMIN — OXYCODONE HYDROCHLORIDE AND ACETAMINOPHEN 1 TABLET: 5; 325 TABLET ORAL at 02:02

## 2022-02-25 RX ADMIN — CLOSTRIDIUM TETANI TOXOID ANTIGEN (FORMALDEHYDE INACTIVATED), CORYNEBACTERIUM DIPHTHERIAE TOXOID ANTIGEN (FORMALDEHYDE INACTIVATED), BORDETELLA PERTUSSIS TOXOID ANTIGEN (GLUTARALDEHYDE INACTIVATED), BORDETELLA PERTUSSIS FILAMENTOUS HEMAGGLUTININ ANTIGEN (FORMALDEHYDE INACTIVATED), BORDETELLA PERTUSSIS PERTACTIN ANTIGEN, AND BORDETELLA PERTUSSIS FIMBRIAE 2/3 ANTIGEN 0.5 ML: 5; 2; 2.5; 5; 3; 5 INJECTION, SUSPENSION INTRAMUSCULAR at 11:02

## 2022-02-25 NOTE — LACTATION NOTE
02/25/22 1350   Maternal Assessment   Breast Density Bilateral:;soft;filling   Areola Bilateral:;elastic   Nipples Bilateral:;everted   Maternal Infant Feeding   Maternal Emotional State assist needed   Infant Positioning clutch/football   Signs of Milk Transfer audible swallow;infant jaw motion present   Pain with Feeding no   Comfort Measures Before/During Feeding infant position adjusted;latch adjusted;maternal position adjusted   Latch Assistance yes     Assisted to latch baby to left breast in football position. Baby latched deeply after several attempts, nursing well with audible swallows. Mother denies pain during feeding. Emphasized importance of deep latch. Reviewed breastfeeding discharge instructions and encouraged to call lactation department for any related questions or concerns. Patient verbalizes understanding of all instructions with good recall.    Instructed on proper latch to facilitate effective breastfeeding.  Discussed recognizing hunger cues, appropriate positioning and wide mouth latch.  Discussed ways to determine an effective latch including:  areola included in latch, rhythmic/nutritive sucking and audible swallowing.  Also discussed soreness/tenderness associated with latch and prevention and treatment.  Pt states understanding and verbalized appropriate recall.

## 2022-02-25 NOTE — ANESTHESIA POSTPROCEDURE EVALUATION
Anesthesia Post Evaluation    Patient: Annalise Nava    Procedure(s) Performed: * No procedures listed *    Final Anesthesia Type: epidural      Patient location during evaluation: labor & delivery  Patient participation: Yes- Able to Participate  Level of consciousness: awake and alert and oriented  Post-procedure vital signs: reviewed and stable  Pain management: adequate  Airway patency: patent    PONV status at discharge: No PONV  Anesthetic complications: no      Cardiovascular status: blood pressure returned to baseline and hemodynamically stable  Respiratory status: spontaneous ventilation, unassisted and room air  Hydration status: euvolemic  Follow-up not needed.  Comments: Patient reports full return of motor and sensory to her lower extremities. No headache or backache. Ambulating without difficulty. No apparent anesthetic complications.          Vitals Value Taken Time   /75 02/24/22 1907   Temp 37 °C (98.6 °F) 02/24/22 1907   Pulse 69 02/24/22 1907   Resp 18 02/24/22 1907   SpO2 95 % 02/24/22 1907         No case tracking events are documented in the log.      Pain/Fransisca Score: Pain Rating Prior to Med Admin: 4 (2/24/2022  2:28 PM)  Pain Rating Post Med Admin: 1 (2/24/2022  4:00 PM)

## 2022-02-25 NOTE — DISCHARGE SUMMARY
[facility]  Obstetrics  Discharge Summary      Patient Name: Annalise Nava  MRN: [id number]  Admission Date: 2022  Hospital Length of Stay: 3 days  Discharge Date and Time:  2022 9:26 AM  Attending Physician: Toby Chavez MD   Discharging Provider: Phil Campa MD   Primary Care Provider: Ene Rene MD    HPI: No notes on file        * No surgery found *     Hospital Course:   31-year-old  2 para 0010 at 40 weeks and 3 days gestational age admitted for induction of labor, patient underwent uncomplicated spontaneous vaginal delivery.           Final Active Diagnoses:    Diagnosis Date Noted POA    PRINCIPAL PROBLEM:  Encounter for induction of labor [Z34.90] 2022 Not Applicable    Chorioamnionitis in third trimester [O41.1230] 2022 No      Problems Resolved During this Admission:        Significant Diagnostic Studies: Labs:   CBC   Recent Labs   Lab 22  0405   WBC 21.03*   HGB 10.0*   HCT 30.6*        Lab Results   Component Value Date    GROUPTRH A POS 2022         Feeding Method: breast    Immunizations     Date Immunization Status Dose Route/Site Given by    22 MMR Incomplete 0.5 mL Subcutaneous/     22 Tdap Incomplete 0.5 mL Intramuscular/           Delivery:    Episiotomy: None   Lacerations: 2nd   Repair suture:     Repair # of packets: 1   Blood loss (ml):       Birth information:  YOB: 2024   Time of birth: 10:17 PM   Sex: female   Delivery type: Vaginal, Spontaneous   Gestational Age: 40w3d    Delivery Clinician:      Other providers:       Additional  information:  Forceps:    Vacuum:    Breech:    Observed anomalies      Living?:           APGARS  One minute Five minutes Ten minutes   Skin color:         Heart rate:         Grimace:         Muscle tone:         Breathing:         Totals: 8  9        Placenta: Delivered:       appearance    Pending Diagnostic Studies:     None           Discharged Condition: good    Disposition: Home or Self Care    Follow Up:    Patient Instructions:   No discharge procedures on file.  Medications:  Current Discharge Medication List      START taking these medications    Details   benzocaine-lanolin (DERMOPLAST) 20-0.5 % Aero Apply topically 4 (four) times daily as needed.      ibuprofen (ADVIL,MOTRIN) 600 MG tablet Take 1 tablet (600 mg total) by mouth every 6 (six) hours as needed (cramping).  Refills: 0      lanolin Crea cream Apply topically as needed for Dry Skin (to nipples).  Refills: 0         STOP taking these medications       multivitamin with minerals tablet Comments:   Reason for Stopping:         pantoprazole (PROTONIX) 40 MG tablet Comments:   Reason for Stopping:               Phil Campa MD  [facility]

## 2022-02-25 NOTE — PROGRESS NOTES
"                                                                                     Postpartum Day 2 Vaginal Delivery    Annalise Nava is doing well without complaints. She denies any problems with pp blues, is ambulating well, is voiding without difficulty, and states bleeding is not heavy. Her pain level is being controlled with pain meds.      OBJECTIVE:     Vital Signs (Most Recent):  /83 (BP Location: Right arm, Patient Position: Sitting)   Pulse 74   Temp 98.4 °F (36.9 °C) (Oral)   Resp 16   Ht 5' 7" (1.702 m)   Wt 89.8 kg (198 lb)   LMP 05/16/2021 (Exact Date)   SpO2 95%   Breastfeeding Yes   BMI 31.01 kg/m²       Vital Signs Range (Last 24H):  Reviewed    I & O (Last 24H):  Intake/Output Summary (Last 24 hours)  No intake or output data in the 24 hours ending 02/25/22 0921      Physical Exam:    Abd: soft non tender, fundus firm   Ext: negative jacky's sign, minimal edema  Lochia: appropriate amount rubra      CBC:   Lab Results   Component Value Date/Time    WBC 21.03 (H) 02/24/2022 04:05 AM    RBC 3.26 (L) 02/24/2022 04:05 AM    HGB 10.0 (L) 02/24/2022 04:05 AM    HCT 30.6 (L) 02/24/2022 04:05 AM     02/24/2022 04:05 AM    MCV 94 02/24/2022 04:05 AM    MCH 30.7 02/24/2022 04:05 AM    MCHC 32.7 02/24/2022 04:05 AM       ABO/Rh  A POS     S/p vag delivery   Patient Active Problem List   Diagnosis    Irritable bowel syndrome    Essential tremor    Family history of early CAD    Ingrown nail    Paronychia of great toe, right    Encounter for induction of labor    Chorioamnionitis in third trimester          D/c home  See discharge orders  Follow up 4-6 weeks  Emergency room precautions  "

## 2022-04-10 RX ORDER — DOXYLAMINE SUCCINATE 25 MG
TABLET ORAL 2 TIMES DAILY
Qty: 92 G | Refills: 1 | Status: SHIPPED | OUTPATIENT
Start: 2022-04-10 | End: 2023-03-21 | Stop reason: ALTCHOICE

## 2022-06-06 ENCOUNTER — OFFICE VISIT (OUTPATIENT)
Dept: PODIATRY | Facility: CLINIC | Age: 32
End: 2022-06-06
Payer: COMMERCIAL

## 2022-06-06 ENCOUNTER — HOSPITAL ENCOUNTER (OUTPATIENT)
Dept: RADIOLOGY | Facility: CLINIC | Age: 32
Discharge: HOME | End: 2022-06-06
Attending: PODIATRIST
Payer: COMMERCIAL

## 2022-06-06 VITALS — HEIGHT: 67 IN | WEIGHT: 198 LBS | BODY MASS INDEX: 31.08 KG/M2 | OXYGEN SATURATION: 99 % | HEART RATE: 71 BPM

## 2022-06-06 DIAGNOSIS — M79.671 RIGHT FOOT PAIN: Primary | ICD-10-CM

## 2022-06-06 PROCEDURE — 99213 PR OFFICE/OUTPT VISIT, EST, LEVL III, 20-29 MIN: ICD-10-PCS | Mod: S$GLB,,, | Performed by: PODIATRIST

## 2022-06-06 PROCEDURE — 73630 XR FOOT COMPLETE 3 VIEW RIGHT: ICD-10-PCS | Mod: RT,S$GLB,, | Performed by: RADIOLOGY

## 2022-06-06 PROCEDURE — 1159F MED LIST DOCD IN RCRD: CPT | Mod: CPTII,S$GLB,, | Performed by: PODIATRIST

## 2022-06-06 PROCEDURE — 1160F PR REVIEW ALL MEDS BY PRESCRIBER/CLIN PHARMACIST DOCUMENTED: ICD-10-PCS | Mod: CPTII,S$GLB,, | Performed by: PODIATRIST

## 2022-06-06 PROCEDURE — 3008F PR BODY MASS INDEX (BMI) DOCUMENTED: ICD-10-PCS | Mod: CPTII,S$GLB,, | Performed by: PODIATRIST

## 2022-06-06 PROCEDURE — 1160F RVW MEDS BY RX/DR IN RCRD: CPT | Mod: CPTII,S$GLB,, | Performed by: PODIATRIST

## 2022-06-06 PROCEDURE — 99213 OFFICE O/P EST LOW 20 MIN: CPT | Mod: S$GLB,,, | Performed by: PODIATRIST

## 2022-06-06 PROCEDURE — 3008F BODY MASS INDEX DOCD: CPT | Mod: CPTII,S$GLB,, | Performed by: PODIATRIST

## 2022-06-06 PROCEDURE — 73630 X-RAY EXAM OF FOOT: CPT | Mod: RT,S$GLB,, | Performed by: RADIOLOGY

## 2022-06-06 PROCEDURE — 1159F PR MEDICATION LIST DOCUMENTED IN MEDICAL RECORD: ICD-10-PCS | Mod: CPTII,S$GLB,, | Performed by: PODIATRIST

## 2022-06-06 RX ORDER — FLUCONAZOLE 200 MG/1
200 TABLET ORAL WEEKLY
Qty: 28 TABLET | Refills: 0 | Status: SHIPPED | OUTPATIENT
Start: 2022-06-06 | End: 2022-12-13

## 2022-06-06 RX ORDER — FLUCONAZOLE 100 MG/1
100 TABLET ORAL 2 TIMES DAILY
COMMUNITY
Start: 2022-05-30 | End: 2023-05-11 | Stop reason: ALTCHOICE

## 2022-06-06 NOTE — PROGRESS NOTES
"  [address] [place] [address]  Phone: [phone]   [phone]    Patient's PCP:Ene Rene MD  Referring Provider: Aaareferral Self    Subjective:      Chief Complaint:: Foot Pain (Right lateral foot)    ALEE Nava is a 31 y.o. female who presents today with a complaint of right lateral foot lasting for 3 months . Onset of symptoms she stated she had a baby about 3 months ago, thought she was just walking differently due to pregnancy. She report her pain waxes and wanes. Aggravating factors applied pressure, walking barefoot. Symptoms are intermittent. Treatment to date different shoe gear, ibuprofen.       Vitals:    06/06/22 0852   Pulse: 71   SpO2: 99%   Weight: 89.8 kg (198 lb)   Height: 5' 7" (1.702 m)   PainSc:   1      Shoe Size: 8.5-9    History reviewed. No pertinent surgical history.  Past Medical History:   Diagnosis Date    Infertility, female     IVF    Irritable bowel syndrome without diarrhea      Family History   Problem Relation Age of Onset    Mitral valve prolapse Sister     Macular degeneration Maternal Aunt     Glaucoma Maternal Grandmother     Retinal detachment Neg Hx         Social History:   Marital Status:   Alcohol History:  reports previous alcohol use.  Tobacco History:  reports that she has never smoked. She has never used smokeless tobacco.  Drug History:  reports no history of drug use.    Review of patient's allergies indicates:  No Known Allergies    Current Outpatient Medications   Medication Sig Dispense Refill    benzocaine-lanolin (DERMOPLAST) 20-0.5 % Aero Apply topically 4 (four) times daily as needed.      fluconazole (DIFLUCAN) 100 MG tablet Take 100 mg by mouth 2 (two) times daily.      ibuprofen (ADVIL,MOTRIN) 600 MG tablet Take 1 tablet (600 mg total) by mouth every 6 (six) hours as needed (cramping).  0    lanolin Crea cream Apply topically as needed for Dry Skin (to nipples).  0    miconazole (MICOTIN) 2 % " cream Apply topically 2 (two) times daily. 92 g 1    oxyCODONE-acetaminophen (PERCOCET) 5-325 mg per tablet TAKE 1 TABLET BY MOUTH EVERY 6 HOURS AS NEEDED FOR PAIN 8 tablet 0     No current facility-administered medications for this visit.       Review of Systems   Constitutional: Negative for chills, fatigue, fever and unexpected weight change.   HENT: Negative for hearing loss and trouble swallowing.    Eyes: Negative for photophobia and visual disturbance.   Respiratory: Negative for cough, shortness of breath and wheezing.    Cardiovascular: Negative for chest pain, palpitations and leg swelling.   Gastrointestinal: Negative for abdominal pain and nausea.   Genitourinary: Negative for dysuria and frequency.   Musculoskeletal: Negative for arthralgias, back pain, gait problem, joint swelling and myalgias.   Skin: Negative for rash and wound.   Neurological: Negative for tremors, seizures, weakness, numbness and headaches.   Hematological: Does not bruise/bleed easily.         Objective:        Physical Exam:   Foot Exam    General  General Appearance: appears stated age and healthy   Orientation: alert and oriented to person, place, and time   Affect: appropriate   Gait: unimpaired       Right Foot/Ankle     Inspection and Palpation  Ecchymosis: none  Tenderness: fifth metatarsal base tenderness   Swelling: fifth metatarsal base   Arch: pes planus  Hammertoes: absent  Claw Toes: absent  Hallux valgus: no  Hallux limitus: yes (Functional hallux limitus with hyper mobile 1st ray.  Hyper extension IPJ 1st toe)  Skin Exam: skin intact;   Neurovascular  Dorsalis pedis: 2+  Posterior tibial: 2+  Capillary Refill: 2+  Saphenous nerve sensation: normal  Tibial nerve sensation: normal  Superficial peroneal nerve sensation: normal  Deep peroneal nerve sensation: normal  Sural nerve sensation: normal    Range of Motion    Normal right ankle ROM  Passive  Ankle inversion: pain  Passive inversion: Mild pain 5th metatarsal  base and peroneus brevis tendon insertion.    Active  Ankle inversion: pain  Right ankle active inversion: Mild pain 5th metatarsal base and peroneus brevis tendon insertion.      Left Foot/Ankle      Inspection and Palpation  Ecchymosis: none  Tenderness: none   Swelling: none   Arch: pes planus  Hammertoes: absent  Claw toes: absent  Hallux valgus: no  Hallux limitus: yes (Functional hallux limitus with hyper mobile 1st ray.  Hyper extension IPJ 1st toe)  Skin Exam: skin intact;   Fungus Toenails: present (First toenail with slight yellow discoloration slightly detached from nail bed with no signs of infection no pain)    Neurovascular  Dorsalis pedis: 2+  Posterior tibial: 2+  Capillary refill: 2+  Saphenous nerve sensation: normal  Tibial nerve sensation: normal  Superficial peroneal nerve sensation: normal  Deep peroneal nerve sensation: normal  Sural nerve sensation: normal          Physical Exam  Cardiovascular:      Pulses:           Dorsalis pedis pulses are 2+ on the right side and 2+ on the left side.        Posterior tibial pulses are 2+ on the right side and 2+ on the left side.   Musculoskeletal:      Right foot: No bunion.      Left foot: No bunion.        Feet:    Feet:      Left foot:      Toenail Condition: Fungal disease (First toenail with slight yellow discoloration slightly detached from nail bed with no signs of infection no pain) present.              Right Ankle/Foot Exam     Swelling   The patient is swollen on the fifth metatarsal base.    Range of Motion   The patient has normal right ankle ROM.        Vascular Exam     Right Pulses  Dorsalis Pedis:      2+  Posterior Tibial:      2+        Left Pulses  Dorsalis Pedis:      2+  Posterior Tibial:      2+             Imaging:   AP, lateral, lateral oblique weight-bearing x-rays right foot:  No acute fractures, no bone tumors, no soft tissue masses seen.  Small posterior calcific Achilles tendon insertion.  No pathology seen at the 5th  metatarsal base.  Elevated 1st ray with functional hallux limitus.  Slightly hyper extended IP joint 1st toe.         Assessment:       1. Right foot pain      Plan:   Right foot pain    1. Evaluated patient she apparently has some peroneus brevis tendon insertion tendinitis on the right foot.  Also explained that she has a hyper mobile 1st ray which sometimes caused her to shift her weight the lateral side of the foot you the functional hallux limitus.  Bone to try to accommodate her feet by using cross trainers by [employer] along with her running shoes.  She will ice the area take over-the-counter anti-inflammatories.  If she is not greatly improved in 4 weeks she will call for ankle brace and possible physical therapy.  If cortisone injection is indicated she will call me and I will consider cortisone injection along with immobilization physical therapy.  If she is doing well over the next 4 weeks she will not return to see me for this condition.    Treatment of tendonitis with rest, ice, oral NSAID, topical antiinflammatory creams, cam walker boot if needed, and MRI if needed.    2. I evaluated the dystrophic 1st toenail left foot and we both agree that this use either traumatic in origin or may be onychomycosis.  Years ago she did have Lamisil orally which seem to resolve it but it recurred.  We discussed the fact this is very hard to retic a completely since she is breast-feeding with the new baby and she is already taking Diflucan we decided do Diflucan 1 pill weekly for 28 weeks.    Fungal infection of toenails explained. Treatment options including no treatment, periodic debridement, topical medications, oral medications, and removal of the nail were discussed, as well as success rates and risks of recurrence. We agreed on oral medication, will order the necessary lab work Diflucan 1 pill weekly for 28 weeks.  No lab tests required.      Return as needed    Procedures          Counseling:     I provided  patient education verbally regarding:   Patient diagnosis, treatment options, as well as alternatives, risks, and benefits.     I explained to the pt the 4 stages of osteoarthritic changes of the 1st MPJ starting with functional loss of range of motion and eventual destruction of the cartilage causing increased deformity, pain and loss of motion.  I discuss the treatment of the early stages 1 and 2 with shoe modification, NSAIDs, possible cortisone shots and CMOs.  I told her most stage 3 and 4 if they become painful enough may require surgical intervention.  I discussed joint preservation procedure with partial relief of pain and some increase in function and possible second surgery in the future if pain and arthritis become worse.  I discussed joint destruction procedures of fusion o 1st MPJ, Ren bunionectomy and possible joint implant if the pt. meets my criteria of older age and sedentary life style.  This note was created using Dragon voice recognition software that occasionally misinterpreted phrases or words.

## 2022-09-14 ENCOUNTER — OFFICE VISIT (OUTPATIENT)
Dept: OPTOMETRY | Facility: CLINIC | Age: 32
End: 2022-09-14
Payer: COMMERCIAL

## 2022-09-14 ENCOUNTER — PATIENT MESSAGE (OUTPATIENT)
Dept: OPTOMETRY | Facility: CLINIC | Age: 32
End: 2022-09-14

## 2022-09-14 DIAGNOSIS — H52.7 REFRACTIVE ERROR: ICD-10-CM

## 2022-09-14 DIAGNOSIS — Z46.0 CONTACT LENS/GLASSES FITTING: Primary | ICD-10-CM

## 2022-09-14 DIAGNOSIS — Z01.00 EXAMINATION OF EYES AND VISION: Primary | ICD-10-CM

## 2022-09-14 DIAGNOSIS — H04.123 DRY EYE SYNDROME, BILATERAL: ICD-10-CM

## 2022-09-14 DIAGNOSIS — H53.149 ACCOMMODATIVE ASTHENOPIA: ICD-10-CM

## 2022-09-14 PROCEDURE — 99999 PR PBB SHADOW E&M-EST. PATIENT-LVL III: CPT | Mod: PBBFAC,,, | Performed by: OPTOMETRIST

## 2022-09-14 PROCEDURE — 99999 PR PBB SHADOW E&M-EST. PATIENT-LVL II: ICD-10-PCS | Mod: PBBFAC,,, | Performed by: OPTOMETRIST

## 2022-09-14 PROCEDURE — 99499 UNLISTED E&M SERVICE: CPT | Mod: S$GLB,,, | Performed by: OPTOMETRIST

## 2022-09-14 PROCEDURE — 92015 DETERMINE REFRACTIVE STATE: CPT | Mod: S$GLB,,, | Performed by: OPTOMETRIST

## 2022-09-14 PROCEDURE — 92310 PR CONTACT LENS FITTING (NO CHANGE): ICD-10-PCS | Mod: S$GLB,,, | Performed by: OPTOMETRIST

## 2022-09-14 PROCEDURE — 92004 PR EYE EXAM, NEW PATIENT,COMPREHESV: ICD-10-PCS | Mod: S$GLB,,, | Performed by: OPTOMETRIST

## 2022-09-14 PROCEDURE — 99999 PR PBB SHADOW E&M-EST. PATIENT-LVL III: ICD-10-PCS | Mod: PBBFAC,,, | Performed by: OPTOMETRIST

## 2022-09-14 PROCEDURE — 99999 PR PBB SHADOW E&M-EST. PATIENT-LVL II: CPT | Mod: PBBFAC,,, | Performed by: OPTOMETRIST

## 2022-09-14 PROCEDURE — 92004 COMPRE OPH EXAM NEW PT 1/>: CPT | Mod: S$GLB,,, | Performed by: OPTOMETRIST

## 2022-09-14 PROCEDURE — 99499 NO LOS: ICD-10-PCS | Mod: S$GLB,,, | Performed by: OPTOMETRIST

## 2022-09-14 PROCEDURE — 92310 CONTACT LENS FITTING OU: CPT | Mod: S$GLB,,, | Performed by: OPTOMETRIST

## 2022-09-14 PROCEDURE — 92015 PR REFRACTION: ICD-10-PCS | Mod: S$GLB,,, | Performed by: OPTOMETRIST

## 2022-09-14 RX ORDER — ERGOCALCIFEROL 1.25 MG/1
50000 CAPSULE ORAL
COMMUNITY
End: 2023-06-12

## 2022-09-14 NOTE — PROGRESS NOTES
HPI    Pt here today for annual exam with contacts.   States no visual changes or   complaints.   Needs updated specs & clrx.  Happy with current contacts -- wears Acuvue 1 day Moist usually just   socially.  Denies any headaches or eye pain.    (-) allergies / dry eyes  (-) gtts  (-) floaters or light flashes  Last edited by Anabel Brennan on 9/14/2022  8:38 AM.            Assessment /Plan     For exam results, see Encounter Report.    Examination of eyes and vision    Accommodative asthenopia    Refractive error    Dry eye syndrome, bilateral    1. Examination of eyes and vision    2. Accommodative asthenopia  3. Refractive error  Dispensed updated spectacle Rx. Discussed various spectacle lens options. Discussed adaptation period to new specs, OD >> OS.   Discussed cls options - pt happy with current brand, seldomly wears  Dispensed updated CLs Rx: acuvue 1-day moist. Discussed proper hand hygiene and wear/care of lenses. Daily wear only, dispose of daily. Do not sleep/swim/shower in lenses. Discontinue CL wear ASAP and RTC if any redness or discomfort occurs.     4. Dry eye syndrome, bilateral  Discussed ocular affects of dry eyes. Recommend OTC artificial tears 2-4 times a day in both eyes. Discussed chronicity of CATIE. RTC if symptoms not alleviated by continued use of artificial tears.         RTC in 1 year for comprehensive eye exam, or sooner prn.

## 2022-09-14 NOTE — PROGRESS NOTES
Assessment /Plan     For exam results, see Encounter Report.    Contact lens/glasses fitting      Patient is here for a comprehensive eye exam and contact lens fit. See other exam visit with same encounter date 09/14/2022 for detailed exam information.

## 2022-09-15 ENCOUNTER — PATIENT MESSAGE (OUTPATIENT)
Dept: OPTOMETRY | Facility: CLINIC | Age: 32
End: 2022-09-15
Payer: COMMERCIAL

## 2023-02-18 RX ORDER — AZITHROMYCIN 250 MG/1
TABLET, FILM COATED ORAL
Qty: 6 TABLET | Refills: 0 | Status: SHIPPED | OUTPATIENT
Start: 2023-02-18 | End: 2023-03-21 | Stop reason: ALTCHOICE

## 2023-02-18 RX ORDER — PREDNISONE 20 MG/1
40 TABLET ORAL DAILY
Qty: 14 TABLET | Refills: 0 | Status: SHIPPED | OUTPATIENT
Start: 2023-02-18 | End: 2023-02-25

## 2023-03-13 RX ORDER — ONDANSETRON 4 MG/1
4 TABLET, FILM COATED ORAL EVERY 6 HOURS PRN
Qty: 30 TABLET | Refills: 1 | Status: ON HOLD | OUTPATIENT
Start: 2023-03-13 | End: 2024-03-15

## 2023-03-21 ENCOUNTER — OFFICE VISIT (OUTPATIENT)
Dept: DERMATOLOGY | Facility: CLINIC | Age: 33
End: 2023-03-21
Payer: COMMERCIAL

## 2023-03-21 VITALS — WEIGHT: 155 LBS | BODY MASS INDEX: 24.33 KG/M2 | HEIGHT: 67 IN

## 2023-03-21 DIAGNOSIS — D48.5 NEOPLASM OF UNCERTAIN BEHAVIOR OF SKIN: Primary | ICD-10-CM

## 2023-03-21 DIAGNOSIS — A63.0 CONDYLOMA: ICD-10-CM

## 2023-03-21 PROCEDURE — 88305 TISSUE EXAM BY PATHOLOGIST: ICD-10-PCS | Mod: 26,,, | Performed by: PATHOLOGY

## 2023-03-21 PROCEDURE — 88364 INSITU HYBRIDIZATION (FISH): CPT | Performed by: PATHOLOGY

## 2023-03-21 PROCEDURE — 88305 TISSUE EXAM BY PATHOLOGIST: CPT | Performed by: PATHOLOGY

## 2023-03-21 PROCEDURE — 56515 PR DESTRUCTION,LESION(S),VULVA;EXTENSIVE: ICD-10-PCS | Mod: S$GLB,,, | Performed by: DERMATOLOGY

## 2023-03-21 PROCEDURE — 1160F PR REVIEW ALL MEDS BY PRESCRIBER/CLIN PHARMACIST DOCUMENTED: ICD-10-PCS | Mod: CPTII,S$GLB,, | Performed by: DERMATOLOGY

## 2023-03-21 PROCEDURE — 88305 TISSUE EXAM BY PATHOLOGIST: CPT | Mod: 26,,, | Performed by: PATHOLOGY

## 2023-03-21 PROCEDURE — 88342 IMHCHEM/IMCYTCHM 1ST ANTB: CPT | Performed by: PATHOLOGY

## 2023-03-21 PROCEDURE — 11102 PR TANGENTIAL BIOPSY, SKIN, SINGLE LESION: ICD-10-PCS | Mod: XS,S$GLB,, | Performed by: DERMATOLOGY

## 2023-03-21 PROCEDURE — 1159F MED LIST DOCD IN RCRD: CPT | Mod: CPTII,S$GLB,, | Performed by: DERMATOLOGY

## 2023-03-21 PROCEDURE — 88342 IMHCHEM/IMCYTCHM 1ST ANTB: CPT | Mod: 26,,, | Performed by: PATHOLOGY

## 2023-03-21 PROCEDURE — 1160F RVW MEDS BY RX/DR IN RCRD: CPT | Mod: CPTII,S$GLB,, | Performed by: DERMATOLOGY

## 2023-03-21 PROCEDURE — 88365 INSITU HYBRIDIZATION (FISH): CPT | Performed by: PATHOLOGY

## 2023-03-21 PROCEDURE — 3008F BODY MASS INDEX DOCD: CPT | Mod: CPTII,S$GLB,, | Performed by: DERMATOLOGY

## 2023-03-21 PROCEDURE — 99999 PR PBB SHADOW E&M-EST. PATIENT-LVL III: CPT | Mod: PBBFAC,,, | Performed by: DERMATOLOGY

## 2023-03-21 PROCEDURE — 99203 PR OFFICE/OUTPT VISIT, NEW, LEVL III, 30-44 MIN: ICD-10-PCS | Mod: 25,S$GLB,, | Performed by: DERMATOLOGY

## 2023-03-21 PROCEDURE — 56515 DESTROY VULVA LESION/S COMPL: CPT | Mod: S$GLB,,, | Performed by: DERMATOLOGY

## 2023-03-21 PROCEDURE — 11102 TANGNTL BX SKIN SINGLE LES: CPT | Mod: XS,S$GLB,, | Performed by: DERMATOLOGY

## 2023-03-21 PROCEDURE — 99203 OFFICE O/P NEW LOW 30 MIN: CPT | Mod: 25,S$GLB,, | Performed by: DERMATOLOGY

## 2023-03-21 PROCEDURE — 1159F PR MEDICATION LIST DOCUMENTED IN MEDICAL RECORD: ICD-10-PCS | Mod: CPTII,S$GLB,, | Performed by: DERMATOLOGY

## 2023-03-21 PROCEDURE — 3008F PR BODY MASS INDEX (BMI) DOCUMENTED: ICD-10-PCS | Mod: CPTII,S$GLB,, | Performed by: DERMATOLOGY

## 2023-03-21 PROCEDURE — 88342 CHG IMMUNOCYTOCHEMISTRY: ICD-10-PCS | Mod: 26,,, | Performed by: PATHOLOGY

## 2023-03-21 PROCEDURE — 99999 PR PBB SHADOW E&M-EST. PATIENT-LVL III: ICD-10-PCS | Mod: PBBFAC,,, | Performed by: DERMATOLOGY

## 2023-03-21 RX ORDER — IMIQUIMOD 12.5 MG/.25G
CREAM TOPICAL
Qty: 24 PACKET | Refills: 3 | Status: ON HOLD | OUTPATIENT
Start: 2023-03-22 | End: 2024-03-15

## 2023-03-21 NOTE — PROGRESS NOTES
Subjective:       Patient ID:  Annalise Nava is a 32 y.o. female who presents for   Chief Complaint   Patient presents with    Skin Tags     Pelvic region     New Patient    C/o skin tags in the pelvic region  Multiplying since having a baby, getting very irritated, bleeds  Started initially after waxing - although the area in question was not waxed  Previous derm did biopsy thought was HPV - came back negative    Derm Hx:  Denies phx NMSC/MM  Denies fhx MM        Review of Systems   Constitutional:  Negative for fever, chills and fatigue.   Respiratory:  Negative for cough and shortness of breath.    Skin:  Positive for daily sunscreen use and activity-related sunscreen use.      Objective:    Physical Exam   Constitutional: She appears well-developed and well-nourished.   Neurological: She is alert and oriented to person, place, and time.   Psychiatric: She has a normal mood and affect.   Skin:   Areas Examined (abnormalities noted in diagram):   Genitals / Buttocks / Groin Inspection Performed            Diagram Legend     Erythematous scaling macule/papule c/w actinic keratosis       Vascular papule c/w angioma      Pigmented verrucoid papule/plaque c/w seborrheic keratosis      Yellow umbilicated papule c/w sebaceous hyperplasia      Irregularly shaped tan macule c/w lentigo     1-2 mm smooth white papules consistent with Milia      Movable subcutaneous cyst with punctum c/w epidermal inclusion cyst      Subcutaneous movable cyst c/w pilar cyst      Firm pink to brown papule c/w dermatofibroma      Pedunculated fleshy papule(s) c/w skin tag(s)      Evenly pigmented macule c/w junctional nevus     Mildly variegated pigmented, slightly irregular-bordered macule c/w mildly atypical nevus      Flesh colored to evenly pigmented papule c/w intradermal nevus       Pink pearly papule/plaque c/w basal cell carcinoma      Erythematous hyperkeratotic cursted plaque c/w SCC      Surgical scar with no sign of skin  cancer recurrence      Open and closed comedones      Inflammatory papules and pustules      Verrucoid papule consistent consistent with wart     Erythematous eczematous patches and plaques     Dystrophic onycholytic nail with subungual debris c/w onychomycosis     Umbilicated papule    Erythematous-base heme-crusted tan verrucoid plaque consistent with inflamed seborrheic keratosis     Erythematous Silvery Scaling Plaque c/w Psoriasis     See annotation            Assessment / Plan:      Pathology Orders:       Normal Orders This Visit    Specimen to Pathology, Dermatology     Comments:    Number of Specimens:->1  ------------------------->-------------------------  Spec 1 Procedure:->Biopsy  Spec 1 Clinical Impression:->Cluster or verrucoid sessile  papules, favor condyloma, test for HPV high risk if possible  Spec 1 Source:->mons pubis    Questions:    Procedure Type: Dermatology and skin neoplasms    Number of Specimens: 1    ------------------------: -------------------------    Spec 1 Procedure: Biopsy    Spec 1 Clinical Impression: Cluster or verrucoid sessile papules, favor condyloma, test for HPV high risk if possible    Spec 1 Source: mons pubis    Release to patient:           Neoplasm of uncertain behavior of skin  -     Specimen to Pathology, Dermatology  Shave biopsy procedure note:    Shave biopsy performed after verbal consent including risk of infection, scar, recurrence, need for additional treatment of site. Area prepped with alcohol, anesthetized with approximately 1.0cc of 1% lidocaine with epinephrine. Lesional tissue shaved with razor blade. Hemostasis achieved with application of aluminum chloride followed by hyfrecation. No complications. Dressing applied. Wound care explained.    Condyloma  -     imiquimod (ALDARA) 5 % cream; Apply topically 3 (three) times a week.  Dispense: 24 packet; Refill: 3  Start applying 2 weeks post cryo           No follow-ups on file.

## 2023-03-21 NOTE — PATIENT INSTRUCTIONS

## 2023-03-23 ENCOUNTER — PATIENT MESSAGE (OUTPATIENT)
Dept: DERMATOLOGY | Facility: CLINIC | Age: 33
End: 2023-03-23
Payer: COMMERCIAL

## 2023-04-17 LAB
FINAL PATHOLOGIC DIAGNOSIS: NORMAL
GROSS: NORMAL
Lab: NORMAL
MICROSCOPIC EXAM: NORMAL
SUPPLEMENTAL DIAGNOSIS: NORMAL

## 2023-04-19 ENCOUNTER — PATIENT MESSAGE (OUTPATIENT)
Dept: DERMATOLOGY | Facility: CLINIC | Age: 33
End: 2023-04-19
Payer: COMMERCIAL

## 2023-04-28 ENCOUNTER — PATIENT MESSAGE (OUTPATIENT)
Dept: OPTOMETRY | Facility: CLINIC | Age: 33
End: 2023-04-28
Payer: COMMERCIAL

## 2023-05-01 ENCOUNTER — PATIENT MESSAGE (OUTPATIENT)
Dept: DERMATOLOGY | Facility: CLINIC | Age: 33
End: 2023-05-01
Payer: COMMERCIAL

## 2023-05-11 ENCOUNTER — OFFICE VISIT (OUTPATIENT)
Dept: DERMATOLOGY | Facility: CLINIC | Age: 33
End: 2023-05-11
Payer: COMMERCIAL

## 2023-05-11 VITALS — HEIGHT: 67 IN | WEIGHT: 155 LBS | BODY MASS INDEX: 24.33 KG/M2

## 2023-05-11 DIAGNOSIS — A63.0 CONDYLOMA: Primary | ICD-10-CM

## 2023-05-11 LAB
HBV SURFACE AG SERPL QL IA: NEGATIVE
HCV AB SERPL QL IA: NEGATIVE
HCV AB SERPL QL IA: NEGATIVE
HIV 1+2 AB+HIV1 P24 AG SERPL QL IA: NON REACTIVE
RPR: NON REACTIVE
RPR: NON REACTIVE

## 2023-05-11 PROCEDURE — 99499 NO LOS: ICD-10-PCS | Mod: S$GLB,,, | Performed by: DERMATOLOGY

## 2023-05-11 PROCEDURE — 99499 UNLISTED E&M SERVICE: CPT | Mod: S$GLB,,, | Performed by: DERMATOLOGY

## 2023-05-11 PROCEDURE — 99999 PR PBB SHADOW E&M-EST. PATIENT-LVL II: ICD-10-PCS | Mod: PBBFAC,,, | Performed by: DERMATOLOGY

## 2023-05-11 PROCEDURE — 56515 PR DESTRUCTION,LESION(S),VULVA;EXTENSIVE: ICD-10-PCS | Mod: S$GLB,,, | Performed by: DERMATOLOGY

## 2023-05-11 PROCEDURE — 99999 PR PBB SHADOW E&M-EST. PATIENT-LVL II: CPT | Mod: PBBFAC,,, | Performed by: DERMATOLOGY

## 2023-05-11 PROCEDURE — 56515 DESTROY VULVA LESION/S COMPL: CPT | Mod: S$GLB,,, | Performed by: DERMATOLOGY

## 2023-05-11 NOTE — PROGRESS NOTES
Subjective:      Patient ID:  Annalise Nava is a 32 y.o. female who presents for   Chief Complaint   Patient presents with    Follow-up     LOV 3/21/23 NUB, Condyloma    Skin, mons pubis, shave biopsy:   - FEATURES MOST CONSISTENT WITH SEBORRHEIC KERATOSIS-LIKE CONDYLOMA ACUMINATUM.   This lesion is benign.     Patient here to F/U lesions to pubis  Lesion improved after cryo and excision  No change using Aldara.    Derm Hx:  No Phx NMSC  No Fhx MM    Current Outpatient Medications:   ·  ergocalciferol (ERGOCALCIFEROL) 50,000 unit Cap, Take 50,000 Units by mouth every 7 days., Disp: , Rfl:   ·  imiquimod (ALDARA) 5 % cream, Apply topically 3 (three) times a week., Disp: 24 packet, Rfl: 3  ·  multivitamin capsule, Take 1 capsule by mouth once daily., Disp: , Rfl:   ·  ibuprofen (ADVIL,MOTRIN) 600 MG tablet, Take 1 tablet (600 mg total) by mouth every 6 (six) hours as needed (cramping). (Patient not taking: Reported on 9/14/2022), Disp: , Rfl: 0  ·  ondansetron (ZOFRAN) 4 MG tablet, Take 1 tablet (4 mg total) by mouth every 6 (six) hours as needed for Nausea. (Patient not taking: Reported on 3/21/2023), Disp: 30 tablet, Rfl: 1     Review of Systems   Constitutional:  Negative for fever, chills and fatigue.   Respiratory:  Negative for cough and shortness of breath.    Skin:  Positive for daily sunscreen use and activity-related sunscreen use.     Objective:   Physical Exam   Constitutional: She appears well-developed and well-nourished.   Neurological: She is alert and oriented to person, place, and time.   Psychiatric: She has a normal mood and affect.      Diagram Legend     Erythematous scaling macule/papule c/w actinic keratosis       Vascular papule c/w angioma      Pigmented verrucoid papule/plaque c/w seborrheic keratosis      Yellow umbilicated papule c/w sebaceous hyperplasia      Irregularly shaped tan macule c/w lentigo     1-2 mm smooth white papules consistent with Milia      Movable subcutaneous  cyst with punctum c/w epidermal inclusion cyst      Subcutaneous movable cyst c/w pilar cyst      Firm pink to brown papule c/w dermatofibroma      Pedunculated fleshy papule(s) c/w skin tag(s)      Evenly pigmented macule c/w junctional nevus     Mildly variegated pigmented, slightly irregular-bordered macule c/w mildly atypical nevus      Flesh colored to evenly pigmented papule c/w intradermal nevus       Pink pearly papule/plaque c/w basal cell carcinoma      Erythematous hyperkeratotic cursted plaque c/w SCC      Surgical scar with no sign of skin cancer recurrence      Open and closed comedones      Inflammatory papules and pustules      Verrucoid papule consistent consistent with wart     Erythematous eczematous patches and plaques     Dystrophic onycholytic nail with subungual debris c/w onychomycosis     Umbilicated papule    Erythematous-base heme-crusted tan verrucoid plaque consistent with inflamed seborrheic keratosis     Erythematous Silvery Scaling Plaque c/w Psoriasis     See annotation      Assessment / Plan:        Condyloma    Cryosurgery procedure note:    Verbal consent from the patient is obtained. Liquid nitrogen cryosurgery is applied to 20 lesions to produce a freeze injury- 3 freeze-thaw cycles. The patient is aware that blisters may form and is instructed on wound care with gentle cleansing and use of vaseline ointment to keep moist until healed. The patient is supplied a handout on cryosurgery and is instructed to call if lesions do not completely resolve.    Resume imiquimod 3 times weekly in approximately 2 weeks (once cryo wounds healed)         No follow-ups on file.

## 2023-06-01 ENCOUNTER — SPECIALTY PHARMACY (OUTPATIENT)
Dept: PHARMACY | Facility: CLINIC | Age: 33
End: 2023-06-01
Payer: COMMERCIAL

## 2023-06-01 ENCOUNTER — TELEPHONE (OUTPATIENT)
Dept: PHARMACY | Facility: CLINIC | Age: 33
End: 2023-06-01
Payer: COMMERCIAL

## 2023-06-01 DIAGNOSIS — N97.9 INFERTILITY, FEMALE: Primary | ICD-10-CM

## 2023-06-01 NOTE — TELEPHONE ENCOUNTER
Hello, this is Kelly Mock, clinical pharmacist with [facility] that is part of your care team.  We have begun working on your prescription that your doctor has sent us. Our next steps include:     Working with your insurance company to obtain approval for your medication  Working with you to ensure your medication is affordable     We will be calling you along the way with updates on your medication but if you have any concerns or receive information that you would like to discuss please reach us at [phone].    Welcome call outcome: Patient/caregiver reached

## 2023-06-12 RX ORDER — DOXYCYCLINE 100 MG/1
100 CAPSULE ORAL 2 TIMES DAILY
Status: ON HOLD | COMMUNITY
Start: 2023-06-01 | End: 2024-03-14

## 2023-06-12 RX ORDER — ESTRADIOL 2 MG/1
1 TABLET ORAL 2 TIMES DAILY
Status: ON HOLD | COMMUNITY
Start: 2023-06-01 | End: 2024-03-14

## 2023-06-12 NOTE — TELEPHONE ENCOUNTER
Specialty Pharmacy - Initial Clinical Assessment    Specialty Medication Orders Linked to Encounter      Flowsheet Row Most Recent Value   Medication #1 progesterone (PROGESTERONE IN OIL) 50 mg/mL injection (Order#[id number], Rx#[id number])          Patient Diagnosis   N97.9 - Infertility, female    Subjective    Annalise Nava is a 32 y.o. female, who is followed by the specialty pharmacy service for management and education.    Recent Encounters       Date Type Provider Description    06/01/2023 Specialty Pharmacy Kelly Mock PharmD Initial Clinical Assessment    06/01/2023 Specialty Pharmacy Kelly Mock PharmD     07/02/2021 Specialty Pharmacy LATRELL VELEZ Refill Coordination    06/14/2021 Specialty Pharmacy Julia Sadler PharmD Initial Clinical Assessment    06/14/2021 Specialty Pharmacy Elyssa Joshi Referral Authorization            Current Outpatient Medications   Medication Sig Note    estradioL (ESTRACE) 2 MG tablet Take 1 tablet by mouth 2 (two) times a day.     ibuprofen (ADVIL,MOTRIN) 600 MG tablet Take 1 tablet (600 mg total) by mouth every 6 (six) hours as needed (cramping).     multivitamin capsule Take 1 capsule by mouth once daily.     ondansetron (ZOFRAN) 4 MG tablet Take 1 tablet (4 mg total) by mouth every 6 (six) hours as needed for Nausea.     doxycycline (VIBRAMYCIN) 100 MG Cap Take 100 mg by mouth 2 (two) times daily.     imiquimod (ALDARA) 5 % cream Apply topically 3 (three) times a week. 6/12/2023: Patient no longer taking    progesterone (PROGESTERONE IN OIL) 50 mg/mL injection Inject 50mg/ml intramuscular daily, please send supplies for injection    Last reviewed on 6/12/2023  2:08 PM by Kelly Mock PharmD    Review of patient's allergies indicates:  No Known AllergiesLast reviewed on  6/12/2023 2:07 PM by Kelly Mock    Drug Interactions    Drug interactions evaluated: yes  Clinically relevant drug interactions identified: no  Provided the patient with  educational material regarding drug interactions: not applicable         Adverse Effects    *All other systems reviewed and are negative       Assessment Questions - Documented Responses      Flowsheet Row Most Recent Value   Assessment    Medication Reconciliation completed for patient Yes   During the past 4 weeks, has patient missed any activities due to condition or medication? No   During the past 4 weeks, did patient have any of the following urgent care visits? None   Goals of Therapy Status Discussed (new start)   Status of the patients ability to self-administer: Is Able   All education points have been covered with patient? No, patient declined- printed education provided   Welcome packet contents reviewed and discussed with patient? Yes   Assesment completed? Yes   Plan Therapy being initiated   Do you need to open a clinical intervention (i-vent)? No   Do you want to schedule first shipment? Yes   Medication #1 Assessment Info    Patient status New medication, New to [facility]   Is this medication appropriate for the patient? Yes   Is this medication effective? Not yet started          Refill Questions - Documented Responses      Flowsheet Row Most Recent Value   Patient Availability and HIPAA Verification    Does patient want to proceed with activity? Yes   HIPAA/medical authority confirmed? Yes   Relationship to patient of person spoken to? Self   Refill Screening Questions    When does the patient need to receive the medication? 06/21/23   Refill Delivery Questions    How will the patient receive the medication? Mail   When does the patient need to receive the medication? 06/21/23   Shipping Address Home   Address in [facility] confirmed and updated if neccessary? Yes   Expected Copay ($) 45   Is the patient able to afford the medication copay? Yes   Payment Method CC on file   Days supply of Refill 30   Supplies needed? No supplies needed   Refill activity completed? Yes   Refill activity plan  "Refill scheduled   Shipment/Pickup Date: 06/15/23            Objective    She has a past medical history of Infertility, female and Irritable bowel syndrome without diarrhea.    Tried/failed medications: N/A    BP Readings from Last 4 Encounters:   02/25/22 (!) 145/80   12/17/21 126/79   11/22/21 120/70   01/29/21 132/89     Ht Readings from Last 4 Encounters:   05/11/23 5' 7" (1.702 m)   03/21/23 5' 7" (1.702 m)   06/06/22 5' 7" (1.702 m)   02/23/22 5' 7" (1.702 m)     Wt Readings from Last 4 Encounters:   05/11/23 70.3 kg (154 lb 15.7 oz)   03/21/23 70.3 kg (155 lb)   06/06/22 89.8 kg (198 lb)   02/23/22 89.8 kg (198 lb)       The goals of prescribed drug therapy management include:  Supporting patient to meet the prescriber's medical treatment objectives  Improving or maintaining quality of life  Maintaining optimal therapy adherence  Minimizing and managing side effects      Goals of Therapy Status: Discussed (new start)    Assessment/Plan  Patient plans to start therapy on 06/21/23      Indication, dosage, appropriateness, effectiveness, safety and convenience of her specialty medication(s) were reviewed today.     Patient Education   Pharmacist offer to  patient was declined. Printed educational materials will be provided with medication.  Patient did accept verbal education on the following topics:  · New or changed medications, including prescribe and over the counter medications and supplements    Tasks added this encounter   No tasks added.   Tasks due within next 3 months   6/12/2023 - Initial Clinical Assessment/Patient Education (1 Time Occurence)  6/1/2023 - Set up Initial Fill     Kelly Mock, PharmD  [facility]  [address] [facility] [address]  Phone: [phone]  Fax: [phone]  "

## 2023-07-08 ENCOUNTER — LAB VISIT (OUTPATIENT)
Dept: LAB | Facility: HOSPITAL | Age: 33
End: 2023-07-08
Attending: OBSTETRICS & GYNECOLOGY
Payer: COMMERCIAL

## 2023-07-08 DIAGNOSIS — Z32.00 UNCONFIRMED PREGNANCY: ICD-10-CM

## 2023-07-08 DIAGNOSIS — Z32.00 UNCONFIRMED PREGNANCY: Primary | ICD-10-CM

## 2023-07-08 LAB — HCG INTACT+B SERPL-ACNC: 284 MIU/ML

## 2023-07-08 PROCEDURE — 36415 COLL VENOUS BLD VENIPUNCTURE: CPT

## 2023-07-08 PROCEDURE — 84702 CHORIONIC GONADOTROPIN TEST: CPT

## 2023-07-08 PROCEDURE — 84144 ASSAY OF PROGESTERONE: CPT

## 2023-07-09 LAB — PROGEST SERPL-MCNC: 13.8 NG/ML

## 2023-07-10 ENCOUNTER — LAB VISIT (OUTPATIENT)
Dept: LAB | Facility: HOSPITAL | Age: 33
End: 2023-07-10
Attending: OBSTETRICS & GYNECOLOGY
Payer: COMMERCIAL

## 2023-07-10 DIAGNOSIS — Z32.00 UNCONFIRMED PREGNANCY: Primary | ICD-10-CM

## 2023-07-10 LAB — HCG INTACT+B SERPL-ACNC: 613 MIU/ML

## 2023-07-10 PROCEDURE — 36415 COLL VENOUS BLD VENIPUNCTURE: CPT | Performed by: OBSTETRICS & GYNECOLOGY

## 2023-07-10 PROCEDURE — 84702 CHORIONIC GONADOTROPIN TEST: CPT | Performed by: OBSTETRICS & GYNECOLOGY

## 2023-07-10 PROCEDURE — 84144 ASSAY OF PROGESTERONE: CPT | Performed by: OBSTETRICS & GYNECOLOGY

## 2023-07-11 ENCOUNTER — SPECIALTY PHARMACY (OUTPATIENT)
Dept: PHARMACY | Facility: CLINIC | Age: 33
End: 2023-07-11
Payer: COMMERCIAL

## 2023-07-11 LAB — PROGEST SERPL-MCNC: 27.7 NG/ML

## 2023-07-11 NOTE — TELEPHONE ENCOUNTER
Specialty Pharmacy - Refill Coordination    Specialty Medication Orders Linked to Encounter      Flowsheet Row Most Recent Value   Medication #1 progesterone (PROGESTERONE IN OIL) 50 mg/mL injection (Order#[id number], Rx#[id number])          Counseled patient on dose change, she will be taking 75 mg daily per MD order. Patient verbalized understanding.    Refill Questions - Documented Responses      Flowsheet Row Most Recent Value   Patient Availability and HIPAA Verification    Does patient want to proceed with activity? Yes   HIPAA/medical authority confirmed? Yes   Relationship to patient of person spoken to? Self   Refill Screening Questions    Would patient like to speak to a pharmacist? No   When does the patient need to receive the medication? 07/15/23   Refill Delivery Questions    How will the patient receive the medication? Mail   When does the patient need to receive the medication? 07/15/23   Shipping Address Home   Address in [facility] confirmed and updated if neccessary? Yes   Expected Copay ($) 45   Is the patient able to afford the medication copay? Yes   Payment Method CC on file   Days supply of Refill 20   Supplies needed? No supplies needed   Refill activity completed? Yes   Refill activity plan Refill scheduled   Shipment/Pickup Date: 07/13/23            Current Outpatient Medications   Medication Sig    doxycycline (VIBRAMYCIN) 100 MG Cap Take 100 mg by mouth 2 (two) times daily.    estradioL (ESTRACE) 2 MG tablet Take 1 tablet by mouth 2 (two) times a day.    ibuprofen (ADVIL,MOTRIN) 600 MG tablet Take 1 tablet (600 mg total) by mouth every 6 (six) hours as needed (cramping).    imiquimod (ALDARA) 5 % cream Apply topically 3 (three) times a week.    multivitamin capsule Take 1 capsule by mouth once daily.    ondansetron (ZOFRAN) 4 MG tablet Take 1 tablet (4 mg total) by mouth every 6 (six) hours as needed for Nausea.    progesterone (PROGESTERONE IN OIL) 50 mg/mL injection Inject 1 ml  (50mg/ml) into the muscle daily, please send supplies for injection    progesterone (PROGESTERONE IN OIL) 50 mg/mL injection Inject 75mg intramuscularly daily, please send supplies for injection   Last reviewed on 6/12/2023  2:08 PM by Kelly oMck PharmD    Review of patient's allergies indicates:  No Known Allergies Last reviewed on  6/12/2023 2:07 PM by Kelly Mock      Tasks added this encounter   No tasks added.   Tasks due within next 3 months   7/8/2023 - Refill Coordination Outreach (1 time occurrence)     Kelly Mock PharmD  [facility]  [address] [place] [id number]  Phone: [phone]  Fax: [phone]

## 2023-07-11 NOTE — TELEPHONE ENCOUNTER
Received progesterone prescription. Need to clarify the directions with prescriber. Outgoing call to prescriber office, Crystal will have the nurse call [facility] back for clarification.

## 2023-07-11 NOTE — TELEPHONE ENCOUNTER
Received clarification of directions from nurse Jordan. Updated in [facility].

## 2023-08-02 ENCOUNTER — SPECIALTY PHARMACY (OUTPATIENT)
Dept: PHARMACY | Facility: CLINIC | Age: 33
End: 2023-08-02
Payer: COMMERCIAL

## 2023-08-02 NOTE — TELEPHONE ENCOUNTER
Specialty Pharmacy - Refill Coordination    Specialty Medication Orders Linked to Encounter      Flowsheet Row Most Recent Value   Medication #1 progesterone (PROGESTERONE IN OIL) 50 mg/mL injection (Order#[id number], Rx#[id number])            Refill Questions - Documented Responses      Flowsheet Row Most Recent Value   Patient Availability and HIPAA Verification    Does patient want to proceed with activity? Yes   HIPAA/medical authority confirmed? Yes   Relationship to patient of person spoken to? Self   Refill Screening Questions    Would patient like to speak to a pharmacist? No   When does the patient need to receive the medication? 08/04/23   Refill Delivery Questions    How will the patient receive the medication? Mail   When does the patient need to receive the medication? 08/04/23   Shipping Address Home   Address in [facility] confirmed and updated if neccessary? Yes   Expected Copay ($) 45   Is the patient able to afford the medication copay? Yes   Payment Method CC on file   Days supply of Refill 20   Supplies needed? No supplies needed   Refill activity completed? Yes   Refill activity plan Refill scheduled   Shipment/Pickup Date: 08/03/23            Current Outpatient Medications   Medication Sig    doxycycline (VIBRAMYCIN) 100 MG Cap Take 100 mg by mouth 2 (two) times daily.    estradioL (ESTRACE) 2 MG tablet Take 1 tablet by mouth 2 (two) times a day.    ibuprofen (ADVIL,MOTRIN) 600 MG tablet Take 1 tablet (600 mg total) by mouth every 6 (six) hours as needed (cramping).    imiquimod (ALDARA) 5 % cream Apply topically 3 (three) times a week.    multivitamin capsule Take 1 capsule by mouth once daily.    ondansetron (ZOFRAN) 4 MG tablet Take 1 tablet (4 mg total) by mouth every 6 (six) hours as needed for Nausea.    progesterone (PROGESTERONE IN OIL) 50 mg/mL injection Inject 1 ml (50mg/ml) into the muscle daily, please send supplies for injection    progesterone (PROGESTERONE IN OIL) 50 mg/mL  injection Inject 75mg intramuscularly daily, please send supplies for injection   Last reviewed on 6/12/2023  2:08 PM by Kelly Mock PharmD    Review of patient's allergies indicates:  No Known Allergies Last reviewed on  6/12/2023 2:07 PM by Kelly Mock      Tasks added this encounter   No tasks added.   Tasks due within next 3 months   7/26/2023 - Refill Coordination Outreach (1 time occurrence)     Kelly Mock PharmD  [facility]  [address] [place] [id number]  Phone: [phone]  Fax: [phone]

## 2023-09-21 ENCOUNTER — HOSPITAL ENCOUNTER (EMERGENCY)
Facility: HOSPITAL | Age: 33
Discharge: HOME | End: 2023-09-22
Attending: EMERGENCY MEDICINE
Payer: COMMERCIAL

## 2023-09-21 DIAGNOSIS — O46.90 VAGINAL BLEEDING IN PREGNANCY: Primary | ICD-10-CM

## 2023-09-21 DIAGNOSIS — N93.9 VAGINAL BLEEDING: ICD-10-CM

## 2023-09-21 LAB
B-HCG UR QL: POSITIVE
BACTERIA #/AREA URNS HPF: NEGATIVE /HPF
BILIRUB UR QL STRIP: NEGATIVE
CLARITY UR: CLEAR
COLOR UR: COLORLESS
CTP QC/QA: YES
GLUCOSE UR QL STRIP: NEGATIVE
HGB UR QL STRIP: ABNORMAL
HYALINE CASTS #/AREA URNS LPF: 1 /LPF
KETONES UR QL STRIP: NEGATIVE
LEUKOCYTE ESTERASE UR QL STRIP: NEGATIVE
MICROSCOPIC COMMENT: ABNORMAL
NITRITE UR QL STRIP: NEGATIVE
PH UR STRIP: 6 [PH] (ref 5–8)
PROT UR QL STRIP: ABNORMAL
RBC #/AREA URNS HPF: >100 /HPF (ref 0–4)
SP GR UR STRIP: 1.01 (ref 1–1.03)
SQUAMOUS #/AREA URNS HPF: 0 /HPF
URN SPEC COLLECT METH UR: ABNORMAL
UROBILINOGEN UR STRIP-ACNC: NEGATIVE EU/DL
WBC #/AREA URNS HPF: 1 /HPF (ref 0–5)

## 2023-09-21 PROCEDURE — 81001 URINALYSIS AUTO W/SCOPE: CPT

## 2023-09-21 PROCEDURE — 81025 URINE PREGNANCY TEST: CPT

## 2023-09-21 PROCEDURE — 99284 EMERGENCY DEPT VISIT MOD MDM: CPT | Mod: 25

## 2023-09-22 ENCOUNTER — TELEPHONE (OUTPATIENT)
Dept: MATERNAL FETAL MEDICINE | Facility: CLINIC | Age: 33
End: 2023-09-22
Payer: COMMERCIAL

## 2023-09-22 VITALS
RESPIRATION RATE: 18 BRPM | HEIGHT: 67 IN | TEMPERATURE: 99 F | BODY MASS INDEX: 27.47 KG/M2 | HEART RATE: 76 BPM | WEIGHT: 175 LBS | DIASTOLIC BLOOD PRESSURE: 64 MMHG | SYSTOLIC BLOOD PRESSURE: 116 MMHG | OXYGEN SATURATION: 99 %

## 2023-09-22 DIAGNOSIS — O46.92 VAGINAL BLEEDING IN PREGNANCY, SECOND TRIMESTER: Primary | ICD-10-CM

## 2023-09-22 LAB
ALBUMIN SERPL BCP-MCNC: 4 G/DL (ref 3.5–5.2)
ALP SERPL-CCNC: 41 U/L (ref 55–135)
ALT SERPL W/O P-5'-P-CCNC: 12 U/L (ref 10–44)
ANION GAP SERPL CALC-SCNC: 7 MMOL/L (ref 8–16)
AST SERPL-CCNC: 21 U/L (ref 10–40)
BASOPHILS # BLD AUTO: 0.04 K/UL (ref 0–0.2)
BASOPHILS NFR BLD: 0.3 % (ref 0–1.9)
BILIRUB SERPL-MCNC: 0.5 MG/DL (ref 0.1–1)
BUN SERPL-MCNC: 15 MG/DL (ref 6–20)
CALCIUM SERPL-MCNC: 9.4 MG/DL (ref 8.7–10.5)
CHLORIDE SERPL-SCNC: 107 MMOL/L (ref 95–110)
CO2 SERPL-SCNC: 21 MMOL/L (ref 23–29)
CREAT SERPL-MCNC: 0.7 MG/DL (ref 0.5–1.4)
DIFFERENTIAL METHOD: ABNORMAL
EOSINOPHIL # BLD AUTO: 0.3 K/UL (ref 0–0.5)
EOSINOPHIL NFR BLD: 1.8 % (ref 0–8)
ERYTHROCYTE [DISTWIDTH] IN BLOOD BY AUTOMATED COUNT: 13 % (ref 11.5–14.5)
EST. GFR  (NO RACE VARIABLE): >60 ML/MIN/1.73 M^2
GLUCOSE SERPL-MCNC: 87 MG/DL (ref 70–110)
HCG INTACT+B SERPL-ACNC: NORMAL MIU/ML
HCT VFR BLD AUTO: 36.1 % (ref 37–48.5)
HGB BLD-MCNC: 12.5 G/DL (ref 12–16)
IMM GRANULOCYTES # BLD AUTO: 0.06 K/UL (ref 0–0.04)
IMM GRANULOCYTES NFR BLD AUTO: 0.4 % (ref 0–0.5)
LYMPHOCYTES # BLD AUTO: 1.5 K/UL (ref 1–4.8)
LYMPHOCYTES NFR BLD: 10.3 % (ref 18–48)
MCH RBC QN AUTO: 31.6 PG (ref 27–31)
MCHC RBC AUTO-ENTMCNC: 34.6 G/DL (ref 32–36)
MCV RBC AUTO: 91 FL (ref 82–98)
MONOCYTES # BLD AUTO: 0.9 K/UL (ref 0.3–1)
MONOCYTES NFR BLD: 6 % (ref 4–15)
NEUTROPHILS # BLD AUTO: 11.5 K/UL (ref 1.8–7.7)
NEUTROPHILS NFR BLD: 81.2 % (ref 38–73)
NRBC BLD-RTO: 0 /100 WBC
PLATELET # BLD AUTO: 257 K/UL (ref 150–450)
PMV BLD AUTO: 10.4 FL (ref 9.2–12.9)
POTASSIUM SERPL-SCNC: 3.8 MMOL/L (ref 3.5–5.1)
PROT SERPL-MCNC: 7.4 G/DL (ref 6–8.4)
RBC # BLD AUTO: 3.96 M/UL (ref 4–5.4)
SODIUM SERPL-SCNC: 135 MMOL/L (ref 136–145)
WBC # BLD AUTO: 14.11 K/UL (ref 3.9–12.7)

## 2023-09-22 PROCEDURE — 84702 CHORIONIC GONADOTROPIN TEST: CPT | Performed by: EMERGENCY MEDICINE

## 2023-09-22 PROCEDURE — 85025 COMPLETE CBC W/AUTO DIFF WBC: CPT | Performed by: EMERGENCY MEDICINE

## 2023-09-22 PROCEDURE — 80053 COMPREHEN METABOLIC PANEL: CPT | Performed by: EMERGENCY MEDICINE

## 2023-09-22 NOTE — ED PROVIDER NOTES
"Encounter Date: 2023       History     Chief Complaint   Patient presents with    Vaginal Bleeding     "Not spotting, more of a gushing, for the last two hours."  Patient is fifteen weeks pregnant. .  2/10 right lower quadrant "constant cramping feeling".     Emergent evaluation of a 32-year-old female  who presents to the ER due to vaginal bleeding that began 2 hours prior to arrival.  Patient was 15 weeks pregnant she has an in vitro fertilization pregnancy.  She was established with Dr. Chavez.  She reports she went to the bathroom to have a bowel movement was having to strain and saw blood in the toilet she determined that it was vaginal bleeding and not from the anus.  She reports she then noticed over the next several hours that each time she got up or moves she would feel a gush of blood sometimes sitting on the toilet she would have continuous bright red blood from the vagina.  She reports a very mild 2/10 aching pain in the right lower quadrant.  She was not taken any medication.  She is not feel weak dizzy or lightheaded no chest pain or shortness of breath.      Review of patient's allergies indicates:  No Known Allergies  Past Medical History:   Diagnosis Date    Infertility, female     IVF    Irritable bowel syndrome without diarrhea      History reviewed. No pertinent surgical history.  Family History   Problem Relation Age of Onset    Mitral valve prolapse Sister     Macular degeneration Maternal Aunt     Glaucoma Paternal Grandmother     Retinal detachment Neg Hx     Melanoma Neg Hx     Psoriasis Neg Hx     Lupus Neg Hx     Eczema Neg Hx      Social History     Tobacco Use    Smoking status: Never    Smokeless tobacco: Never   Substance Use Topics    Alcohol use: Not Currently     Comment: occasional    Drug use: No     Review of Systems   Constitutional:  Negative for activity change, appetite change, chills, diaphoresis, fatigue and fever.   HENT:  Negative for congestion, " postnasal drip, rhinorrhea and sore throat.    Respiratory:  Negative for cough, chest tightness, shortness of breath, wheezing and stridor.    Cardiovascular:  Negative for chest pain and palpitations.   Gastrointestinal:  Positive for abdominal pain. Negative for abdominal distention, blood in stool, constipation, diarrhea, nausea and vomiting.   Genitourinary:  Positive for vaginal bleeding. Negative for dysuria, flank pain, frequency, hematuria, pelvic pain and urgency.   Musculoskeletal:  Negative for arthralgias, back pain, myalgias, neck pain and neck stiffness.   Skin:  Negative for rash.   Neurological:  Negative for dizziness, syncope, weakness, light-headedness and headaches.   Hematological:  Does not bruise/bleed easily.   Psychiatric/Behavioral:  Negative for confusion. The patient is not nervous/anxious.    All other systems reviewed and are negative.      Physical Exam     Initial Vitals [09/21/23 2135]   BP Pulse Resp Temp SpO2   (!) 142/95 79 18 98.7 °F (37.1 °C) 98 %      MAP       --         Physical Exam    Nursing note and vitals reviewed.  Constitutional: She appears well-developed and well-nourished. She is not diaphoretic. No distress.   HENT:   Head: Normocephalic and atraumatic.   Right Ear: External ear normal.   Left Ear: External ear normal.   Nose: Nose normal.   Mouth/Throat: Oropharynx is clear and moist.   Eyes: Conjunctivae and EOM are normal. Pupils are equal, round, and reactive to light.   Neck: Neck supple. No tracheal deviation present.   Normal range of motion.  Cardiovascular:  Normal rate, regular rhythm, normal heart sounds and intact distal pulses.     Exam reveals no gallop and no friction rub.       No murmur heard.  Pulmonary/Chest: Breath sounds normal. No stridor. No respiratory distress. She has no wheezes. She has no rhonchi. She has no rales. She exhibits no tenderness.   Abdominal: Abdomen is soft. Bowel sounds are normal. She exhibits no distension and no mass.  There is abdominal tenderness.   Very mild tenderness to right lower quadrant appropriately gravid uterus for dates There is no rebound and no guarding.   Genitourinary:    Genitourinary Comments: moderate amount of bright red blood in the vaginal vault with 1 blood clot cervix was closed bleeding was coming from the cervix.     Musculoskeletal:         General: No edema. Normal range of motion.      Cervical back: Normal range of motion and neck supple.     Neurological: She is alert and oriented to person, place, and time. She has normal strength. No cranial nerve deficit or sensory deficit.   Skin: Skin is warm and dry. No rash noted. No erythema. No pallor.   Psychiatric: She has a normal mood and affect. Her behavior is normal. Judgment and thought content normal.         ED Course   Procedures  Labs Reviewed   URINALYSIS, REFLEX TO URINE CULTURE - Abnormal; Notable for the following components:       Result Value    Color, UA Colorless (*)     Protein, UA Trace (*)     Occult Blood UA 3+ (*)     All other components within normal limits    Narrative:     Specimen Source->Urine   URINALYSIS MICROSCOPIC - Abnormal; Notable for the following components:    RBC, UA >100 (*)     All other components within normal limits    Narrative:     Specimen Source->Urine   CBC W/ AUTO DIFFERENTIAL - Abnormal; Notable for the following components:    WBC 14.11 (*)     RBC 3.96 (*)     Hematocrit 36.1 (*)     MCH 31.6 (*)     Gran # (ANC) 11.5 (*)     Immature Grans (Abs) 0.06 (*)     Gran % 81.2 (*)     Lymph % 10.3 (*)     All other components within normal limits    Narrative:     Release to patient->Immediate   COMPREHENSIVE METABOLIC PANEL - Abnormal; Notable for the following components:    Sodium 135 (*)     CO2 21 (*)     Alkaline Phosphatase 41 (*)     Anion Gap 7 (*)     All other components within normal limits    Narrative:     Release to patient->Immediate   POCT URINE PREGNANCY - Abnormal; Notable for the  following components:    POC Preg Test, Ur Positive (*)     All other components within normal limits   HCG, QUANTITATIVE    Narrative:     Release to patient->Immediate          Imaging Results              US OB 14+ Wks, TransAbd, Single Gestation (Final result)  Result time 23 00:30:22      Final result by Kendall Egan MD (23 00:30:22)                   Narrative:    Examination: Early second trimester obstetrical sonogram.    Comparisons: None.    HISTORY: Bleeding.    FINDINGS:    There is a living single intrauterine pregnancy with a fetal heart rate 153 bpm.    A developing posterior placenta is noted. Fetus is in cephalic presentation.    Biometry:  BPD: 32 mm corresponding to 16 weeks 0 days  Head circumference: 120 mm corresponding to 16 weeks 0 days  Abdominal circumference: 99 mm corresponding to 16 weeks 0 days  Femur length: 19 mm corresponding to 15 weeks 4 days.    IMPRESSION:  1. Living single intrauterine pregnancy with an average ultrasound age of 15 weeks 6 days.    Electronically signed by:  Kendall Egan MD  2023 12:30 AM CDT Workstation: [id number]                                     Medications - No data to display  Medical Decision Making  Emergent evaluation of a 32-year-old female  who presents to the ER due to vaginal bleeding that began 2 hours prior to arrival.  Patient was 15 weeks pregnant she has an in vitro fertilization pregnancy.  She was established with Dr. Chavez.  She reports she went to the bathroom to have a bowel movement was having to strain and saw blood in the toilet she determined that it was vaginal bleeding and not from the anus.  She reports she then noticed over the next several hours that each time she got up or moves she would feel a gush of blood sometimes sitting on the toilet she would have continuous bright red blood from the vagina.  She reports a very mild 2/10 aching pain in the right lower quadrant.  She was not taken any  medication.  She is not feel weak dizzy or lightheaded no chest pain or shortness of breath.  On physical exam vital signs are normal blood pressure 142/95 pulse 79 respirations 18 sats 90% afebrile she was not pale she was soft abdomen appropriately gravid uterus for dates very mild tenderness to right lower quadrant.  Pelvic exam revealed a moderate amount of bright red blood in the vaginal vault with 1 blood clot cervix was closed bleeding was coming from the cervix.  [facility]    Patient presents for emergent evaluation of acute 15 weeks pregnant with vaginal bleeding x2 hours mild right lower quadrant pain that poses a threat to life and/or bodily function.   Differential diagnosis includes but was not limited to subchorionic hemorrhage, placental abruption, placenta previa, vaginal or cervical injury, incomplete  missed  inevitable , vaginal bleeding in early pregnancy, symptomatic anemia.   In the ED patient found to have acute vaginal bleeding in early pregnancy threatened Ab.    I ordered labs and personally reviewed them.  Labs significant for see below  I ordered ultrasound OB and personally reviewed it and reviewed the radiologist interpretation.  Ultrasound significant for IMPRESSION:   1. Living single intrauterine pregnancy with an average ultrasound age of 15 weeks 6 days.  Fetal heart tones of 153 developing posterior placenta is noted.      Discharge [facility]   I spoke with the patient's OBGYN Dr. Chavez who will see the patient at 8:20 a.m. this morning he reports she can be discharged home with pelvic rest and he will see her today.  She understands return for new or worsening symptoms  Patient was managed in the ED with no medications here  The response to treatment was stable  Patient was discharged in stable condition.  Detailed return precautions discussed.  Patient was told to follow up with primary care physician or specialist based on their diagnosis  Malika Blackwell  MD      Amount and/or Complexity of Data Reviewed  Labs: ordered.     Details: Urine is colorless trace protein 3+ blood more than 100 red blood cells  UPT positive  Beta quant 47,968  Normal CMP  CBC with a white count of 14.11 hemoglobin 12.5 hematocrit  Radiology: ordered.  Discussion of management or test interpretation with external provider(s): OBGYN                               Clinical Impression:   Final diagnoses:  [N93.9] Vaginal bleeding  [O46.90] Vaginal bleeding in pregnancy (Primary)        ED Disposition Condition    Discharge Stable          ED Prescriptions    None       Follow-up Information       Follow up With Specialties Details Why Contact Info Additional Information    oTby Chavez MD Obstetrics and Gynecology Go today at 8:20 am Dr Chavez told her to come at this time. [address] [facility]  [address] [facility] [address]  [phone]       [facility] - Emergency Dept Emergency Medicine Go to  If symptoms worsen [address] [facility] [address]  [phone] 1st floor             Malika Blackwell MD  09/22/23 0631

## 2023-09-22 NOTE — TELEPHONE ENCOUNTER
Called patient and scheduled her for a Maternal Fetal Medicine consult and ultrasound for 10 am 09/26.  Patient verbalized her understanding.      ----- Message from Sharmin Zimmerman sent at 9/22/2023 11:58 AM CDT -----  Regarding: Referral  Dr. Toby Win sent the [facility] team a referral for the following patient to be seen in the Maternal Fetal Medicine Clinic. I have updated the patients demographics and scanned their records into media manager.     Can you please connect with the patient in order to schedule their MFM appointment please?     Thank you,   [facility]la

## 2023-09-26 ENCOUNTER — OFFICE VISIT (OUTPATIENT)
Dept: MATERNAL FETAL MEDICINE | Facility: CLINIC | Age: 33
End: 2023-09-26
Payer: COMMERCIAL

## 2023-09-26 ENCOUNTER — PROCEDURE VISIT (OUTPATIENT)
Dept: MATERNAL FETAL MEDICINE | Facility: CLINIC | Age: 33
End: 2023-09-26
Payer: COMMERCIAL

## 2023-09-26 VITALS
WEIGHT: 171.06 LBS | SYSTOLIC BLOOD PRESSURE: 132 MMHG | DIASTOLIC BLOOD PRESSURE: 75 MMHG | BODY MASS INDEX: 26.85 KG/M2 | HEIGHT: 67 IN

## 2023-09-26 DIAGNOSIS — O09.812 PREGNANCY RESULTING FROM IN VITRO FERTILIZATION IN SECOND TRIMESTER: ICD-10-CM

## 2023-09-26 DIAGNOSIS — O46.92 VAGINAL BLEEDING IN PREGNANCY, SECOND TRIMESTER: ICD-10-CM

## 2023-09-26 DIAGNOSIS — O20.9 VAGINAL BLEEDING AFFECTING EARLY PREGNANCY: ICD-10-CM

## 2023-09-26 PROCEDURE — 3075F SYST BP GE 130 - 139MM HG: CPT | Mod: CPTII,S$GLB,, | Performed by: OBSTETRICS & GYNECOLOGY

## 2023-09-26 PROCEDURE — 3008F BODY MASS INDEX DOCD: CPT | Mod: CPTII,S$GLB,, | Performed by: OBSTETRICS & GYNECOLOGY

## 2023-09-26 PROCEDURE — 3078F PR MOST RECENT DIASTOLIC BLOOD PRESSURE < 80 MM HG: ICD-10-PCS | Mod: CPTII,S$GLB,, | Performed by: OBSTETRICS & GYNECOLOGY

## 2023-09-26 PROCEDURE — 99999 PR PBB SHADOW E&M-EST. PATIENT-LVL III: ICD-10-PCS | Mod: PBBFAC,,, | Performed by: OBSTETRICS & GYNECOLOGY

## 2023-09-26 PROCEDURE — 76805 OB US >/= 14 WKS SNGL FETUS: CPT | Mod: S$GLB,,, | Performed by: OBSTETRICS & GYNECOLOGY

## 2023-09-26 PROCEDURE — 99999 PR PBB SHADOW E&M-EST. PATIENT-LVL III: CPT | Mod: PBBFAC,,, | Performed by: OBSTETRICS & GYNECOLOGY

## 2023-09-26 PROCEDURE — 76805 PR US, OB 14+WKS, TRANSABD, SINGLE GESTATION: ICD-10-PCS | Mod: S$GLB,,, | Performed by: OBSTETRICS & GYNECOLOGY

## 2023-09-26 PROCEDURE — 99203 PR OFFICE/OUTPT VISIT, NEW, LEVL III, 30-44 MIN: ICD-10-PCS | Mod: 25,S$GLB,, | Performed by: OBSTETRICS & GYNECOLOGY

## 2023-09-26 PROCEDURE — 3075F PR MOST RECENT SYSTOLIC BLOOD PRESS GE 130-139MM HG: ICD-10-PCS | Mod: CPTII,S$GLB,, | Performed by: OBSTETRICS & GYNECOLOGY

## 2023-09-26 PROCEDURE — 3078F DIAST BP <80 MM HG: CPT | Mod: CPTII,S$GLB,, | Performed by: OBSTETRICS & GYNECOLOGY

## 2023-09-26 PROCEDURE — 3008F PR BODY MASS INDEX (BMI) DOCUMENTED: ICD-10-PCS | Mod: CPTII,S$GLB,, | Performed by: OBSTETRICS & GYNECOLOGY

## 2023-09-26 PROCEDURE — 99203 OFFICE O/P NEW LOW 30 MIN: CPT | Mod: 25,S$GLB,, | Performed by: OBSTETRICS & GYNECOLOGY

## 2023-09-26 RX ORDER — DOCUSATE SODIUM 100 MG/1
100 CAPSULE, LIQUID FILLED ORAL 2 TIMES DAILY
Status: ON HOLD | COMMUNITY
End: 2024-03-14

## 2023-09-26 RX ORDER — ASPIRIN 81 MG/1
81 TABLET ORAL DAILY
Status: ON HOLD | COMMUNITY
End: 2024-03-15

## 2023-09-26 NOTE — PROGRESS NOTES
MATERNAL-FETAL MEDICINE   CONSULT NOTE    Provider requesting consultation: Dr. Chavez    SUBJECTIVE:     Ms. Annalise Nava is a 32 y.o.  female with IUP at 15w5d who is seen in consultation by MFM for evaluation and management of:  Problem   Vaginal Bleeding Affecting Early Pregnancy   Pregnancy Resulting From in Vitro Fertilization in Second Trimester            Medication List with Changes/Refills   Current Medications    ASPIRIN (ECOTRIN) 81 MG EC TABLET    Take 81 mg by mouth once daily.    DOCUSATE SODIUM (COLACE) 100 MG CAPSULE    Take 100 mg by mouth 2 (two) times daily.    MULTIVITAMIN CAPSULE    Take 1 capsule by mouth once daily.    ONDANSETRON (ZOFRAN) 4 MG TABLET    Take 1 tablet (4 mg total) by mouth every 6 (six) hours as needed for Nausea.    PRENATAL 105-IRON-FOLIC AC-DHA 30 MG IRON- 1.4 MG-300 MG CMPK    Take by mouth.       Review of patient's allergies indicates:  No Known Allergies    PMH:  Past Medical History:   Diagnosis Date    Infertility, female     IVF    Irritable bowel syndrome without diarrhea        PObHx:  OB History    Para Term  AB Living   3 1 1   1 1   SAB IAB Ectopic Multiple Live Births   1     0 1      # Outcome Date GA Lbr Wallace/2nd Weight Sex Delivery Anes PTL Lv   3 Current            2 Term 22 40w3d 24:30 / 01:47 3.29 kg (7 lb 4.1 oz) F Vag-Spont EPI N BETITO   1 SAB 2020 8w0d              PSH:No past surgical history on file.    Family history:family history includes Glaucoma in her paternal grandmother; Macular degeneration in her maternal aunt; Mitral valve prolapse in her sister.    Social history: reports that she has never smoked. She has never used smokeless tobacco. She reports that she does not currently use alcohol. She reports that she does not use drugs.    Genetic history:  The patient denies any inherited genetic diseases or birth defects in herself or her partner's personal history or family. PGT-A normal.    Objective:   /75 (BP  "Location: Left arm, Patient Position: Sitting, BP Method: Medium (Automatic))   Ht 5' 7" (1.702 m)   Wt 77.6 kg (171 lb 1.2 oz)   BMI 26.79 kg/m²       Ultrasound performed. See viewpoint for full ultrasound report.  Limited early anatomy appears unremarkable.   Normal amniotic fluid volume  Biometry is consistent with prior dating criteria.   Anterior placenta   A subchorionic hematoma is noted along the posterior uterus beneath the chorion. It does not extend into the placenta and there is no evidence of a retroplacental hematoma at this time.      ASSESSMENT/PLAN:     32 y.o.  female with IUP at 15w5d     Vaginal bleeding affecting early pregnancy  On  she had an episode of bleeding that lasted for approximately 8 hours.  Initially it started with bright red bleeding and then trickled for awhile.  She did not really have any significant cramping with that but did have pelvic pressure and aching.  She was seen in the emergency room in [place] and was diagnosed with a subchorionic hematoma.  Since then she has not had any further bright red bleeding and has had only old brown blood or spotting.  She is had no leakage of fluid.  There was no precipitating event.    On ultrasound, we see evidence of a subchorionic hematoma along the posterior aspect of the uterus.  It does not extend into the placenta and there is no evidence of a retroplacental clot.    I counseled her regarding these findings.  We discussed that a subchorionic hematoma is a risk factor for  birth.  We discussed that blood can cause an inflammatory response which can lead to uterine contractions and inflammation in the membranes which increase the risk of rupture.  Fortunately, most of the time when the bleeding a self-limited and a 1 time episode the risk is not markedly increased.  We also discussed that when a subchorionic hematoma extends into the placenta there is an increased risk for placental dysfunction and " oligohydramnios as well as growth restriction.  At this time we see no evidence of that.      Recommendations:  1. We will plan repeat ultrasound for fluid and growth in approximately 4 weeks and to reassess the hematoma at that time.  2. To allow time for clot stabilization, I told her to stop her low-dose aspirin for the next 7 to 10 days.  If she is not had any further bleeding, she can restart it at that time.    3. We discussed that she can resume normal activities but should refrain from any heavy lifting or strenuous exercise.      Pregnancy resulting from in vitro fertilization in second trimester  The use of IVF has been associated with an increase in preeclampsia, gestational hypertension, placental abruption, placenta previa, and risk of  delivery. Due to the potential increased risk of congenital malformations following IVF, a targeted anatomic ultrasound is recommended at 18-20 weeks. In addition, a fetal echocardiogram may be considered if the targeted images are incomplete given the 2-fold associated risk of congenital cardiac anomalies.  While the association between ART and these adverse outcomes raise some concern, it is important to note that the literature is contradictory on this subject and the chances of having a healthy child conceived with ART are overall extremely high.    She had PGT-A which was normal. We discussed limitations and discussed other screening and diagnostic options. She declined any further testing at this time.    Recommendations:  Targeted ultrasound for anatomical survey at 18-20 weeks.  Consider fetal echocardiogram at 22 weeks if cardiac views are suboptimal on detailed ultrasound.  Consider low dose aspirin 81 mg daily at 12-16 weeks for preeclampsia risk reduction  Close monitoring for preeclampsia.  Fetal growth ultrasound at 32-34 weeks  Start weekly antepartum testing at 36 weeks' gestation  Consider delivery between 39 0/7 - 39 6/7 weeks'  gestation        FOLLOW UP:     F/u in 4 weeks for US/MFM visit      35 minutes of total time spent on the encounter, which includes face to face time and non-face to face time preparing to see the patient (eg, review of tests), obtaining and/or reviewing separately obtained history, documenting clinical information in the electronic or other health record, independently interpreting results (not separately reported) and communicating results to the patient/family/caregiver, or care coordination (not separately reported).      Georges Kramer  Maternal-Fetal Medicine    Electronically Signed by Georges Kramer September 26, 2023

## 2023-09-26 NOTE — ASSESSMENT & PLAN NOTE
On  she had an episode of bleeding that lasted for approximately 8 hours.  Initially it started with bright red bleeding and then trickled for awhile.  She did not really have any significant cramping with that but did have pelvic pressure and aching.  She was seen in the emergency room in [place] and was diagnosed with a subchorionic hematoma.  Since then she has not had any further bright red bleeding and has had only old brown blood or spotting.  She is had no leakage of fluid.  There was no precipitating event.    On ultrasound, we see evidence of a subchorionic hematoma along the posterior aspect of the uterus.  It does not extend into the placenta and there is no evidence of a retroplacental clot.    I counseled her regarding these findings.  We discussed that a subchorionic hematoma is a risk factor for  birth.  We discussed that blood can cause an inflammatory response which can lead to uterine contractions and inflammation in the membranes which increase the risk of rupture.  Fortunately, most of the time when the bleeding a self-limited and a 1 time episode the risk is not markedly increased.  We also discussed that when a subchorionic hematoma extends into the placenta there is an increased risk for placental dysfunction and oligohydramnios as well as growth restriction.  At this time we see no evidence of that.      Recommendations:  1. We will plan repeat ultrasound for fluid and growth in approximately 4 weeks and to reassess the hematoma at that time.  2. To allow time for clot stabilization, I told her to stop her low-dose aspirin for the next 7 to 10 days.  If she is not had any further bleeding, she can restart it at that time.    3. We discussed that she can resume normal activities but should refrain from any heavy lifting or strenuous exercise.

## 2023-09-26 NOTE — ASSESSMENT & PLAN NOTE
The use of IVF has been associated with an increase in preeclampsia, gestational hypertension, placental abruption, placenta previa, and risk of  delivery. Due to the potential increased risk of congenital malformations following IVF, a targeted anatomic ultrasound is recommended at 18-20 weeks. In addition, a fetal echocardiogram may be considered if the targeted images are incomplete given the 2-fold associated risk of congenital cardiac anomalies.  While the association between ART and these adverse outcomes raise some concern, it is important to note that the literature is contradictory on this subject and the chances of having a healthy child conceived with ART are overall extremely high.    She had PGT-A which was normal. We discussed limitations and discussed other screening and diagnostic options. She declined any further testing at this time.    Recommendations:   Targeted ultrasound for anatomical survey at 18-20 weeks.   Consider fetal echocardiogram at 22 weeks if cardiac views are suboptimal on detailed ultrasound.   Consider low dose aspirin 81 mg daily at 12-16 weeks for preeclampsia risk reduction   Close monitoring for preeclampsia.   Fetal growth ultrasound at 32-34 weeks   Start weekly antepartum testing at 36 weeks' gestation   Consider delivery between 39 0/7 - 39 6/7 weeks' gestation

## 2023-10-06 DIAGNOSIS — Z36.89 ENCOUNTER FOR FETAL ANATOMIC SURVEY: Primary | ICD-10-CM

## 2023-10-25 ENCOUNTER — OFFICE VISIT (OUTPATIENT)
Dept: MATERNAL FETAL MEDICINE | Facility: CLINIC | Age: 33
End: 2023-10-25
Payer: COMMERCIAL

## 2023-10-25 ENCOUNTER — PROCEDURE VISIT (OUTPATIENT)
Dept: MATERNAL FETAL MEDICINE | Facility: CLINIC | Age: 33
End: 2023-10-25
Payer: COMMERCIAL

## 2023-10-25 VITALS
WEIGHT: 170.88 LBS | DIASTOLIC BLOOD PRESSURE: 68 MMHG | BODY MASS INDEX: 26.76 KG/M2 | SYSTOLIC BLOOD PRESSURE: 122 MMHG

## 2023-10-25 DIAGNOSIS — O44.00 PLACENTA PREVIA ANTEPARTUM: ICD-10-CM

## 2023-10-25 DIAGNOSIS — O20.9 VAGINAL BLEEDING AFFECTING EARLY PREGNANCY: ICD-10-CM

## 2023-10-25 DIAGNOSIS — Z36.2 ENCOUNTER FOR FOLLOW-UP ULTRASOUND OF FETAL ANATOMY: Primary | ICD-10-CM

## 2023-10-25 DIAGNOSIS — O09.812 PREGNANCY RESULTING FROM IN VITRO FERTILIZATION IN SECOND TRIMESTER: ICD-10-CM

## 2023-10-25 DIAGNOSIS — Z36.89 ENCOUNTER FOR FETAL ANATOMIC SURVEY: ICD-10-CM

## 2023-10-25 PROCEDURE — 3008F BODY MASS INDEX DOCD: CPT | Mod: CPTII,S$GLB,, | Performed by: OBSTETRICS & GYNECOLOGY

## 2023-10-25 PROCEDURE — 76817 TRANSVAGINAL US OBSTETRIC: CPT | Mod: S$GLB,,, | Performed by: OBSTETRICS & GYNECOLOGY

## 2023-10-25 PROCEDURE — 76811 US MFM PROCEDURE (VIEWPOINT): ICD-10-PCS | Mod: S$GLB,,, | Performed by: OBSTETRICS & GYNECOLOGY

## 2023-10-25 PROCEDURE — 1159F MED LIST DOCD IN RCRD: CPT | Mod: CPTII,S$GLB,, | Performed by: OBSTETRICS & GYNECOLOGY

## 2023-10-25 PROCEDURE — 99999 PR PBB SHADOW E&M-EST. PATIENT-LVL III: CPT | Mod: PBBFAC,,, | Performed by: OBSTETRICS & GYNECOLOGY

## 2023-10-25 PROCEDURE — 3074F SYST BP LT 130 MM HG: CPT | Mod: CPTII,S$GLB,, | Performed by: OBSTETRICS & GYNECOLOGY

## 2023-10-25 PROCEDURE — 76811 OB US DETAILED SNGL FETUS: CPT | Mod: S$GLB,,, | Performed by: OBSTETRICS & GYNECOLOGY

## 2023-10-25 PROCEDURE — 3078F DIAST BP <80 MM HG: CPT | Mod: CPTII,S$GLB,, | Performed by: OBSTETRICS & GYNECOLOGY

## 2023-10-25 PROCEDURE — 3074F PR MOST RECENT SYSTOLIC BLOOD PRESSURE < 130 MM HG: ICD-10-PCS | Mod: CPTII,S$GLB,, | Performed by: OBSTETRICS & GYNECOLOGY

## 2023-10-25 PROCEDURE — 76817 US MFM PROCEDURE (VIEWPOINT): ICD-10-PCS | Mod: S$GLB,,, | Performed by: OBSTETRICS & GYNECOLOGY

## 2023-10-25 PROCEDURE — 99214 PR OFFICE/OUTPT VISIT, EST, LEVL IV, 30-39 MIN: ICD-10-PCS | Mod: 25,S$GLB,, | Performed by: OBSTETRICS & GYNECOLOGY

## 2023-10-25 PROCEDURE — 99999 PR PBB SHADOW E&M-EST. PATIENT-LVL III: ICD-10-PCS | Mod: PBBFAC,,, | Performed by: OBSTETRICS & GYNECOLOGY

## 2023-10-25 PROCEDURE — 3078F PR MOST RECENT DIASTOLIC BLOOD PRESSURE < 80 MM HG: ICD-10-PCS | Mod: CPTII,S$GLB,, | Performed by: OBSTETRICS & GYNECOLOGY

## 2023-10-25 PROCEDURE — 99214 OFFICE O/P EST MOD 30 MIN: CPT | Mod: 25,S$GLB,, | Performed by: OBSTETRICS & GYNECOLOGY

## 2023-10-25 PROCEDURE — 1159F PR MEDICATION LIST DOCUMENTED IN MEDICAL RECORD: ICD-10-PCS | Mod: CPTII,S$GLB,, | Performed by: OBSTETRICS & GYNECOLOGY

## 2023-10-25 PROCEDURE — 3008F PR BODY MASS INDEX (BMI) DOCUMENTED: ICD-10-PCS | Mod: CPTII,S$GLB,, | Performed by: OBSTETRICS & GYNECOLOGY

## 2023-10-25 NOTE — ASSESSMENT & PLAN NOTE
Eight day episode of bleeding on September 21st. Subchorionic hematoma along the posterior aspect of the uterus.  It does not extend into the placenta and there is no evidence of a retroplacental clot. See previous notes for full counseling on this finding.     Patient continues to report daily dark vaginal spotting. Counseled that subchorionic hematoma is still visualized, although slightly decreased in size. Reviewed pelvic rest and activity precautions.

## 2023-10-25 NOTE — ASSESSMENT & PLAN NOTE
See prior notes for full counseling.     She had PGT-A which was normal. She has previously declined additional screening or diagnostic testing.    Recommendations:   Targeted ultrasound for anatomical survey at 18-20 weeks - suboptimal today, plan for repeat assessment in 4-6 weeks   Consider fetal echocardiogram at 22 weeks if cardiac views are suboptimal on detailed ultrasound.   Close monitoring for preeclampsia.   Fetal growth ultrasound at 32-34 weeks   Start weekly antepartum testing at 36 weeks' gestation   Consider delivery between 39 0/7 - 39 6/7 weeks' gestation

## 2023-10-25 NOTE — PROGRESS NOTES
Maternal Fetal Medicine follow up consult    SUBJECTIVE:     Annalise Nava is a 32 y.o.  female with IUP at 19w6d who is seen in follow up consultation by MFM.  Pregnancy complications include:   Problem   Placenta Previa Antepartum   Vaginal Bleeding Affecting Early Pregnancy   Pregnancy Resulting From in Vitro Fertilization in Second Trimester       Previous notes reviewed.   No changes to medical, surgical, family, social, or obstetric history.    Interval history since last MFM visit:   Pt continues to report almost daily dark vaginal spotting. Denies any cramping or contractions. Denies bright red vaginal bleeding.     Medications:  Current Outpatient Medications   Medication Instructions    prenatal 105-iron-folic ac-dha 30 mg iron- 1.4 mg-300 mg Cmpk Oral                 Care team members:  Dr. Chavez - Primary OB     OBJECTIVE:   /68 (BP Location: Left arm, Patient Position: Sitting)   Wt 77.5 kg (170 lb 13.7 oz)   BMI 26.76 kg/m²     Ultrasound performed. See viewpoint for full ultrasound report.  A detailed fetal anatomic ultrasound examination was performed for the following high risk indication: IVF.   No fetal structural malformations are identified; however, fetal imaging is incomplete today.   A follow-up study will be scheduled to complete the fetal anatomic survey.   Fetal size today is consistent with established gestational age.   Cervical length by TV scanning is normal.   An anterior placenta previa is identified with a marginal sinus/subchorionic hematoma extending along the posterior uterus beneath the chorion. It does not extend into the placenta and there is no evidence of a retroplacental hematoma at this time.    ASSESSMENT/PLAN:     32 y.o.  female with IUP at 19w6d    Pregnancy resulting from in vitro fertilization in second trimester  See prior notes for full counseling.     She had PGT-A which was normal. She has previously declined additional screening or  diagnostic testing.    Recommendations:  Targeted ultrasound for anatomical survey at 18-20 weeks - suboptimal today, plan for repeat assessment in 4-6 weeks  Consider fetal echocardiogram at 22 weeks if cardiac views are suboptimal on detailed ultrasound.  Close monitoring for preeclampsia.  Fetal growth ultrasound at 32-34 weeks  Start weekly antepartum testing at 36 weeks' gestation  Consider delivery between 39 0/7 - 39 6/7 weeks' gestation      Vaginal bleeding affecting early pregnancy  Eight day episode of bleeding on September 21st. Subchorionic hematoma along the posterior aspect of the uterus.  It does not extend into the placenta and there is no evidence of a retroplacental clot. See previous notes for full counseling on this finding.     Patient continues to report daily dark vaginal spotting. Counseled that subchorionic hematoma is still visualized, although slightly decreased in size. Reviewed pelvic rest and activity precautions.     Placenta previa antepartum  An anterior placenta identified a subchorionic hematoma noted along the posterior uterus beneath the chorion. It does not extend into the placenta and there is no evidence of a retroplacental hematoma at this time. Due to a uterine contraction and the location of the anterior placenta and adjacent subchorionic hematoma unable to rule out placenta previa. Would recommend managing as placenta previa.  Patient counseled on pelvic rest and activity restrictions in the setting of suspected previa with bleeding.     F/u in 4 weeks for MFM visit and US.     Margaret Estrada MD  PGY 6  Maternal Fetal Medicine  [facility]

## 2023-10-25 NOTE — ASSESSMENT & PLAN NOTE
An anterior placenta identified a subchorionic hematoma noted along the posterior uterus beneath the chorion. It does not extend into the placenta and there is no evidence of a retroplacental hematoma at this time. Due to a uterine contraction and the location of the anterior placenta and adjacent subchorionic hematoma unable to rule out placenta previa. Would recommend managing as placenta previa.  Patient counseled on pelvic rest and activity restrictions in the setting of suspected previa with bleeding.

## 2023-11-21 ENCOUNTER — PROCEDURE VISIT (OUTPATIENT)
Dept: MATERNAL FETAL MEDICINE | Facility: CLINIC | Age: 33
End: 2023-11-21
Payer: COMMERCIAL

## 2023-11-21 ENCOUNTER — LAB VISIT (OUTPATIENT)
Dept: LAB | Facility: HOSPITAL | Age: 33
End: 2023-11-21
Attending: OBSTETRICS & GYNECOLOGY
Payer: COMMERCIAL

## 2023-11-21 ENCOUNTER — OFFICE VISIT (OUTPATIENT)
Dept: MATERNAL FETAL MEDICINE | Facility: CLINIC | Age: 33
End: 2023-11-21
Payer: COMMERCIAL

## 2023-11-21 VITALS — DIASTOLIC BLOOD PRESSURE: 77 MMHG | WEIGHT: 178.13 LBS | BODY MASS INDEX: 27.9 KG/M2 | SYSTOLIC BLOOD PRESSURE: 123 MMHG

## 2023-11-21 DIAGNOSIS — Z36.89 ENCOUNTER FOR ULTRASOUND TO ASSESS FETAL GROWTH: Primary | ICD-10-CM

## 2023-11-21 DIAGNOSIS — Z36.2 ENCOUNTER FOR FOLLOW-UP ULTRASOUND OF FETAL ANATOMY: ICD-10-CM

## 2023-11-21 DIAGNOSIS — O09.812 PREGNANCY RESULTING FROM IN VITRO FERTILIZATION IN SECOND TRIMESTER: ICD-10-CM

## 2023-11-21 DIAGNOSIS — O44.00 PLACENTA PREVIA ANTEPARTUM: Primary | ICD-10-CM

## 2023-11-21 DIAGNOSIS — O44.00 PLACENTA PREVIA ANTEPARTUM: ICD-10-CM

## 2023-11-21 PROBLEM — O41.1230 CHORIOAMNIONITIS IN THIRD TRIMESTER: Status: RESOLVED | Noted: 2022-02-23 | Resolved: 2023-11-21

## 2023-11-21 PROBLEM — Z34.90 ENCOUNTER FOR INDUCTION OF LABOR: Status: RESOLVED | Noted: 2022-02-23 | Resolved: 2023-11-21

## 2023-11-21 LAB
BASOPHILS # BLD AUTO: 0.04 K/UL (ref 0–0.2)
BASOPHILS NFR BLD: 0.3 % (ref 0–1.9)
DIFFERENTIAL METHOD: ABNORMAL
EOSINOPHIL # BLD AUTO: 0.2 K/UL (ref 0–0.5)
EOSINOPHIL NFR BLD: 1.2 % (ref 0–8)
ERYTHROCYTE [DISTWIDTH] IN BLOOD BY AUTOMATED COUNT: 13.3 % (ref 11.5–14.5)
HCT VFR BLD AUTO: 33.8 % (ref 37–48.5)
HGB BLD-MCNC: 11.3 G/DL (ref 12–16)
IMM GRANULOCYTES # BLD AUTO: 0.12 K/UL (ref 0–0.04)
IMM GRANULOCYTES NFR BLD AUTO: 0.9 % (ref 0–0.5)
LYMPHOCYTES # BLD AUTO: 1.4 K/UL (ref 1–4.8)
LYMPHOCYTES NFR BLD: 10.2 % (ref 18–48)
MCH RBC QN AUTO: 31.5 PG (ref 27–31)
MCHC RBC AUTO-ENTMCNC: 33.4 G/DL (ref 32–36)
MCV RBC AUTO: 94 FL (ref 82–98)
MONOCYTES # BLD AUTO: 1 K/UL (ref 0.3–1)
MONOCYTES NFR BLD: 7.4 % (ref 4–15)
NEUTROPHILS # BLD AUTO: 11 K/UL (ref 1.8–7.7)
NEUTROPHILS NFR BLD: 80 % (ref 38–73)
NRBC BLD-RTO: 0 /100 WBC
PLATELET # BLD AUTO: 236 K/UL (ref 150–450)
PMV BLD AUTO: 10.2 FL (ref 9.2–12.9)
RBC # BLD AUTO: 3.59 M/UL (ref 4–5.4)
WBC # BLD AUTO: 13.75 K/UL (ref 3.9–12.7)

## 2023-11-21 PROCEDURE — 3074F PR MOST RECENT SYSTOLIC BLOOD PRESSURE < 130 MM HG: ICD-10-PCS | Mod: CPTII,S$GLB,, | Performed by: OBSTETRICS & GYNECOLOGY

## 2023-11-21 PROCEDURE — 85025 COMPLETE CBC W/AUTO DIFF WBC: CPT | Performed by: OBSTETRICS & GYNECOLOGY

## 2023-11-21 PROCEDURE — 99214 PR OFFICE/OUTPT VISIT, EST, LEVL IV, 30-39 MIN: ICD-10-PCS | Mod: S$GLB,,, | Performed by: OBSTETRICS & GYNECOLOGY

## 2023-11-21 PROCEDURE — 1159F MED LIST DOCD IN RCRD: CPT | Mod: CPTII,S$GLB,, | Performed by: OBSTETRICS & GYNECOLOGY

## 2023-11-21 PROCEDURE — 99999 PR PBB SHADOW E&M-EST. PATIENT-LVL III: ICD-10-PCS | Mod: PBBFAC,,, | Performed by: OBSTETRICS & GYNECOLOGY

## 2023-11-21 PROCEDURE — 3078F PR MOST RECENT DIASTOLIC BLOOD PRESSURE < 80 MM HG: ICD-10-PCS | Mod: CPTII,S$GLB,, | Performed by: OBSTETRICS & GYNECOLOGY

## 2023-11-21 PROCEDURE — 99214 OFFICE O/P EST MOD 30 MIN: CPT | Mod: S$GLB,,, | Performed by: OBSTETRICS & GYNECOLOGY

## 2023-11-21 PROCEDURE — 76816 OB US FOLLOW-UP PER FETUS: CPT | Mod: S$GLB,,, | Performed by: OBSTETRICS & GYNECOLOGY

## 2023-11-21 PROCEDURE — 3078F DIAST BP <80 MM HG: CPT | Mod: CPTII,S$GLB,, | Performed by: OBSTETRICS & GYNECOLOGY

## 2023-11-21 PROCEDURE — 76817 US MFM PROCEDURE (VIEWPOINT): ICD-10-PCS | Mod: S$GLB,,, | Performed by: OBSTETRICS & GYNECOLOGY

## 2023-11-21 PROCEDURE — 1159F PR MEDICATION LIST DOCUMENTED IN MEDICAL RECORD: ICD-10-PCS | Mod: CPTII,S$GLB,, | Performed by: OBSTETRICS & GYNECOLOGY

## 2023-11-21 PROCEDURE — 3008F BODY MASS INDEX DOCD: CPT | Mod: CPTII,S$GLB,, | Performed by: OBSTETRICS & GYNECOLOGY

## 2023-11-21 PROCEDURE — 99999 PR PBB SHADOW E&M-EST. PATIENT-LVL III: CPT | Mod: PBBFAC,,, | Performed by: OBSTETRICS & GYNECOLOGY

## 2023-11-21 PROCEDURE — 76817 TRANSVAGINAL US OBSTETRIC: CPT | Mod: S$GLB,,, | Performed by: OBSTETRICS & GYNECOLOGY

## 2023-11-21 PROCEDURE — 3008F PR BODY MASS INDEX (BMI) DOCUMENTED: ICD-10-PCS | Mod: CPTII,S$GLB,, | Performed by: OBSTETRICS & GYNECOLOGY

## 2023-11-21 PROCEDURE — 76816 US MFM PROCEDURE (VIEWPOINT): ICD-10-PCS | Mod: S$GLB,,, | Performed by: OBSTETRICS & GYNECOLOGY

## 2023-11-21 PROCEDURE — 36415 COLL VENOUS BLD VENIPUNCTURE: CPT | Performed by: OBSTETRICS & GYNECOLOGY

## 2023-11-21 PROCEDURE — 3074F SYST BP LT 130 MM HG: CPT | Mod: CPTII,S$GLB,, | Performed by: OBSTETRICS & GYNECOLOGY

## 2023-11-21 NOTE — ASSESSMENT & PLAN NOTE
See prior notes for full counseling.     She had PGT-A which was normal. She has previously declined additional screening or diagnostic testing.    Recommendations:  Targeted anatomic survey completed today with no abnormalities visualized  Close monitoring for preeclampsia.  Fetal growth ultrasound at 32-34 weeks  Start weekly antepartum testing at 36 weeks' gestation  Consider delivery between 39 0/7 - 39 6/7 weeks' gestation

## 2023-11-21 NOTE — ASSESSMENT & PLAN NOTE
Anterior placenta previa is visualized with subchorionic hematoma extending from placenta edge, over internal os and through to the posterior aspect of the uterus. Hematoma appears smaller in size from prior ultrasound. Patient denies bright red vaginal bleeding today. Occasional maroon, dark spotting. Denies abdominal pain or ctx.     Patient counseled on pelvic rest and activity restrictions in the setting of suspected previa with bleeding.     Strict bleeding precautions were given. Report to nearest hospital with any reoccurrence of bright red vaginal bleeding for evaluation.     Recommendations:  Follow up ultrasound has been scheduled for 28 weeks for reevaluation of placental location and subchorionic hematoma

## 2023-11-21 NOTE — PROGRESS NOTES
Maternal Fetal Medicine follow up consult    SUBJECTIVE:     Annalise Nava is a 33 y.o.  female with IUP at 23w5d who is seen in follow up consultation by MFM.  Pregnancy complications include:   Problem   Placenta Previa Antepartum   Pregnancy Resulting From in Vitro Fertilization in Second Trimester   Chorioamnionitis in Third Trimester (Resolved)       Previous notes reviewed.   No changes to medical, surgical, family, social, or obstetric history.    Interval history since last MFM visit: doing well today. Denies bright red vaginal bleeding. Occasional maroon spotting over the last 2 days. Denies ctx, pelvic pressure.     Medications:  Current Outpatient Medications   Medication Instructions    aspirin (ECOTRIN) 81 mg, Oral, Daily    docusate sodium (COLACE) 100 mg, Oral, 2 times daily    doxycycline (VIBRAMYCIN) 100 mg, Oral, 2 times daily    estradioL (ESTRACE) 2 MG tablet 1 tablet, Oral, 2 times daily    imiquimod (ALDARA) 5 % cream Topical (Top), Three times weekly    multivitamin capsule 1 capsule, Oral, Daily    ondansetron (ZOFRAN) 4 mg, Oral, Every 6 hours PRN    prenatal 105-iron-folic ac-dha 30 mg iron- 1.4 mg-300 mg Cmpk Oral    progesterone (PROGESTERONE IN OIL) 50 mg/mL injection Inject 1 ml (50mg/ml) into the muscle daily, please send supplies for injection    progesterone (PROGESTERONE IN OIL) 50 mg/mL injection Inject 75 mg (1.5 mL) into the muscle daily, please send supplies for injection       Care team members:  Toby Chavez MD - Primary OB       OBJECTIVE:   /77 (BP Location: Left arm, Patient Position: Sitting, BP Method: Medium (Automatic))   Wt 80.8 kg (178 lb 2.1 oz)   BMI 27.90 kg/m²     Physical Exam    Ultrasound performed. See viewpoint for full ultrasound report.  Palacios live IUP.  The fetal anatomic survey was completed today and no fetal structural abnormalities were noted.   Interval fetal growth has been normal (EFW 51%, AC 51%).  Anterior placenta  previa is visualized. A subchorionic hematoma is present extending from placental edge over internal os to the posterior aspect of the uterus. Reduced size compared to prior ultrasound  MVP is normal.       ASSESSMENT/PLAN:     33 y.o.  female with IUP at 23w5d    Placenta previa antepartum  Anterior placenta previa is visualized with subchorionic hematoma extending from placenta edge, over internal os and through to the posterior aspect of the uterus. Hematoma appears smaller in size from prior ultrasound. Patient denies bright red vaginal bleeding today. Occasional maroon, dark spotting. Denies abdominal pain or ctx.     Patient counseled on pelvic rest and activity restrictions in the setting of suspected previa with bleeding.     Strict bleeding precautions were given. Report to nearest hospital with any reoccurrence of bright red vaginal bleeding for evaluation.     Recommendations:  Follow up ultrasound has been scheduled for 28 weeks for reevaluation of placental location and subchorionic hematoma    Pregnancy resulting from in vitro fertilization in second trimester  See prior notes for full counseling.     She had PGT-A which was normal. She has previously declined additional screening or diagnostic testing.    Recommendations:  Targeted anatomic survey completed today with no abnormalities visualized  Close monitoring for preeclampsia.  Fetal growth ultrasound at 32-34 weeks  Start weekly antepartum testing at 36 weeks' gestation  Consider delivery between 39 0/7 - 39 6/7 weeks' gestation, if resolution of placenta previa      RTC in 4 weeks for RTMD, growth and placental evaluation

## 2023-12-01 ENCOUNTER — LAB VISIT (OUTPATIENT)
Dept: LAB | Facility: HOSPITAL | Age: 33
End: 2023-12-01
Attending: SPECIALIST
Payer: COMMERCIAL

## 2023-12-01 DIAGNOSIS — Z13.1 SCREENING FOR DIABETES MELLITUS: ICD-10-CM

## 2023-12-01 DIAGNOSIS — Z34.82 PRENATAL CARE, SUBSEQUENT PREGNANCY, SECOND TRIMESTER: Primary | ICD-10-CM

## 2023-12-01 LAB
BASOPHILS # BLD AUTO: 0.04 K/UL (ref 0–0.2)
BASOPHILS NFR BLD: 0.3 % (ref 0–1.9)
BLD GP AB SCN CELLS X3 SERPL QL: NORMAL
DIFFERENTIAL METHOD BLD: ABNORMAL
EOSINOPHIL # BLD AUTO: 0.1 K/UL (ref 0–0.5)
EOSINOPHIL NFR BLD: 1.2 % (ref 0–8)
ERYTHROCYTE [DISTWIDTH] IN BLOOD BY AUTOMATED COUNT: 13.2 % (ref 11.5–14.5)
GLUCOSE SERPL-MCNC: 119 MG/DL (ref 70–140)
GLUCOSE SERPL-MCNC: 87 MG/DL (ref 70–110)
HCT VFR BLD AUTO: 32.9 % (ref 37–48.5)
HGB BLD-MCNC: 11.1 G/DL (ref 12–16)
IMM GRANULOCYTES # BLD AUTO: 0.17 K/UL (ref 0–0.04)
IMM GRANULOCYTES NFR BLD AUTO: 1.4 % (ref 0–0.5)
LYMPHOCYTES # BLD AUTO: 1 K/UL (ref 1–4.8)
LYMPHOCYTES NFR BLD: 8 % (ref 18–48)
MCH RBC QN AUTO: 31.5 PG (ref 27–31)
MCHC RBC AUTO-ENTMCNC: 33.7 G/DL (ref 32–36)
MCV RBC AUTO: 94 FL (ref 82–98)
MONOCYTES # BLD AUTO: 0.7 K/UL (ref 0.3–1)
MONOCYTES NFR BLD: 6 % (ref 4–15)
NEUTROPHILS # BLD AUTO: 10 K/UL (ref 1.8–7.7)
NEUTROPHILS NFR BLD: 83.1 % (ref 38–73)
NRBC BLD-RTO: 0 /100 WBC
PLATELET # BLD AUTO: 244 K/UL (ref 150–450)
PMV BLD AUTO: 9.9 FL (ref 9.2–12.9)
RBC # BLD AUTO: 3.52 M/UL (ref 4–5.4)
WBC # BLD AUTO: 12.07 K/UL (ref 3.9–12.7)

## 2023-12-01 PROCEDURE — 36415 COLL VENOUS BLD VENIPUNCTURE: CPT | Performed by: SPECIALIST

## 2023-12-01 PROCEDURE — 86850 RBC ANTIBODY SCREEN: CPT | Performed by: SPECIALIST

## 2023-12-01 PROCEDURE — 85025 COMPLETE CBC W/AUTO DIFF WBC: CPT | Performed by: SPECIALIST

## 2023-12-01 PROCEDURE — 82947 ASSAY GLUCOSE BLOOD QUANT: CPT | Performed by: SPECIALIST

## 2023-12-01 PROCEDURE — 82950 GLUCOSE TEST: CPT | Performed by: SPECIALIST

## 2023-12-21 ENCOUNTER — PROCEDURE VISIT (OUTPATIENT)
Dept: MATERNAL FETAL MEDICINE | Facility: CLINIC | Age: 33
End: 2023-12-21
Payer: COMMERCIAL

## 2023-12-21 ENCOUNTER — OFFICE VISIT (OUTPATIENT)
Dept: MATERNAL FETAL MEDICINE | Facility: CLINIC | Age: 33
End: 2023-12-21
Payer: COMMERCIAL

## 2023-12-21 VITALS
SYSTOLIC BLOOD PRESSURE: 122 MMHG | DIASTOLIC BLOOD PRESSURE: 79 MMHG | HEIGHT: 67 IN | WEIGHT: 183.19 LBS | BODY MASS INDEX: 28.75 KG/M2

## 2023-12-21 DIAGNOSIS — O20.9 VAGINAL BLEEDING AFFECTING EARLY PREGNANCY: Primary | ICD-10-CM

## 2023-12-21 DIAGNOSIS — O09.812 PREGNANCY RESULTING FROM IN VITRO FERTILIZATION IN SECOND TRIMESTER: ICD-10-CM

## 2023-12-21 DIAGNOSIS — O44.00 PLACENTA PREVIA ANTEPARTUM: ICD-10-CM

## 2023-12-21 DIAGNOSIS — Z36.89 ENCOUNTER FOR ULTRASOUND TO ASSESS FETAL GROWTH: ICD-10-CM

## 2023-12-21 PROCEDURE — 76817 US MFM PROCEDURE (VIEWPOINT): ICD-10-PCS | Mod: S$GLB,,, | Performed by: STUDENT IN AN ORGANIZED HEALTH CARE EDUCATION/TRAINING PROGRAM

## 2023-12-21 PROCEDURE — 76816 US MFM PROCEDURE (VIEWPOINT): ICD-10-PCS | Mod: S$GLB,,, | Performed by: STUDENT IN AN ORGANIZED HEALTH CARE EDUCATION/TRAINING PROGRAM

## 2023-12-21 PROCEDURE — 3074F PR MOST RECENT SYSTOLIC BLOOD PRESSURE < 130 MM HG: ICD-10-PCS | Mod: CPTII,S$GLB,, | Performed by: STUDENT IN AN ORGANIZED HEALTH CARE EDUCATION/TRAINING PROGRAM

## 2023-12-21 PROCEDURE — 99214 OFFICE O/P EST MOD 30 MIN: CPT | Mod: S$GLB,,, | Performed by: STUDENT IN AN ORGANIZED HEALTH CARE EDUCATION/TRAINING PROGRAM

## 2023-12-21 PROCEDURE — 76817 TRANSVAGINAL US OBSTETRIC: CPT | Mod: S$GLB,,, | Performed by: STUDENT IN AN ORGANIZED HEALTH CARE EDUCATION/TRAINING PROGRAM

## 2023-12-21 PROCEDURE — 1159F MED LIST DOCD IN RCRD: CPT | Mod: CPTII,S$GLB,, | Performed by: STUDENT IN AN ORGANIZED HEALTH CARE EDUCATION/TRAINING PROGRAM

## 2023-12-21 PROCEDURE — 76816 OB US FOLLOW-UP PER FETUS: CPT | Mod: S$GLB,,, | Performed by: STUDENT IN AN ORGANIZED HEALTH CARE EDUCATION/TRAINING PROGRAM

## 2023-12-21 PROCEDURE — 3008F BODY MASS INDEX DOCD: CPT | Mod: CPTII,S$GLB,, | Performed by: STUDENT IN AN ORGANIZED HEALTH CARE EDUCATION/TRAINING PROGRAM

## 2023-12-21 PROCEDURE — 99999 PR PBB SHADOW E&M-EST. PATIENT-LVL III: ICD-10-PCS | Mod: PBBFAC,,, | Performed by: STUDENT IN AN ORGANIZED HEALTH CARE EDUCATION/TRAINING PROGRAM

## 2023-12-21 PROCEDURE — 3074F SYST BP LT 130 MM HG: CPT | Mod: CPTII,S$GLB,, | Performed by: STUDENT IN AN ORGANIZED HEALTH CARE EDUCATION/TRAINING PROGRAM

## 2023-12-21 PROCEDURE — 1159F PR MEDICATION LIST DOCUMENTED IN MEDICAL RECORD: ICD-10-PCS | Mod: CPTII,S$GLB,, | Performed by: STUDENT IN AN ORGANIZED HEALTH CARE EDUCATION/TRAINING PROGRAM

## 2023-12-21 PROCEDURE — 99999 PR PBB SHADOW E&M-EST. PATIENT-LVL III: CPT | Mod: PBBFAC,,, | Performed by: STUDENT IN AN ORGANIZED HEALTH CARE EDUCATION/TRAINING PROGRAM

## 2023-12-21 PROCEDURE — 3078F DIAST BP <80 MM HG: CPT | Mod: CPTII,S$GLB,, | Performed by: STUDENT IN AN ORGANIZED HEALTH CARE EDUCATION/TRAINING PROGRAM

## 2023-12-21 PROCEDURE — 3008F PR BODY MASS INDEX (BMI) DOCUMENTED: ICD-10-PCS | Mod: CPTII,S$GLB,, | Performed by: STUDENT IN AN ORGANIZED HEALTH CARE EDUCATION/TRAINING PROGRAM

## 2023-12-21 PROCEDURE — 99214 PR OFFICE/OUTPT VISIT, EST, LEVL IV, 30-39 MIN: ICD-10-PCS | Mod: S$GLB,,, | Performed by: STUDENT IN AN ORGANIZED HEALTH CARE EDUCATION/TRAINING PROGRAM

## 2023-12-21 PROCEDURE — 3078F PR MOST RECENT DIASTOLIC BLOOD PRESSURE < 80 MM HG: ICD-10-PCS | Mod: CPTII,S$GLB,, | Performed by: STUDENT IN AN ORGANIZED HEALTH CARE EDUCATION/TRAINING PROGRAM

## 2023-12-21 NOTE — ASSESSMENT & PLAN NOTE
Patient with h/o bleeding in September with intermittent spotting after that, has not had spotting since last visit.  Subchorionic hematoma that was previously noted on ultrasound is now very small and unlikely to be clinically significant.      Recommendations:  Reviewed bleeding precautions  No further follow up needed for this finding

## 2023-12-21 NOTE — PROGRESS NOTES
"Maternal Fetal Medicine follow up consult    SUBJECTIVE:     Annalise Nava is a 33 y.o.  female with IUP at 28w0d who is seen in follow up consultation by MFM.  Pregnancy complications include:   Problem   Placenta Previa Antepartum   Vaginal Bleeding Affecting Early Pregnancy   Pregnancy Resulting From in Vitro Fertilization in Second Trimester       Previous notes reviewed.   No changes to medical, surgical, family, social, or obstetric history.    Interval history since last MFM visit: Patient states she is feeling well. Reports active fetal movement and denies contractions, vaginal bleeding, or leakage of fluid. No vaginal spotting since last visit. She has no complaints or concerns today.      Medications:  Current Outpatient Medications   Medication Instructions    aspirin (ECOTRIN) 81 mg, Oral, Daily    docusate sodium (COLACE) 100 mg, Oral, 2 times daily    doxycycline (VIBRAMYCIN) 100 mg, Oral, 2 times daily    estradioL (ESTRACE) 2 MG tablet 1 tablet, Oral, 2 times daily    imiquimod (ALDARA) 5 % cream Topical (Top), Three times weekly    multivitamin capsule 1 capsule, Oral, Daily    ondansetron (ZOFRAN) 4 mg, Oral, Every 6 hours PRN    prenatal 105-iron-folic ac-dha 30 mg iron- 1.4 mg-300 mg Cmpk Oral    progesterone (PROGESTERONE IN OIL) 50 mg/mL injection Inject 1 ml (50mg/ml) into the muscle daily, please send supplies for injection    progesterone (PROGESTERONE IN OIL) 50 mg/mL injection Inject 75 mg (1.5 mL) into the muscle daily, please send supplies for injection        OBJECTIVE:   /79 (BP Location: Left arm, Patient Position: Sitting)   Ht 5' 7" (1.702 m)   Wt 83.1 kg (183 lb 3.2 oz)   BMI 28.69 kg/m²     Ultrasound performed. See viewpoint for full ultrasound report.  Palacios live IUP  Fetal size is appropriate for gestational age, with the EFW (1199 g) plotting at the 50% and the AC plotting at the 38%.   A limited repeat fetal anatomic survey appears normal.   The MVP " is normal.   Transvaginal scan demonstrates that the placenta previa has resolved; the placenta no longer appears to be overlying the cervix. No evidence of vasa previa.   Previously noted subchorionic hematoma is now small/insignificant.      ASSESSMENT/PLAN:     33 y.o.  female with IUP at 28w0d    Pregnancy resulting from in vitro fertilization in second trimester  See prior notes for full counseling.     She had PGT-A which was normal. She has previously declined additional screening or diagnostic testing.    Recommendations:  Targeted anatomic survey completed with no abnormalities visualized  Close monitoring for preeclampsia.  Fetal growth ultrasound at 32-34 weeks  Start weekly antepartum testing at 36 weeks' gestation  Consider delivery between 39 0/7 - 39 6/7 weeks' gestation    Vaginal bleeding affecting early pregnancy  Patient with h/o bleeding in September with intermittent spotting after that, has not had spotting since last visit.  Subchorionic hematoma that was previously noted on ultrasound is now very small and unlikely to be clinically significant.      Recommendations:  Reviewed bleeding precautions  No further follow up needed for this finding    Placenta previa antepartum  Noted to be resolved on today's ultrasound.  Placenta not low-lying.    Recommendations:  No additional follow up is needed    FOLLOW-UP:  No additional follow up has been scheduled with M.  Patient will need growth ultrasound at 32-34 weeks and  surveillance starting at 36 weeks, but this can be arranged with primary OB.  We are happy to see the patient again if any additional problems or questions arise.       KURT Murguia MD  Maternal Fetal Medicine

## 2023-12-21 NOTE — ASSESSMENT & PLAN NOTE
See prior notes for full counseling.     She had PGT-A which was normal. She has previously declined additional screening or diagnostic testing.    Recommendations:  Targeted anatomic survey completed with no abnormalities visualized  Close monitoring for preeclampsia.  Fetal growth ultrasound at 32-34 weeks  Start weekly antepartum testing at 36 weeks' gestation  Consider delivery between 39 0/7 - 39 6/7 weeks' gestation

## 2023-12-21 NOTE — ASSESSMENT & PLAN NOTE
Noted to be resolved on today's ultrasound.  Placenta not low-lying.    Recommendations:  No additional follow up is needed

## 2024-02-07 LAB
C TRACH RRNA SPEC QL PROBE: NEGATIVE
GONORRHEA: NEGATIVE
PRENATAL STREP B CULTURE: NEGATIVE

## 2024-03-05 DIAGNOSIS — Z34.90 ENCOUNTER FOR ELECTIVE INDUCTION OF LABOR: Primary | ICD-10-CM

## 2024-03-14 ENCOUNTER — HOSPITAL ENCOUNTER (INPATIENT)
Facility: HOSPITAL | Age: 34
LOS: 1 days | Discharge: HOME | End: 2024-03-15
Attending: SPECIALIST | Admitting: SPECIALIST
Payer: COMMERCIAL

## 2024-03-14 ENCOUNTER — ANESTHESIA (OUTPATIENT)
Dept: OBSTETRICS AND GYNECOLOGY | Facility: HOSPITAL | Age: 34
End: 2024-03-14
Payer: COMMERCIAL

## 2024-03-14 ENCOUNTER — ANESTHESIA EVENT (OUTPATIENT)
Dept: OBSTETRICS AND GYNECOLOGY | Facility: HOSPITAL | Age: 34
End: 2024-03-14
Payer: COMMERCIAL

## 2024-03-14 DIAGNOSIS — Z34.90 ENCOUNTER FOR ELECTIVE INDUCTION OF LABOR: ICD-10-CM

## 2024-03-14 DIAGNOSIS — Z34.90 ENCOUNTER FOR INDUCTION OF LABOR: ICD-10-CM

## 2024-03-14 DIAGNOSIS — R58 HEMORRHAGE: Primary | ICD-10-CM

## 2024-03-14 LAB
ABO + RH BLD: NORMAL
AMPHET+METHAMPHET UR QL: NEGATIVE
BARBITURATES UR QL SCN>200 NG/ML: NEGATIVE
BASOPHILS # BLD AUTO: 0.06 K/UL (ref 0–0.2)
BASOPHILS NFR BLD: 0.5 % (ref 0–1.9)
BENZODIAZ UR QL SCN>200 NG/ML: NEGATIVE
BILIRUB UR QL STRIP: NEGATIVE
BLD GP AB SCN CELLS X3 SERPL QL: NORMAL
BUPRENORPHINE UR QL: NEGATIVE
BZE UR QL SCN: NEGATIVE
CANNABINOIDS UR QL SCN: NEGATIVE
CLARITY UR: CLEAR
COLOR UR: YELLOW
CREAT UR-MCNC: 64 MG/DL (ref 15–325)
DIFFERENTIAL METHOD BLD: ABNORMAL
EOSINOPHIL # BLD AUTO: 0.2 K/UL (ref 0–0.5)
EOSINOPHIL NFR BLD: 1.5 % (ref 0–8)
ERYTHROCYTE [DISTWIDTH] IN BLOOD BY AUTOMATED COUNT: 13.5 % (ref 11.5–14.5)
FENTANYL UR QL SCN: NORMAL
GLUCOSE UR QL STRIP: NEGATIVE
HCT VFR BLD AUTO: 35.1 % (ref 37–48.5)
HGB BLD-MCNC: 11.8 G/DL (ref 12–16)
HGB UR QL STRIP: NEGATIVE
IMM GRANULOCYTES # BLD AUTO: 0.25 K/UL (ref 0–0.04)
IMM GRANULOCYTES NFR BLD AUTO: 2.1 % (ref 0–0.5)
KETONES UR QL STRIP: ABNORMAL
LEUKOCYTE ESTERASE UR QL STRIP: NEGATIVE
LYMPHOCYTES # BLD AUTO: 1.2 K/UL (ref 1–4.8)
LYMPHOCYTES NFR BLD: 10.2 % (ref 18–48)
MCH RBC QN AUTO: 31.3 PG (ref 27–31)
MCHC RBC AUTO-ENTMCNC: 33.6 G/DL (ref 32–36)
MCV RBC AUTO: 93 FL (ref 82–98)
MONOCYTES # BLD AUTO: 1 K/UL (ref 0.3–1)
MONOCYTES NFR BLD: 8.5 % (ref 4–15)
NEUTROPHILS # BLD AUTO: 9.1 K/UL (ref 1.8–7.7)
NEUTROPHILS NFR BLD: 77.2 % (ref 38–73)
NITRITE UR QL STRIP: NEGATIVE
NRBC BLD-RTO: 0 /100 WBC
OPIATES UR QL SCN: NEGATIVE
PCP UR QL SCN>25 NG/ML: NEGATIVE
PH UR STRIP: 7 [PH] (ref 5–8)
PLATELET # BLD AUTO: 204 K/UL (ref 150–450)
PMV BLD AUTO: 10.6 FL (ref 9.2–12.9)
PROT UR QL STRIP: NEGATIVE
RBC # BLD AUTO: 3.77 M/UL (ref 4–5.4)
SP GR UR STRIP: 1.01 (ref 1–1.03)
TOXICOLOGY INFORMATION: NORMAL
URN SPEC COLLECT METH UR: ABNORMAL
UROBILINOGEN UR STRIP-ACNC: NEGATIVE EU/DL
WBC # BLD AUTO: 11.82 K/UL (ref 3.9–12.7)

## 2024-03-14 PROCEDURE — 63600175 PHARM REV CODE 636 W HCPCS: Performed by: SPECIALIST

## 2024-03-14 PROCEDURE — 0KQM0ZZ REPAIR PERINEUM MUSCLE, OPEN APPROACH: ICD-10-PCS | Performed by: SPECIALIST

## 2024-03-14 PROCEDURE — 63600175 PHARM REV CODE 636 W HCPCS: Performed by: ANESTHESIOLOGY

## 2024-03-14 PROCEDURE — 86592 SYPHILIS TEST NON-TREP QUAL: CPT | Performed by: SPECIALIST

## 2024-03-14 PROCEDURE — C1751 CATH, INF, PER/CENT/MIDLINE: HCPCS | Performed by: ANESTHESIOLOGY

## 2024-03-14 PROCEDURE — 25000003 PHARM REV CODE 250: Performed by: SPECIALIST

## 2024-03-14 PROCEDURE — 27200710 HC EPIDURAL INFUSION PUMP SET: Performed by: ANESTHESIOLOGY

## 2024-03-14 PROCEDURE — 81003 URINALYSIS AUTO W/O SCOPE: CPT | Mod: 59 | Performed by: SPECIALIST

## 2024-03-14 PROCEDURE — 72200005 HC VAGINAL DELIVERY LEVEL II

## 2024-03-14 PROCEDURE — 86850 RBC ANTIBODY SCREEN: CPT | Performed by: SPECIALIST

## 2024-03-14 PROCEDURE — 80348 DRUG SCREENING BUPRENORPHINE: CPT | Performed by: SPECIALIST

## 2024-03-14 PROCEDURE — 51702 INSERT TEMP BLADDER CATH: CPT

## 2024-03-14 PROCEDURE — 80354 DRUG SCREENING FENTANYL: CPT | Performed by: SPECIALIST

## 2024-03-14 PROCEDURE — 25000003 PHARM REV CODE 250: Performed by: ANESTHESIOLOGY

## 2024-03-14 PROCEDURE — 59409 OBSTETRICAL CARE: CPT | Mod: AA,,, | Performed by: ANESTHESIOLOGY

## 2024-03-14 PROCEDURE — 3E033VJ INTRODUCTION OF OTHER HORMONE INTO PERIPHERAL VEIN, PERCUTANEOUS APPROACH: ICD-10-PCS | Performed by: SPECIALIST

## 2024-03-14 PROCEDURE — 62326 NJX INTERLAMINAR LMBR/SAC: CPT | Performed by: ANESTHESIOLOGY

## 2024-03-14 PROCEDURE — 85025 COMPLETE CBC W/AUTO DIFF WBC: CPT | Performed by: SPECIALIST

## 2024-03-14 PROCEDURE — 12000002 HC ACUTE/MED SURGE SEMI-PRIVATE ROOM

## 2024-03-14 PROCEDURE — 10907ZC DRAINAGE OF AMNIOTIC FLUID, THERAPEUTIC FROM PRODUCTS OF CONCEPTION, VIA NATURAL OR ARTIFICIAL OPENING: ICD-10-PCS | Performed by: SPECIALIST

## 2024-03-14 PROCEDURE — 80307 DRUG TEST PRSMV CHEM ANLYZR: CPT | Performed by: SPECIALIST

## 2024-03-14 RX ORDER — PRENATAL WITH FERROUS FUM AND FOLIC ACID 3080; 920; 120; 400; 22; 1.84; 3; 20; 10; 1; 12; 200; 27; 25; 2 [IU]/1; [IU]/1; MG/1; [IU]/1; MG/1; MG/1; MG/1; MG/1; MG/1; MG/1; UG/1; MG/1; MG/1; MG/1; MG/1
1 TABLET ORAL DAILY
Status: DISCONTINUED | OUTPATIENT
Start: 2024-03-15 | End: 2024-03-16 | Stop reason: HOSPADM

## 2024-03-14 RX ORDER — MISOPROSTOL 200 UG/1
800 TABLET ORAL ONCE AS NEEDED
Status: DISCONTINUED | OUTPATIENT
Start: 2024-03-14 | End: 2024-03-14

## 2024-03-14 RX ORDER — EPHEDRINE SULFATE 50 MG/ML
5 INJECTION, SOLUTION INTRAVENOUS ONCE AS NEEDED
Status: DISCONTINUED | OUTPATIENT
Start: 2024-03-14 | End: 2024-03-14

## 2024-03-14 RX ORDER — AMOXICILLIN 250 MG
1 CAPSULE ORAL 2 TIMES DAILY PRN
Status: DISCONTINUED | OUTPATIENT
Start: 2024-03-14 | End: 2024-03-16 | Stop reason: HOSPADM

## 2024-03-14 RX ORDER — MISOPROSTOL 200 UG/1
800 TABLET ORAL ONCE AS NEEDED
Status: DISCONTINUED | OUTPATIENT
Start: 2024-03-14 | End: 2024-03-16 | Stop reason: HOSPADM

## 2024-03-14 RX ORDER — DIPHENHYDRAMINE HCL 25 MG
25 CAPSULE ORAL EVERY 4 HOURS PRN
Status: DISCONTINUED | OUTPATIENT
Start: 2024-03-14 | End: 2024-03-16 | Stop reason: HOSPADM

## 2024-03-14 RX ORDER — CARBOPROST TROMETHAMINE 250 UG/ML
250 INJECTION, SOLUTION INTRAMUSCULAR
Status: DISCONTINUED | OUTPATIENT
Start: 2024-03-14 | End: 2024-03-16 | Stop reason: HOSPADM

## 2024-03-14 RX ORDER — OXYTOCIN/RINGER'S LACTATE 30/500 ML
95 PLASTIC BAG, INJECTION (ML) INTRAVENOUS ONCE AS NEEDED
Status: DISCONTINUED | OUTPATIENT
Start: 2024-03-14 | End: 2024-03-16 | Stop reason: HOSPADM

## 2024-03-14 RX ORDER — OXYTOCIN/RINGER'S LACTATE 30/500 ML
95 PLASTIC BAG, INJECTION (ML) INTRAVENOUS ONCE AS NEEDED
Status: DISCONTINUED | OUTPATIENT
Start: 2024-03-14 | End: 2024-03-14

## 2024-03-14 RX ORDER — DIPHENOXYLATE HYDROCHLORIDE AND ATROPINE SULFATE 2.5; .025 MG/1; MG/1
2 TABLET ORAL EVERY 6 HOURS PRN
Status: DISCONTINUED | OUTPATIENT
Start: 2024-03-14 | End: 2024-03-16 | Stop reason: HOSPADM

## 2024-03-14 RX ORDER — TRANEXAMIC ACID 10 MG/ML
1000 INJECTION, SOLUTION INTRAVENOUS EVERY 30 MIN PRN
Status: DISCONTINUED | OUTPATIENT
Start: 2024-03-14 | End: 2024-03-16 | Stop reason: HOSPADM

## 2024-03-14 RX ORDER — MUPIROCIN 20 MG/G
OINTMENT TOPICAL 2 TIMES DAILY
Status: CANCELLED | OUTPATIENT
Start: 2024-03-14 | End: 2024-03-19

## 2024-03-14 RX ORDER — DOCUSATE SODIUM 100 MG/1
200 CAPSULE, LIQUID FILLED ORAL 2 TIMES DAILY
Status: DISCONTINUED | OUTPATIENT
Start: 2024-03-14 | End: 2024-03-16 | Stop reason: HOSPADM

## 2024-03-14 RX ORDER — CALCIUM CARBONATE 200(500)MG
500 TABLET,CHEWABLE ORAL 3 TIMES DAILY PRN
Status: DISCONTINUED | OUTPATIENT
Start: 2024-03-14 | End: 2024-03-14

## 2024-03-14 RX ORDER — OXYCODONE AND ACETAMINOPHEN 5; 325 MG/1; MG/1
1 TABLET ORAL EVERY 4 HOURS PRN
Status: DISCONTINUED | OUTPATIENT
Start: 2024-03-14 | End: 2024-03-16 | Stop reason: HOSPADM

## 2024-03-14 RX ORDER — ONDANSETRON HYDROCHLORIDE 2 MG/ML
4 INJECTION, SOLUTION INTRAVENOUS ONCE AS NEEDED
Status: COMPLETED | OUTPATIENT
Start: 2024-03-14 | End: 2024-03-14

## 2024-03-14 RX ORDER — CALCIUM CARBONATE 200(500)MG
1000 TABLET,CHEWABLE ORAL DAILY PRN
Status: DISCONTINUED | OUTPATIENT
Start: 2024-03-14 | End: 2024-03-14

## 2024-03-14 RX ORDER — DIPHENOXYLATE HYDROCHLORIDE AND ATROPINE SULFATE 2.5; .025 MG/1; MG/1
2 TABLET ORAL EVERY 6 HOURS PRN
Status: DISCONTINUED | OUTPATIENT
Start: 2024-03-14 | End: 2024-03-14

## 2024-03-14 RX ORDER — ADHESIVE BANDAGE
30 BANDAGE TOPICAL 2 TIMES DAILY PRN
Status: DISCONTINUED | OUTPATIENT
Start: 2024-03-15 | End: 2024-03-16 | Stop reason: HOSPADM

## 2024-03-14 RX ORDER — OXYTOCIN/RINGER'S LACTATE 30/500 ML
334 PLASTIC BAG, INJECTION (ML) INTRAVENOUS ONCE
Status: DISCONTINUED | OUTPATIENT
Start: 2024-03-14 | End: 2024-03-14

## 2024-03-14 RX ORDER — DIPHENHYDRAMINE HYDROCHLORIDE 50 MG/ML
12.5 INJECTION INTRAMUSCULAR; INTRAVENOUS EVERY 4 HOURS PRN
Status: DISCONTINUED | OUTPATIENT
Start: 2024-03-14 | End: 2024-03-14

## 2024-03-14 RX ORDER — NALOXONE HCL 0.4 MG/ML
0.4 VIAL (ML) INJECTION SEE ADMIN INSTRUCTIONS
Status: DISCONTINUED | OUTPATIENT
Start: 2024-03-14 | End: 2024-03-14

## 2024-03-14 RX ORDER — CARBOPROST TROMETHAMINE 250 UG/ML
250 INJECTION, SOLUTION INTRAMUSCULAR
Status: DISCONTINUED | OUTPATIENT
Start: 2024-03-14 | End: 2024-03-14

## 2024-03-14 RX ORDER — ONDANSETRON 4 MG/1
8 TABLET, ORALLY DISINTEGRATING ORAL EVERY 8 HOURS PRN
Status: DISCONTINUED | OUTPATIENT
Start: 2024-03-14 | End: 2024-03-14

## 2024-03-14 RX ORDER — METHYLERGONOVINE MALEATE 0.2 MG/ML
200 INJECTION INTRAVENOUS ONCE AS NEEDED
Status: DISCONTINUED | OUTPATIENT
Start: 2024-03-14 | End: 2024-03-14

## 2024-03-14 RX ORDER — METHYLERGONOVINE MALEATE 0.2 MG/ML
200 INJECTION INTRAVENOUS ONCE AS NEEDED
Status: DISCONTINUED | OUTPATIENT
Start: 2024-03-14 | End: 2024-03-16 | Stop reason: HOSPADM

## 2024-03-14 RX ORDER — OXYCODONE AND ACETAMINOPHEN 10; 325 MG/1; MG/1
1 TABLET ORAL EVERY 4 HOURS PRN
Status: DISCONTINUED | OUTPATIENT
Start: 2024-03-14 | End: 2024-03-16 | Stop reason: HOSPADM

## 2024-03-14 RX ORDER — TRANEXAMIC ACID 10 MG/ML
1000 INJECTION, SOLUTION INTRAVENOUS EVERY 30 MIN PRN
Status: DISCONTINUED | OUTPATIENT
Start: 2024-03-14 | End: 2024-03-14

## 2024-03-14 RX ORDER — LIDOCAINE HYDROCHLORIDE 10 MG/ML
10 INJECTION INFILTRATION; PERINEURAL ONCE AS NEEDED
Status: DISCONTINUED | OUTPATIENT
Start: 2024-03-14 | End: 2024-03-14

## 2024-03-14 RX ORDER — FENTANYL/BUPIVACAINE/NS/PF 2MCG/ML-.1
14 PLASTIC BAG, INJECTION (ML) INJECTION CONTINUOUS
Status: DISCONTINUED | OUTPATIENT
Start: 2024-03-14 | End: 2024-03-14

## 2024-03-14 RX ORDER — OXYTOCIN/RINGER'S LACTATE 30/500 ML
95 PLASTIC BAG, INJECTION (ML) INTRAVENOUS ONCE
Status: CANCELLED | OUTPATIENT
Start: 2024-03-14 | End: 2024-03-14

## 2024-03-14 RX ORDER — OXYTOCIN 10 [USP'U]/ML
10 INJECTION, SOLUTION INTRAMUSCULAR; INTRAVENOUS ONCE AS NEEDED
Status: DISCONTINUED | OUTPATIENT
Start: 2024-03-14 | End: 2024-03-14

## 2024-03-14 RX ORDER — OXYTOCIN 10 [USP'U]/ML
10 INJECTION, SOLUTION INTRAMUSCULAR; INTRAVENOUS ONCE AS NEEDED
Status: DISCONTINUED | OUTPATIENT
Start: 2024-03-14 | End: 2024-03-16 | Stop reason: HOSPADM

## 2024-03-14 RX ORDER — FENTANYL/BUPIVACAINE/NS/PF 2MCG/ML-.1
PLASTIC BAG, INJECTION (ML) INJECTION CONTINUOUS PRN
Status: DISCONTINUED | OUTPATIENT
Start: 2024-03-14 | End: 2024-03-14

## 2024-03-14 RX ORDER — OXYTOCIN/RINGER'S LACTATE 30/500 ML
0-30 PLASTIC BAG, INJECTION (ML) INTRAVENOUS CONTINUOUS
Status: DISCONTINUED | OUTPATIENT
Start: 2024-03-14 | End: 2024-03-14

## 2024-03-14 RX ORDER — ROPIVACAINE HYDROCHLORIDE 2 MG/ML
INJECTION, SOLUTION EPIDURAL; INFILTRATION
Status: COMPLETED | OUTPATIENT
Start: 2024-03-14 | End: 2024-03-14

## 2024-03-14 RX ORDER — OXYTOCIN/RINGER'S LACTATE 30/500 ML
334 PLASTIC BAG, INJECTION (ML) INTRAVENOUS ONCE AS NEEDED
Status: DISCONTINUED | OUTPATIENT
Start: 2024-03-14 | End: 2024-03-16 | Stop reason: HOSPADM

## 2024-03-14 RX ORDER — SODIUM CHLORIDE, SODIUM LACTATE, POTASSIUM CHLORIDE, CALCIUM CHLORIDE 600; 310; 30; 20 MG/100ML; MG/100ML; MG/100ML; MG/100ML
INJECTION, SOLUTION INTRAVENOUS CONTINUOUS
Status: DISCONTINUED | OUTPATIENT
Start: 2024-03-14 | End: 2024-03-14

## 2024-03-14 RX ORDER — SODIUM CHLORIDE 9 MG/ML
INJECTION, SOLUTION INTRAVENOUS
Status: DISCONTINUED | OUTPATIENT
Start: 2024-03-14 | End: 2024-03-14

## 2024-03-14 RX ORDER — ROPIVACAINE HYDROCHLORIDE 2 MG/ML
20 INJECTION, SOLUTION EPIDURAL; INFILTRATION ONCE AS NEEDED
Status: DISCONTINUED | OUTPATIENT
Start: 2024-03-14 | End: 2024-03-14

## 2024-03-14 RX ORDER — ONDANSETRON 4 MG/1
8 TABLET, ORALLY DISINTEGRATING ORAL EVERY 8 HOURS PRN
Status: DISCONTINUED | OUTPATIENT
Start: 2024-03-14 | End: 2024-03-16 | Stop reason: HOSPADM

## 2024-03-14 RX ORDER — SODIUM CHLORIDE 9 MG/ML
INJECTION, SOLUTION INTRAVENOUS CONTINUOUS
Status: CANCELLED | OUTPATIENT
Start: 2024-03-14

## 2024-03-14 RX ORDER — ACETAMINOPHEN 500 MG
500 TABLET ORAL EVERY 6 HOURS PRN
Status: DISCONTINUED | OUTPATIENT
Start: 2024-03-14 | End: 2024-03-14

## 2024-03-14 RX ORDER — OXYTOCIN/RINGER'S LACTATE 30/500 ML
334 PLASTIC BAG, INJECTION (ML) INTRAVENOUS ONCE AS NEEDED
Status: DISCONTINUED | OUTPATIENT
Start: 2024-03-14 | End: 2024-03-14

## 2024-03-14 RX ORDER — BISACODYL 10 MG/1
10 SUPPOSITORY RECTAL ONCE AS NEEDED
Status: DISCONTINUED | OUTPATIENT
Start: 2024-03-14 | End: 2024-03-16 | Stop reason: HOSPADM

## 2024-03-14 RX ADMIN — ONDANSETRON 4 MG: 2 INJECTION INTRAMUSCULAR; INTRAVENOUS at 04:03

## 2024-03-14 RX ADMIN — ACETAMINOPHEN 500 MG: 500 TABLET ORAL at 06:03

## 2024-03-14 RX ADMIN — SODIUM CHLORIDE, POTASSIUM CHLORIDE, SODIUM LACTATE AND CALCIUM CHLORIDE: 600; 310; 30; 20 INJECTION, SOLUTION INTRAVENOUS at 03:03

## 2024-03-14 RX ADMIN — Medication 14 ML/HR: at 10:03

## 2024-03-14 RX ADMIN — IBUPROFEN 600 MG: 200 TABLET, FILM COATED ORAL at 09:03

## 2024-03-14 RX ADMIN — ROPIVACAINE HYDROCHLORIDE 10 ML: 2 INJECTION, SOLUTION EPIDURAL; INFILTRATION; PERINEURAL at 09:03

## 2024-03-14 RX ADMIN — SODIUM CHLORIDE, POTASSIUM CHLORIDE, SODIUM LACTATE AND CALCIUM CHLORIDE: 600; 310; 30; 20 INJECTION, SOLUTION INTRAVENOUS at 09:03

## 2024-03-14 RX ADMIN — CALCIUM CARBONATE 500 MG: 500 TABLET, CHEWABLE ORAL at 03:03

## 2024-03-14 RX ADMIN — CALCIUM CARBONATE 1000 MG: 500 TABLET, CHEWABLE ORAL at 05:03

## 2024-03-14 RX ADMIN — Medication 2 MILLI-UNITS/MIN: at 05:03

## 2024-03-14 NOTE — PROGRESS NOTES
[facility]  Obstetrics  Labor Progress Note    Patient Name: Annalise Nava  MRN: [id number]  Admission Date: 3/14/2024  Hospital Length of Stay: 0 days  Attending Physician: Toby Chavez MD  Primary Care Provider: Ene Rene MD    Subjective:     Principal Problem:Encounter for induction of labor    Hospital Course:  No notes on file    Interval History:  Annalise is a 33 y.o.  at 40w0d. She is doing well.  Comfortable with epidural in place    Objective:     Vital Signs (Most Recent):  Temp: 98.6 °F (37 °C) (24 1231)  Pulse: 67 (24 1231)  Resp: 16 (24 1231)  BP: 113/69 (24 1231)  SpO2: 99 % (24 1229) Vital Signs (24h Range):  Temp:  [97.8 °F (36.6 °C)-98.6 °F (37 °C)] 98.6 °F (37 °C)  Pulse:  [] 67  Resp:  [16-17] 16  SpO2:  [97 %-100 %] 99 %  BP: (110-144)/(67-82) 113/69     Weight: 87.1 kg (192 lb)  Body mass index is 30.07 kg/m².    FHT:  Baseline 130s with good variability   TOCO:  Q 2-3 minutes    Cervical Exam:  Dilation:  4.5 cm  Effacement:  70%  Station: -3  Presentation: Vertex     Significant Labs:  Lab Results   Component Value Date    GROUPTRH A POS 2024    HEPBSAG Negative 2021    STREPBCULT negative 2024       I have personallly reviewed all pertinent lab results from the last 24 hours.    Physical Exam    Review of Systems  Assessment/Plan:     33 y.o. female  at 40w0d for:    * Encounter for induction of labor  IUP at 40 wga  IOL- on pitocin, progressing  Comfortable with epidural in place  Gbs -negative  Rh positive  Arom - clear          Vinh Chavez MD  Obstetrics  [facility]

## 2024-03-14 NOTE — NURSING
OBGYN LABS ENTERED INTO RESULTS CONSOLE      1st Trimester Labs Entered Into Results Console     [x] AOBRH   [] Rubella Immune   [x] RPR   [x] HBsAg   [x] HIV   [x] Chlamydia   [x] Gonorrhea   [] Cell-Free DNA   [x] Hep-C   [] Varicella    2nd Trimester Labs Entered Into Results Console     []OB Glucose Screen      3rd Trimester Labs Entered Into Results Console      [x] GBS   [] RPR    Drug Screen Entered Into Results Console     [x] Benzodiazes   [] Methadone   [x] Cocaine (Metab)   [x] Opiate   [x] Amphetamine   [x] Marijuana   [x] Creatinine   [x] Amphetamines-Beaker   [x] Barbituates-Beaker   [] Benzodiazepine-Beaker   [] Cannabinoids-Beaker   [x] Cocaine-Beaker   [x] Fentanyl-Beaker   [] MDMA-Beaker   [x] Opiates-Beaker   [x] Phencyclidine-Beaker        Results Entered by Farida Jaime RN    Results Verified for Manual Entry Accuracy by PRATIK Cobb RN

## 2024-03-14 NOTE — ANESTHESIA PREPROCEDURE EVALUATION
03/14/2024  Annalise Nava is a 33 y.o., female.      Pre-op Assessment    I have reviewed the Patient Summary Reports.     I have reviewed the Nursing Notes. I have reviewed the NPO Status.   I have reviewed the Medications.     Review of Systems  Anesthesia Hx:  No problems with previous Anesthesia   Neg history of prior surgery.          Denies Family Hx of Anesthesia complications.    Denies Personal Hx of Anesthesia complications.                    Social:  No Alcohol Use, Non-Smoker       Hematology/Oncology:  Hematology Normal   Oncology Normal                                   EENT/Dental:  EENT/Dental Normal           Cardiovascular:  Cardiovascular Normal                                            Pulmonary:  Pulmonary Normal                       Renal/:  Renal/ Normal                 Hepatic/GI:  Hepatic/GI Normal       IBS          Musculoskeletal:  Musculoskeletal Normal                Neurological:  Neurology Normal                                      Endocrine:  Endocrine Normal            Dermatological:  Skin Normal    Psych:  Psychiatric Normal                    Physical Exam  General: Well nourished and Alert    Airway:  Mallampati: II   Mouth Opening: Normal  TM Distance: Normal  Tongue: Normal  Neck ROM: Normal ROM    Dental:  Intact    Chest/Lungs:  Clear to auscultation, Normal Respiratory Rate    Heart:  Rate: Normal  Rhythm: Regular Rhythm  Sounds: Normal        Anesthesia Plan  Type of Anesthesia, risks & benefits discussed:    Anesthesia Type: Epidural  Intra-op Monitoring Plan: Standard ASA Monitors  Informed Consent: Informed consent signed with the Patient and all parties understand the risks and agree with anesthesia plan.  All questions answered.   ASA Score: 2    Ready For Surgery From Anesthesia Perspective.     .

## 2024-03-14 NOTE — PROGRESS NOTES
[facility]  Obstetrics  Labor Progress Note    Patient Name: Annalise Nava  MRN: [id number]  Admission Date: 3/14/2024  Hospital Length of Stay: 0 days  Attending Physician: Toby Chavez MD  Primary Care Provider: Ene Rene MD    Subjective:     Principal Problem:Encounter for induction of labor    Hospital Course:  No notes on file    Interval History:  Annalise is a 33 y.o.  at 40w0d. She is doing well. No complaints, mild contractions, active fetal movement    Objective:     Vital Signs (Most Recent):  Temp: 98.3 °F (36.8 °C) (24)  Pulse: 81 (24)  Resp: 17 (24)  BP: 119/68 (24)  SpO2: 97 % (24) Vital Signs (24h Range):  Temp:  [98.3 °F (36.8 °C)] 98.3 °F (36.8 °C)  Pulse:  [77-85] 81  Resp:  [16-17] 17  SpO2:  [97 %] 97 %  BP: (119-130)/(68-82) 119/68     Weight: 87.1 kg (192 lb)  Body mass index is 30.07 kg/m².    FHT: baseline 140's with good variability  TOCO:  Q 2-4 minutes    Cervical Exam:  Dilation:  3.5 cm  Effacement:  75%  Station: -3  Presentation: Vertex     Significant Labs:  Lab Results   Component Value Date    GROUPTRH A POS 2024    HEPBSAG Negative 2021    STREPBCULT negative 2024       CBC:   Recent Labs   Lab 24   WBC 11.82   RBC 3.77*   HGB 11.8*   HCT 35.1*      MCV 93   MCH 31.3*   MCHC 33.6     I have personallly reviewed all pertinent lab results from the last 24 hours.    Physical Exam  Gen- no distress  Uterus- gravid nontender  Review of Systems  Assessment/Plan:     33 y.o. female  at 40w0d for:    * Encounter for induction of labor  IUP at 40 wga  IOL- on pitocin  Epidural when desires  Gbs -negative  Rh positive  Arom - clear          Vinh Chavez MD  Obstetrics  [facility]

## 2024-03-14 NOTE — SUBJECTIVE & OBJECTIVE
Interval History:  Annalise is a 33 y.o.  at 40w0d. She is doing well. No complaints, mild contractions, active fetal movement    Objective:     Vital Signs (Most Recent):  Temp: 98.3 °F (36.8 °C) (24)  Pulse: 81 (24)  Resp: 17 (24)  BP: 119/68 (24)  SpO2: 97 % (24) Vital Signs (24h Range):  Temp:  [98.3 °F (36.8 °C)] 98.3 °F (36.8 °C)  Pulse:  [77-85] 81  Resp:  [16-17] 17  SpO2:  [97 %] 97 %  BP: (119-130)/(68-82) 119/68     Weight: 87.1 kg (192 lb)  Body mass index is 30.07 kg/m².    FHT: baseline 140's with good variability  TOCO:  Q 2-4 minutes    Cervical Exam:  Dilation:  3.5 cm  Effacement:  75%  Station: -3  Presentation: Vertex     Significant Labs:  Lab Results   Component Value Date    GROUPTRH A POS 2024    HEPBSAG Negative 2021    STREPBCULT negative 2024       CBC:   Recent Labs   Lab 24  0531   WBC 11.82   RBC 3.77*   HGB 11.8*   HCT 35.1*      MCV 93   MCH 31.3*   MCHC 33.6     I have personallly reviewed all pertinent lab results from the last 24 hours.    Physical Exam  Gen- no distress  Uterus- gravid nontender  Review of Systems

## 2024-03-14 NOTE — PLAN OF CARE
Problem: Adult Inpatient Plan of Care  Goal: Plan of Care Review  Outcome: Ongoing, Progressing  Goal: Patient-Specific Goal (Individualized)  Outcome: Ongoing, Progressing  Goal: Absence of Hospital-Acquired Illness or Injury  Outcome: Ongoing, Progressing  Goal: Optimal Comfort and Wellbeing  Outcome: Ongoing, Progressing  Goal: Readiness for Transition of Care  Outcome: Ongoing, Progressing     Problem:  Fall Injury Risk  Goal: Absence of Fall, Infant Drop and Related Injury  Outcome: Ongoing, Progressing     Problem: Infection  Goal: Absence of Infection Signs and Symptoms  Outcome: Ongoing, Progressing     Problem: Bleeding (Labor)  Goal: Hemostasis  Outcome: Ongoing, Progressing     Problem: Delayed Labor Progression (Labor)  Goal: Effective Progression to Delivery  Outcome: Ongoing, Progressing     Problem: Infection (Labor)  Goal: Absence of Infection Signs and Symptoms  Outcome: Ongoing, Progressing     Problem: Labor Pain (Labor)  Goal: Acceptable Pain Control  Outcome: Ongoing, Progressing     Problem: Uterine Tachysystole (Labor)  Goal: Normal Uterine Contraction Pattern  Outcome: Ongoing, Progressing

## 2024-03-14 NOTE — ANESTHESIA PROCEDURE NOTES
Epidural    Patient location during procedure: OB   Reason for block: primary anesthetic   Reason for block: labor analgesia requested by patient and obstetrician  Diagnosis: Intrauterine pregnancy   Start time: 3/14/2024 9:50 AM  Timeout: 3/14/2024 9:50 AM  End time: 3/14/2024 10:00 AM    Staffing  Performing Provider: Norman Santo Jr., MD  Authorizing Provider: Norman Santo Jr., MD    Staffing  Performed by: Norman Santo Jr., MD  Authorized by: Norman Santo Jr., MD        Preanesthetic Checklist  Completed: patient identified, IV checked, site marked, risks and benefits discussed, surgical consent, monitors and equipment checked, pre-op evaluation, timeout performed, anesthesia consent given, hand hygiene performed and patient being monitored  Preparation  Patient position: sitting  Prep: Betadine and ChloraPrep  Patient monitoring: ECG and Blood Pressure  Reason for block: primary anesthetic   Epidural  Skin Anesthetic: lidocaine 1%  Skin Wheal: 3 mL  Administration type: continuous  Approach: midline  Interspace: L3-4    Injection technique: VIRAL air  Needle and Epidural Catheter  Needle type: Tuohy   Needle gauge: 17  Needle length: 3.5 inches  Catheter type: springwound and multi-orifice  Catheter size: 19 G  Insertion Attempts: 1  Test dose: 3 mL of lidocaine 1.5% with Epi 1-to-200,000  Additional Documentation: incremental injection, negative aspiration for heme and CSF, no paresthesia on injection, no significant pain on injection, no significant complaints from patient and no signs/symptoms of IV or SA injection  Needle localization: anatomical landmarks  Assessment  Upper dermatomal levels - Left: T6  Right: T6   Dermatomal levels determined by pinch or prick  Ease of block: easy  Patient's tolerance of the procedure: no complaints and comfortable throughout block No inadvertent dural puncture with Tuohy.  Dural puncture not performed with spinal  needle    Medications:    Medications: ropivacaine (NAROPIN) solution 0.2% - Epidural   10 mL - 3/14/2024 9:48:00 AM

## 2024-03-14 NOTE — ASSESSMENT & PLAN NOTE
IUP at 40 wga  IOL- on pitocin, progressing  Comfortable with epidural in place  Gbs -negative  Rh positive  Arom - clear

## 2024-03-14 NOTE — NURSING
Nurses Note -- 4 Eyes      3/14/2024   5:28 AM      Skin assessed during: Admit      [] No Altered Skin Integrity Present    []Prevention Measures Documented      [] Yes- Altered Skin Integrity Present or Discovered   [] LDA Added if Not in Epic (Describe Wound)   [] New Altered Skin Integrity was Present on Admit and Documented in LDA   [] Wound Image Taken    Wound Care Consulted? No    Attending Nurse:  Paula Montes De Oca RN/Staff Member:   Bhumi OLMSTEAD

## 2024-03-14 NOTE — NURSING
OBGYN LABS ENTERED INTO RESULTS CONSOLE      1st Trimester Labs Entered Into Results Console     [] AOBRH   [] Rubella Immune   [] RPR   [] HBsAg   [] HIV   [] Chlamydia   [] Gonorrhea   [] Cell-Free DNA   [] Hep-C   [] Varicella    2nd Trimester Labs Entered Into Results Console     []OB Glucose Screen      3rd Trimester Labs Entered Into Results Console      [x] GBS   [] RPR    Drug Screen Entered Into Results Console     [] Benzodiazes   [] Methadone   [] Cocaine (Metab)   [] Opiate   [] Amphetamine   [] Marijuana   [] Creatinine   [] Amphetamines-Beaker   [] Barbituates-Beaker   [] Benzodiazepine-Beaker   [] Cannabinoids-Beaker   [] Cocaine-Beaker   [] Fentanyl-Beaker   [] MDMA-Beaker   [] Opiates-Beaker   [] Phencyclidine-Beaker        Results Entered by Paula Erwin RN    Results Verified for Manual Entry Accuracy by SHELTON Stuart RN

## 2024-03-14 NOTE — SUBJECTIVE & OBJECTIVE
Interval History:  Annalise is a 33 y.o.  at 40w0d. She is doing well.  Comfortable with epidural in place    Objective:     Vital Signs (Most Recent):  Temp: 98.6 °F (37 °C) (24 1231)  Pulse: 67 (24 1231)  Resp: 16 (24 1231)  BP: 113/69 (24 1231)  SpO2: 99 % (24 1229) Vital Signs (24h Range):  Temp:  [97.8 °F (36.6 °C)-98.6 °F (37 °C)] 98.6 °F (37 °C)  Pulse:  [] 67  Resp:  [16-17] 16  SpO2:  [97 %-100 %] 99 %  BP: (110-144)/(67-82) 113/69     Weight: 87.1 kg (192 lb)  Body mass index is 30.07 kg/m².    FHT:  Baseline 130s with good variability   TOCO:  Q 2-3 minutes    Cervical Exam:  Dilation:  4.5 cm  Effacement:  70%  Station: -3  Presentation: Vertex     Significant Labs:  Lab Results   Component Value Date    GROUPTRH A POS 2024    HEPBSAG Negative 2021    STREPBCULT negative 2024       I have personallly reviewed all pertinent lab results from the last 24 hours.    Physical Exam    Review of Systems

## 2024-03-15 VITALS
OXYGEN SATURATION: 96 % | TEMPERATURE: 98 F | DIASTOLIC BLOOD PRESSURE: 80 MMHG | RESPIRATION RATE: 17 BRPM | HEIGHT: 67 IN | BODY MASS INDEX: 30.13 KG/M2 | SYSTOLIC BLOOD PRESSURE: 129 MMHG | HEART RATE: 83 BPM | WEIGHT: 192 LBS

## 2024-03-15 LAB
BASOPHILS # BLD AUTO: 0.07 K/UL (ref 0–0.2)
BASOPHILS NFR BLD: 0.4 % (ref 0–1.9)
DIFFERENTIAL METHOD BLD: ABNORMAL
EOSINOPHIL # BLD AUTO: 0.1 K/UL (ref 0–0.5)
EOSINOPHIL NFR BLD: 0.7 % (ref 0–8)
ERYTHROCYTE [DISTWIDTH] IN BLOOD BY AUTOMATED COUNT: 13.4 % (ref 11.5–14.5)
HCT VFR BLD AUTO: 30.8 % (ref 37–48.5)
HGB BLD-MCNC: 10.2 G/DL (ref 12–16)
IMM GRANULOCYTES # BLD AUTO: 0.18 K/UL (ref 0–0.04)
IMM GRANULOCYTES NFR BLD AUTO: 1.2 % (ref 0–0.5)
LYMPHOCYTES # BLD AUTO: 0.9 K/UL (ref 1–4.8)
LYMPHOCYTES NFR BLD: 6 % (ref 18–48)
MCH RBC QN AUTO: 31.7 PG (ref 27–31)
MCHC RBC AUTO-ENTMCNC: 33.1 G/DL (ref 32–36)
MCV RBC AUTO: 96 FL (ref 82–98)
MONOCYTES # BLD AUTO: 1 K/UL (ref 0.3–1)
MONOCYTES NFR BLD: 6.1 % (ref 4–15)
NEUTROPHILS # BLD AUTO: 13.4 K/UL (ref 1.8–7.7)
NEUTROPHILS NFR BLD: 85.6 % (ref 38–73)
NRBC BLD-RTO: 0 /100 WBC
PLATELET # BLD AUTO: 170 K/UL (ref 150–450)
PMV BLD AUTO: 10.6 FL (ref 9.2–12.9)
RBC # BLD AUTO: 3.22 M/UL (ref 4–5.4)
RPR SER QL: NORMAL
WBC # BLD AUTO: 15.64 K/UL (ref 3.9–12.7)

## 2024-03-15 PROCEDURE — 36415 COLL VENOUS BLD VENIPUNCTURE: CPT | Performed by: SPECIALIST

## 2024-03-15 PROCEDURE — 85025 COMPLETE CBC W/AUTO DIFF WBC: CPT | Performed by: SPECIALIST

## 2024-03-15 PROCEDURE — 25000003 PHARM REV CODE 250: Performed by: SPECIALIST

## 2024-03-15 RX ORDER — IBUPROFEN 800 MG/1
800 TABLET ORAL EVERY 6 HOURS PRN
Qty: 30 TABLET | Refills: 0 | Status: SHIPPED | OUTPATIENT
Start: 2024-03-15 | End: 2024-05-24

## 2024-03-15 RX ORDER — OXYCODONE AND ACETAMINOPHEN 5; 325 MG/1; MG/1
1 TABLET ORAL EVERY 4 HOURS PRN
Qty: 15 TABLET | Refills: 0 | Status: SHIPPED | OUTPATIENT
Start: 2024-03-15 | End: 2024-05-24

## 2024-03-15 RX ADMIN — BENZOCAINE AND LEVOMENTHOL: 200; 5 SPRAY TOPICAL at 10:03

## 2024-03-15 RX ADMIN — OXYCODONE HYDROCHLORIDE AND ACETAMINOPHEN 1 TABLET: 5; 325 TABLET ORAL at 11:03

## 2024-03-15 RX ADMIN — BENZOCAINE AND LEVOMENTHOL: 200; 5 SPRAY TOPICAL at 12:03

## 2024-03-15 RX ADMIN — IBUPROFEN 600 MG: 200 TABLET, FILM COATED ORAL at 06:03

## 2024-03-15 RX ADMIN — PRENATAL VIT W/ FE FUMARATE-FA TAB 27-0.8 MG 1 TABLET: 27-0.8 TAB at 08:03

## 2024-03-15 RX ADMIN — DOCUSATE SODIUM 200 MG: 100 CAPSULE, LIQUID FILLED ORAL at 06:03

## 2024-03-15 NOTE — PLAN OF CARE
03/15/24 1432   Final Note   Assessment Type Final Discharge Note   Anticipated Discharge Disposition Home   What phone number can be called within the next 1-3 days to see how you are doing after discharge? [phone]   Post-Acute Status   Discharge Delays None known at this time     Discharge orders and chart reviewed with no further post-acute discharge needs identified at this time.  At this time, patient is cleared for discharge from Case Management standpoint.

## 2024-03-15 NOTE — LACTATION NOTE
03/15/24 1220   Maternal Assessment   Breast Density Bilateral:;soft   Areola Bilateral:;elastic   Nipples Bilateral:;everted   Left Nipple Symptoms redness;tender   Right Nipple Symptoms redness;tender   Maternal Infant Feeding   Maternal Emotional State assist needed   Infant Positioning clutch/football   Signs of Milk Transfer audible swallow;infant jaw motion present   Pain with Feeding no   Comfort Measures Before/During Feeding infant position adjusted;latch adjusted;maternal position adjusted   Latch Assistance yes     Assisted to latch baby to right breast in football position. Baby initially tongue thrusting with very disorganized suck. Baby latched deeply after numerous attempts, nursing well with audible swallows. Nipples are red and tender bilaterally. Mother has been having nipple pain with feedings, states pain decreased with deep latch. Provided hydrogel pads and emphasized importance of deep latch with every feeding to avoid further nipple damage. Reviewed basic breastfeeding instructions and encouraged to call me for any further breastfeeding assistance. Patient verbalizes understanding of all instructions with good recall.    Instructed on proper latch to facilitate effective breastfeeding.  Discussed recognizing hunger cues, appropriate positioning and wide mouth latch.  Discussed ways to determine an effective latch including:  areola included in latch, rhythmic/nutritive sucking and audible swallowing.  Also discussed soreness/tenderness associated with latch and prevention and treatment.  Pt states understanding and verbalized appropriate recall.

## 2024-03-15 NOTE — PLAN OF CARE
[facility]  Discharge Assessment    Primary Care Provider: Ene Rene MD   OB Screen completed and no needs identified at this time.  White board in room updated with contact information, and mother was encouraged to contact office if further needs arise.    OB Screen (most recent)       OB Screen - 03/15/24 0851          OB SCREEN    Assessment Type Discharge Planning Assessment     Source of Information patient     Received Prenatal Care Yes     Any indications/suspicions for None     Is this a teen pregnancy No     Is the baby in NICU No     Indication for adoption/Safe Haven No     Indication for DME/post-acute needs No     HIV (+) No     Any congenital  disorders No     Fetal demise/ death No

## 2024-03-15 NOTE — NURSING TRANSFER
Nursing Transfer Note      3/14/2024   9:38 PM    Nurse giving handoff: Travon Nix RN  Nurse receiving handoff:AYSHA Jordan    Reason patient is being transferred: Postpartum    Transfer To: 2121    Transfer via wheelchair    Transfer with Patient belongings, infant.    Transported by RN    Transfer Vital Signs:  Blood Pressure: 125/70  Heart Rate: 90  O2: 99  Temperature: 99.1F  Respirations: 16    Telemetry:   Order for Tele Monitor? No    Additional Lines: No    4eyes on Skin: yes    Medicines sent: No    Any special needs or follow-up needed: Normal PP care and FU    Patient belongings transferred with patient: Yes    Chart send with patient: Yes    Notified: spouse    Patient reassessed at: 2205 (date, time)  1  Upon arrival to floor: patient oriented to room, call bell in reach, and bed in lowest position

## 2024-03-15 NOTE — ANESTHESIA POSTPROCEDURE EVALUATION
Anesthesia Post Evaluation    Patient: Annalise Nava    Procedure(s) Performed: * No procedures listed *    Final Anesthesia Type: epidural      Patient location during evaluation: floor  Patient participation: Yes- Able to Participate  Level of consciousness: awake and alert  Post-procedure vital signs: reviewed and stable  Pain management: adequate  Airway patency: patent    PONV status at discharge: No PONV  Anesthetic complications: no      Cardiovascular status: blood pressure returned to baseline  Respiratory status: unassisted  Hydration status: euvolemic  Follow-up not needed.              Vitals Value Taken Time   /80 03/15/24 1620   Temp 36.8 °C (98.2 °F) 03/15/24 1620   Pulse 83 03/15/24 1620   Resp 17 03/15/24 1620   SpO2 96 % 03/15/24 1620         No case tracking events are documented in the log.      Pain/Fransisca Score: Pain Rating Prior to Med Admin: 5 (3/15/2024  6:10 AM)  Pain Rating Post Med Admin: 2 (3/14/2024 10:05 PM)  Fransisca Score: 10 (3/15/2024  7:57 AM)

## 2024-03-15 NOTE — DISCHARGE INSTRUCTIONS
FOLLOW UP WITH YOUR DOCTOR IN 6 WEEKS OR SOONER FOR ANY PROBLEMS.    Pelvic rest for 6 weeks (no sex, tampons, douching, nothing in the vagina)   You can experience vaginal bleeding on and off for up to 6 weeks, it will gradually get lighter and the color will change from bright red to a brownish discharge towards the end.     Activity:   NO strenuous activities or exercising. Do not /lift anything over 15 pounds. Do not do heavy housework or cleaning.   NO driving for 3 days. You may take short car trips but do not drive.   You may shower and/or soak in a bathtub, both are acceptable. Use a mild soap, no heavy perfumes or fragrances to avoid irritation.   If constipation develops: You may take Colace (stool softener), Milk of Magnesia, Dulcolax or Miralax. All of these medications are sold over the counter.     Care of Episiotomy:   Local agents such as Tucks pads & Dermoplast spray. You may also use a Sitz bath: sitting in a tub of warm water for 15 minutes 2-3 times per day will help relieve the discomfort.     Pain Relief:   You may take Motrin for mild pain & uterine cramping.     Emotional Changes:   You may experience baby blues after delivery. You may feel let down, anxious and cry easily. This is normal. These feelings can begin 2-3 days after delivery and usually disappear in about a week or two. Prolonged sadness may indicate postpartum depression.       ***SEEK IMMEDIATE HELP FROM THE EMERGENCY ROOM IF YOU HAVE ANY CHEST PAIN OR DIFFICULTY BREATHING.    Call your doctor for any of the following:   Problems with any of your medications, inability to eat.   Foul smelling vaginal discharge.   Temperature above 100.4.   Heavy vaginal bleeding. All women bleed different after delivery and each delivery is different. Heavy bleeding consists of saturating a zara pad in a 1 hour time period. Passing clots are normal, if you pass a blood clot larger than the size of a golf ball call your doctor's office.    If you experience pain in your legs/calves, if one leg increases in size and becomes swollen or becomes hot to touch or discolored.   Crying or periods of sadness beyond 2 weeks.     If you are breast feeding:   Wash your breasts with mild soap and warm water.   You should wear a supportive bra.   You should continue to take a prenatal vitamin for 6 weeks or until breastfeeding is discontinued.   If nipples are sore, apply a few drops of breast milk after nursing and let air dry or you can use Lanolin cream.   If breasts are engorged, apply warmth and express milk.   [facility] lactation consultant is available at [phone] for your breastfeeding needs.    If you are not breastfeeding:   Wear a tight bra and do not stimulate your breasts. Avoid handling your breasts and do not express milk. You may apply ice packs or cabbage leaves to relieve discomfort from engorgement. If your breasts become warm to touch, reddened or lumps develop call your doctor.                 Breastfeeding Discharge Instructions       [facility] Breastfeeding Support Services [phone]  AAP recommendation of exclusive breastfeeding for the first 6 months of life and continued breastfeeding with the introduction of supplemental foods beyond the first year of life.  Instructed on the recommendation to delay all bottle and pacifier use until after 4 weeks of age and breastfeeding is well established.  Discussed the benefits of exclusive breastfeeding for both mother and baby.  Discussed the risks of supplementation/pacifier use on the exclusivity of breastfeeding in the first 6 months. Feed the baby at the earliest sign of hunger or comfort  Hands to mouth, sucking motions  Rooting or searching for something to suck on  Dont wait for crying - it is a not a late sign of hunger; it is a sign of distress    The feedings may be 8-12 times per 24hrs and will not follow a schedule  Alternate the breast you start the feeding with, or  start with the breast that feels the fullest  Switch breasts when the baby takes himself off the breast or falls asleep  Keep offering breasts until the baby looks full, no longer gives hunger signs, and stays asleep when placed on his back in the crib  If the baby is sleepy and wont wake for a feeding, put the baby skin-to-skin dressed in a diaper against the mothers bare chest  Sleep near your baby  The baby should be positioned and latched on to the breast correctly  Chest-to-chest, chin in the breast  Babys lips are flipped outward  Babys mouth is stretched open wide like a shout  Babys sucking should feel like tugging to the mother  The baby should be drinking at the breast:  You should hear swallowing or gulping throughout the feeding  You should see milk on the babys lips when he comes off the breast  Your breasts should be softer when the baby is finished feeding  The baby should look relaxed at the end of feedings  After the 4th day and your milk is in:  The babys poop should turn bright yellow and be loose, watery, and seedy  The baby should have at least 3-4 poops the size of the palm of your hand per day  The baby should have at least 6-8 wet diapers per day  The urine should be light yellow in color  You should drink when you are thirsty and eat a healthy diet when you are    hungry.     Take naps to get the rest you need.   Take medications and/or drink alcohol only with permission of your obstetrician    or the babys pediatrician.  You can also call the [facility],   ([phone]), Monday-Friday, 8am-5pm Central time, to get the most   up-to-date evidence-based information on the use of medications during   pregnancy and breastfeeding.      The baby should be examined by a pediatrician at 3-5 days of age; unless ordered sooner by the pediatrician.  Once your milk comes in, the baby should be back to birth weight no later than 10-14 days of age.    If youre having problems with  breastfeeding or have any questions regarding breastfeeding- call [facility] Breastfeeding Support services [phone]

## 2024-03-15 NOTE — PLAN OF CARE
Problem: Adult Inpatient Plan of Care  Goal: Plan of Care Review  Outcome: Ongoing, Progressing  Goal: Patient-Specific Goal (Individualized)  Outcome: Ongoing, Progressing  Goal: Absence of Hospital-Acquired Illness or Injury  Outcome: Ongoing, Progressing  Goal: Optimal Comfort and Wellbeing  Outcome: Ongoing, Progressing  Goal: Readiness for Transition of Care  Outcome: Ongoing, Progressing     Problem:  Fall Injury Risk  Goal: Absence of Fall, Infant Drop and Related Injury  Outcome: Ongoing, Progressing     Problem: Infection  Goal: Absence of Infection Signs and Symptoms  Outcome: Ongoing, Progressing     Problem: Breastfeeding  Goal: Effective Breastfeeding  Outcome: Ongoing, Progressing     Problem: Adjustment to Role Transition (Postpartum Vaginal Delivery)  Goal: Successful Maternal Role Transition  Outcome: Ongoing, Progressing     Problem: Bleeding (Postpartum Vaginal Delivery)  Goal: Hemostasis  Outcome: Ongoing, Progressing     Problem: Infection (Postpartum Vaginal Delivery)  Goal: Absence of Infection Signs/Symptoms  Outcome: Ongoing, Progressing     Problem: Pain (Postpartum Vaginal Delivery)  Goal: Acceptable Pain Control  Outcome: Ongoing, Progressing     Problem: Urinary Retention (Postpartum Vaginal Delivery)  Goal: Effective Urinary Elimination  Outcome: Ongoing, Progressing      Appropriate for inpatient status per guidelines for vomiting due to ileus with possible aspiration and elevated troponin. Also hypoxic and placed on oxygen.        --------------  ADMISSION REVIEW     Payor: [employer] PPO  Subscriber #:  [id number] Appearance: Normal appearance. HENT:      Head: Normocephalic and atraumatic. Nose: Nose normal.      Mouth/Throat:      Mouth: Mucous membranes are moist.   Eyes:      Extraocular Movements: Extraocular movements intact.       Pupils: Pupils are equ Procalcitonin 0.42 (*)     All other components within normal limits    Narrative:     Resulted by: batch: K, CO2, CL, NA, ALB, TP, TBIL, ALT, AST, ALP, CA, CREA, BUN, GLUC,           Resulted by: [id number]: CTNI,    CBC W/ DIFFERENTIAL - Abnormal; Notable fo elevated troponin. Echo at that time revealed an ejection fraction of 15 to 20%. Case reviewed with Dr. Edis Cabrera, he recommends against heparin at this time. Cardiology to see patient once admitted.     3:37 am  Patient was cared for during the scheduled Pertinent positives and negatives noted in the HPI.     Physical Exam:    /71   Pulse 68   Temp 98.3 °F (36.8 °C) (Temporal)   Resp (!) 39   Wt 139 lb 15.9 oz (63.5 kg)   SpO2 97%   BMI 20.09 kg/m²   General: elderly, NAD, weak appearing  HEENT: Nor depletion  1. Gentle IVF  7. Hx of HFrEF s/p AICD  1. Hold Carvedilol given hypotension  8. afib  1. amiodarone  9. HTN  10. HLD  11. Hx of CVA w/ residual R sided deficits  12. Hx of prostate cancer         Ref.  Range 1/13/2021 00:41 1/13/2021 04:13 1/13/

## 2024-03-15 NOTE — L&D DELIVERY NOTE
[facility]  Vaginal Delivery   Operative Note    SUMMARY     Normal spontaneous vaginal delivery of live infant, was placed on mothers abdomen for skin to skin and bulb suctioning performed.  Infant delivered position OA over perineum.  No shoulder dystocia.  Nuchal cord: No.    Spontaneous delivery of placenta and IV pitocin given noting good uterine tone.  Uterine sweep reveals no retained products of conception.  2nd degree laceration noted and repaired in normal fashion with 2-0 chromic, rectovaginal exam within normal limits. .  Patient tolerated delivery well. Sponge needle and lap counted correctly x2.    Indications: Encounter for induction of labor  Pregnancy complicated by:   Patient Active Problem List   Diagnosis    Irritable bowel syndrome    Essential tremor    Family history of early CAD    Ingrown nail    Paronychia of great toe, right    Encounter for induction of labor    Vaginal bleeding affecting early pregnancy    Pregnancy resulting from in vitro fertilization in second trimester    Placenta previa antepartum     Admitting GA: 40w0d    Viable female infant with Apgar scores 9/9, weight pending     cc    Delivery Information for Girl Annalise Nava    Birth information:  YOB: 2026   Time of birth: 7:20 PM   Sex: female   Head Delivery Date/Time: 3/14/2024  7:20 PM   Delivery type: Vaginal, Spontaneous   Gestational Age: 40w0d        Delivery Providers    Delivering clinician: Toby Chavez MD   Provider Role    Travon Nix RN Registered Nurse    Chio Corcoran RN Registered Nurse    Maris Carroll, CST Scrub Person              Measurements    Weight:   Length:          Apgars    Living status: Living  Apgar Component Scores:  1 min.:  5 min.:  10 min.:  15 min.:  20 min.:    Skin color:         Heart rate:         Reflex irritability:         Muscle tone:         Respiratory effort:         Total:         Apgars assigned by: AYSHA PATEL         Operative  Delivery    Forceps attempted?: No  Vacuum extractor attempted?: No         Shoulder Dystocia    Shoulder dystocia present?: No           Presentation    Presentation: Vertex  Position: Middle Occiput Anterior           Interventions/Resuscitation    Method: Bulb Suctioning, Tactile Stimulation       Cord    Vessels: 3 vessels  Complications: None  Delayed Cord Clamping?: Yes  Cord Clamped Date/Time: 3/14/2024  7:21 PM  Cord Blood Disposition: Sent with Baby  Gases Sent?: No  Stem Cell Collection (by MD): No       Placenta    Placenta delivery date/time: 3/14/2024 1924  Placenta removal: Spontaneous  Placenta appearance: Intact  Placenta disposition: Discarded           Labor Events:       labor: No     Labor Onset Date/Time:         Dilation Complete Date/Time: 2024 19:05     Start Pushing Date/Time: 2024 19:14       Start Pushing Date/Time: 2024 19:14     Rupture Date/Time: 24         Rupture type: ARM (Artificial Rupture)         Fluid Amount:       Fluid Color: Clear               steroids: None     Antibiotics given for GBS: No     Induction: oxytocin;amniotomy     Indications for induction:  Elective     Augmentation:       Indications for augmentation:       Labor complications: None     Additional complications:          Cervical ripening:                     Delivery:      Episiotomy: None     Indication for Episiotomy:       Perineal Lacerations: 2nd Repaired:  Yes   Periurethral Laceration:   Repaired:     Labial Laceration:   Repaired:     Sulcus Laceration:   Repaired:     Vaginal Laceration:   Repaired:     Cervical Laceration:   Repaired:     Repair suture:       Repair # of packets: 1     Last Value - EBL - Nursing (mL):       Sum - EBL - Nursing (mL): 0     Last Value - EBL - Anesthesia (mL):      Calculated QBL (mL):       Running total QBL (mL):       Vaginal Sweep Performed: Yes     Surgicount Correct:         Other providers:       Anesthesia     Method: Epidural          Details (if applicable):  Trial of Labor      Categorization:      Priority:     Indications for :     Incision Type:       Additional  information:  Forceps:    Vacuum:    Breech:    Observed anomalies    Other (Comments):

## 2024-03-15 NOTE — DISCHARGE SUMMARY
[facility]  Obstetrics  Discharge Summary      Patient Name: Annalise Nava  MRN: [id number]  Admission Date: 3/14/2024  Hospital Length of Stay: 1 days  Discharge Date and Time:  03/15/2024 1:46 PM  Attending Physician: Toby Chavez MD   Discharging Provider: Vinh Chavez MD   Primary Care Provider: Ene Rene MD    HPI: No notes on file        * No surgery found *     Hospital Course:   No notes on file     33-year-old  3 para 1011 at 40 weeks gestational age admitted for elective induction of labor.  Patient progressed to complete dilation and delivered by spontaneous vaginal delivery, please see delivery note for details.  By postpartum day 1. Patient is requesting discharge to home.  Patient states pain well controlled, bleeding minimal, ambulating urinating without difficulty, has had bowel movement.  Physical exam on discharge significant for an abdomen which is soft and nontender, uterus firm below the umbilicus, lochia minimal, extremities without calf tenderness.    Final Active Diagnoses:    Diagnosis Date Noted POA    PRINCIPAL PROBLEM:  Encounter for induction of labor [Z34.90] 2022 Not Applicable      Problems Resolved During this Admission:        Significant Diagnostic Studies: Labs: CBC   Recent Labs   Lab 24  0531 03/15/24  0620   WBC 11.82 15.64*   HGB 11.8* 10.2*   HCT 35.1* 30.8*    170     Lab Results   Component Value Date    GROUPTRH A POS 2024         Feeding Method: both breast and bottle    Immunizations       Date Immunization Status Dose Route/Site Given by    24 MMR Incomplete 0.5 mL Subcutaneous/     24 Tdap Incomplete 0.5 mL Intramuscular/             Delivery:    Episiotomy: None   Lacerations: 2nd   Repair suture:     Repair # of packets: 1   Blood loss (ml):       Birth information:  YOB: 2026   Time of birth: 7:20 PM   Sex: female   Delivery type: Vaginal, Spontaneous  "  Gestational Age: 40w0d     Measurements    Weight: 3830 g  Weight (lbs): 8 lb 7.1 oz  Length: 55.2 cm  Length (in): 21.73"         Delivery Clinician: Delivery Providers    Delivering clinician: Toby Chavez MD   Provider Role    Travon Nix RN Registered Nurse    Chio Corcoran RN Registered Nurse    Maris Carroll, CST Scrub Person             Additional  information:  Forceps:    Vacuum:    Breech:    Observed anomalies      Living?:     Apgars    Living status: Living  Apgar Component Scores:  1 min.:  5 min.:  10 min.:  15 min.:  20 min.:    Skin color:  1  1       Heart rate:  2  2       Reflex irritability:  2  2       Muscle tone:  2  2       Respiratory effort:  2  2       Total:  9  9       Apgars assigned by: AYSHA PATEL         Placenta: Delivered:       appearance  Pending Diagnostic Studies:       None            Discharged Condition: good    Disposition: Home or Self Care    Follow Up:   Follow-up Information       Toby Chavez MD Follow up in 6 week(s).    Specialty: Obstetrics and Gynecology  Contact information:  [address] [place] [address]  [address] [place] [place] [facility] [address]  [phone]                           Patient Instructions:      Diet Adult Regular     Pelvic Rest     Activity as tolerated     Medications:  Current Discharge Medication List        START taking these medications    Details   ibuprofen (ADVIL,MOTRIN) 800 MG tablet Take 1 tablet (800 mg total) by mouth every 6 (six) hours as needed.  Qty: 30 tablet, Refills: 0      oxyCODONE-acetaminophen (PERCOCET) 5-325 mg per tablet Take 1 tablet by mouth every 4 (four) hours as needed.  Qty: 15 tablet, Refills: 0    Comments: n/a            CONTINUE these medications which have NOT CHANGED    Details   prenatal 105-iron-folic ac-dha 30 mg iron- 1.4 mg-300 mg Cmpk Take by mouth.           STOP taking these medications       aspirin (ECOTRIN) 81 MG EC tablet Comments:   Reason for Stopping:         multivitamin " capsule Comments:   Reason for Stopping:         imiquimod (ALDARA) 5 % cream Comments:   Reason for Stopping:         ondansetron (ZOFRAN) 4 MG tablet Comments:   Reason for Stopping:         progesterone (PROGESTERONE IN OIL) 50 mg/mL injection Comments:   Reason for Stopping:         progesterone (PROGESTERONE IN OIL) 50 mg/mL injection Comments:   Reason for Stopping:               Vinh Chavez MD  [facility]

## 2024-03-16 ENCOUNTER — PATIENT MESSAGE (OUTPATIENT)
Dept: OBSTETRICS AND GYNECOLOGY | Facility: HOSPITAL | Age: 34
End: 2024-03-16

## 2024-03-19 ENCOUNTER — PATIENT OUTREACH (OUTPATIENT)
Dept: ADMINISTRATIVE | Facility: CLINIC | Age: 34
End: 2024-03-19
Payer: COMMERCIAL

## 2024-05-16 ENCOUNTER — OFFICE VISIT (OUTPATIENT)
Dept: DERMATOLOGY | Facility: CLINIC | Age: 34
End: 2024-05-16
Payer: COMMERCIAL

## 2024-05-16 VITALS — BODY MASS INDEX: 26.68 KG/M2 | WEIGHT: 170 LBS | HEIGHT: 67 IN

## 2024-05-16 DIAGNOSIS — L30.9 DERMATITIS: Primary | ICD-10-CM

## 2024-05-16 DIAGNOSIS — D22.9 MULTIPLE BENIGN NEVI: ICD-10-CM

## 2024-05-16 DIAGNOSIS — D18.01 CHERRY ANGIOMA: ICD-10-CM

## 2024-05-16 DIAGNOSIS — L81.4 SOLAR LENTIGO: ICD-10-CM

## 2024-05-16 DIAGNOSIS — L82.1 SEBORRHEIC KERATOSES: ICD-10-CM

## 2024-05-16 DIAGNOSIS — L90.6 STRIAE PURPLE: ICD-10-CM

## 2024-05-16 PROCEDURE — 99214 OFFICE O/P EST MOD 30 MIN: CPT | Mod: S$GLB,,, | Performed by: DERMATOLOGY

## 2024-05-16 PROCEDURE — 1159F MED LIST DOCD IN RCRD: CPT | Mod: CPTII,S$GLB,, | Performed by: DERMATOLOGY

## 2024-05-16 PROCEDURE — 3008F BODY MASS INDEX DOCD: CPT | Mod: CPTII,S$GLB,, | Performed by: DERMATOLOGY

## 2024-05-16 PROCEDURE — 1160F RVW MEDS BY RX/DR IN RCRD: CPT | Mod: CPTII,S$GLB,, | Performed by: DERMATOLOGY

## 2024-05-16 RX ORDER — TAZAROTENE 1 MG/G
CREAM TOPICAL
Qty: 30 G | Refills: 1 | Status: SHIPPED | OUTPATIENT
Start: 2024-05-16

## 2024-05-16 RX ORDER — TRIAMCINOLONE ACETONIDE 1 MG/G
CREAM TOPICAL
Qty: 80 G | Refills: 3 | Status: SHIPPED | OUTPATIENT
Start: 2024-05-16

## 2024-05-16 NOTE — PROGRESS NOTES
"  Subjective:      Patient ID:  Annalise Nava is a 33 y.o. female who presents for   Chief Complaint   Patient presents with    Skin Check     TBSE    Spot     Left lower leg     LOV: 5/11/23 Condyloma     Skin check: TBSE    C/o spot on lower right leg, no complaints just looks "funky"    9 weeks post partum    Derm Hx:  No Phx NMSC  No Fhx MM    Current Outpatient Medications:   ·  ibuprofen (ADVIL,MOTRIN) 800 MG tablet, Take 1 tablet (800 mg total) by mouth every 6 (six) hours as needed., Disp: 30 tablet, Rfl: 0  ·  oxyCODONE-acetaminophen (PERCOCET) 5-325 mg per tablet, Take 1 tablet by mouth every 4 (four) hours as needed. (Patient not taking: Reported on 5/16/2024), Disp: 15 tablet, Rfl: 0  ·  prenatal 105-iron-folic ac-dha 30 mg iron- 1.4 mg-300 mg Cmpk, Take by mouth., Disp: , Rfl:         Review of Systems   Constitutional:  Negative for fever, chills and fatigue.   Respiratory:  Negative for cough and shortness of breath.    Skin:  Positive for daily sunscreen use and activity-related sunscreen use.       Objective:   Physical Exam   Constitutional: She appears well-developed and well-nourished. No distress.   Neurological: She is alert and oriented to person, place, and time. She is not disoriented.   Psychiatric: She has a normal mood and affect.   Skin:   Areas Examined (abnormalities noted in diagram):   Scalp / Hair Palpated and Inspected  Head / Face Inspection Performed  Neck Inspection Performed  Chest / Axilla Inspection Performed  Abdomen Inspection Performed  Genitals / Buttocks / Groin Inspection Performed  Back Inspection Performed  RUE Inspected  LUE Inspection Performed  RLE Inspected  LLE Inspection Performed  Nails and Digits Inspection Performed                     Diagram Legend     Erythematous scaling macule/papule c/w actinic keratosis       Vascular papule c/w angioma      Pigmented verrucoid papule/plaque c/w seborrheic keratosis      Yellow umbilicated papule c/w sebaceous " hyperplasia      Irregularly shaped tan macule c/w lentigo     1-2 mm smooth white papules consistent with Milia      Movable subcutaneous cyst with punctum c/w epidermal inclusion cyst      Subcutaneous movable cyst c/w pilar cyst      Firm pink to brown papule c/w dermatofibroma      Pedunculated fleshy papule(s) c/w skin tag(s)      Evenly pigmented macule c/w junctional nevus     Mildly variegated pigmented, slightly irregular-bordered macule c/w mildly atypical nevus      Flesh colored to evenly pigmented papule c/w intradermal nevus       Pink pearly papule/plaque c/w basal cell carcinoma      Erythematous hyperkeratotic cursted plaque c/w SCC      Surgical scar with no sign of skin cancer recurrence      Open and closed comedones      Inflammatory papules and pustules      Verrucoid papule consistent consistent with wart     Erythematous eczematous patches and plaques     Dystrophic onycholytic nail with subungual debris c/w onychomycosis     Umbilicated papule    Erythematous-base heme-crusted tan verrucoid plaque consistent with inflamed seborrheic keratosis     Erythematous Silvery Scaling Plaque c/w Psoriasis     See annotation      Assessment / Plan:        Dermatitis  -     triamcinolone acetonide 0.1% (KENALOG) 0.1 % cream; AAA bid  Dispense: 80 g; Refill: 3  Nummular, isolated patch, KOH neg  Trial of topical steroid    Striae purple  -     tazarotene (TAZORAC) 0.1 % cream; Aaa abdomen nightly  Dispense: 30 g; Refill: 1  Post partum 9 weeks  Abdominal  Not breastfeeding    - reviewed side effects of tretinoin including erythema, peeling/scaling, dryness, burning, pruritus, photosensitivity, temporary worsening of acne. Reviewed that skin irritation consisting of erythema and peeling may occur during the first month of treatment. If this occurs instructed to use moisturizer and decrease tretinoin use to 3 nights per week until side effects subside and then increase use to daily as tolerated. Pregnancy  must be avoided.    Cherry angioma  This is a benign vascular lesion. Reassurance given. No treatment required.     Multiple benign nevi  Careful dermoscopy evaluation of nevi performed with none identified as needing biopsy today  Monitor for new mole or moles that are becoming bigger, darker, irritated, or developing irregular borders.     Seborrheic keratoses  These are benign inherited growths without a malignant potential. Reassurance given to patient. No treatment is necessary.     Solar lentigo  This is a benign hyperpigmented sun induced lesion. Daily sun protection will reduce the number of new lesions. Treatment of these benign lesions are considered cosmetic.    Patient instructed in importance in daily broad spectrum sun protection of at least spf 30. Mineral sunscreen ingredients preferred (Zinc +/- Titanium) and can be found OTC.   Recommend Elta MD for daily use on face and neck.  Patient encouraged to wear hat for all outdoor exposure.   Also discussed sun avoidance and use of protective clothing.           No follow-ups on file.

## 2024-05-24 ENCOUNTER — OFFICE VISIT (OUTPATIENT)
Dept: PODIATRY | Facility: CLINIC | Age: 34
End: 2024-05-24
Payer: COMMERCIAL

## 2024-05-24 VITALS
BODY MASS INDEX: 26.68 KG/M2 | HEIGHT: 67 IN | DIASTOLIC BLOOD PRESSURE: 86 MMHG | RESPIRATION RATE: 16 BRPM | WEIGHT: 170 LBS | SYSTOLIC BLOOD PRESSURE: 126 MMHG | HEART RATE: 71 BPM

## 2024-05-24 DIAGNOSIS — L60.0 INGROWN NAIL OF GREAT TOE OF RIGHT FOOT: Primary | ICD-10-CM

## 2024-05-24 DIAGNOSIS — M79.674 GREAT TOE PAIN, RIGHT: ICD-10-CM

## 2024-05-24 PROCEDURE — 3074F SYST BP LT 130 MM HG: CPT | Mod: S$GLB,,, | Performed by: PODIATRIST

## 2024-05-24 PROCEDURE — 1160F RVW MEDS BY RX/DR IN RCRD: CPT | Mod: S$GLB,,, | Performed by: PODIATRIST

## 2024-05-24 PROCEDURE — 1159F MED LIST DOCD IN RCRD: CPT | Mod: S$GLB,,, | Performed by: PODIATRIST

## 2024-05-24 PROCEDURE — 99999 PR PBB SHADOW E&M-EST. PATIENT-LVL III: CPT | Mod: PBBFAC,,, | Performed by: PODIATRIST

## 2024-05-24 PROCEDURE — 3008F BODY MASS INDEX DOCD: CPT | Mod: S$GLB,,, | Performed by: PODIATRIST

## 2024-05-24 PROCEDURE — 99214 OFFICE O/P EST MOD 30 MIN: CPT | Mod: S$GLB,,, | Performed by: PODIATRIST

## 2024-05-24 PROCEDURE — 3079F DIAST BP 80-89 MM HG: CPT | Mod: S$GLB,,, | Performed by: PODIATRIST

## 2024-05-24 RX ORDER — SULFAMETHOXAZOLE AND TRIMETHOPRIM 800; 160 MG/1; MG/1
1 TABLET ORAL 2 TIMES DAILY
COMMUNITY
Start: 2024-01-08 | End: 2024-05-24 | Stop reason: ALTCHOICE

## 2024-05-24 RX ORDER — PANTOPRAZOLE SODIUM 40 MG/1
40 TABLET, DELAYED RELEASE ORAL
COMMUNITY
Start: 2024-01-02 | End: 2024-05-24

## 2024-05-24 RX ORDER — CEPHALEXIN 500 MG/1
500 CAPSULE ORAL 4 TIMES DAILY
COMMUNITY
Start: 2024-03-21 | End: 2024-05-24 | Stop reason: ALTCHOICE

## 2024-05-24 RX ORDER — NITROFURANTOIN 25; 75 MG/1; MG/1
CAPSULE ORAL
COMMUNITY
Start: 2024-01-25 | End: 2024-05-24

## 2024-05-24 RX ORDER — SULFAMETHOXAZOLE AND TRIMETHOPRIM 800; 160 MG/1; MG/1
1 TABLET ORAL 2 TIMES DAILY
Qty: 20 TABLET | Refills: 0 | Status: SHIPPED | OUTPATIENT
Start: 2024-05-24 | End: 2024-06-03

## 2024-05-26 NOTE — PROGRESS NOTES
Subjective:       Patient ID: Annalise Nava is a 33 y.o. female.    Chief Complaint: Ingrown Toenail and Toe Pain   Patient presents with concern regarding ingrown nail right great toe,  points to lateral right hallux starting to become very painful red and swollen.  She does relate some problems with the nail for a while.   Last week soaked it tried to lift the edge to release some of the pressure.  Use Dr. Alvarado's topical which softened it quite a bit then she tried to trim it.  Still very painful.  Denies drainage.  Pain level 5/10      Past Medical History:   Diagnosis Date    Infertility, female     IVF    Irritable bowel syndrome without diarrhea      History reviewed. No pertinent surgical history.  Family History   Problem Relation Name Age of Onset    Mitral valve prolapse Sister      Macular degeneration Maternal Aunt      Glaucoma Paternal Grandmother      Retinal detachment Neg Hx      Melanoma Neg Hx      Psoriasis Neg Hx      Lupus Neg Hx      Eczema Neg Hx       Social History     Socioeconomic History    Marital status:    Tobacco Use    Smoking status: Never    Smokeless tobacco: Never   Substance and Sexual Activity    Alcohol use: Not Currently     Comment: occasional    Drug use: No    Sexual activity: Yes     Partners: Male     Social Determinants of Health     Financial Resource Strain: Low Risk  (11/22/2021)    Overall Financial Resource Strain (CARDIA)     Difficulty of Paying Living Expenses: Not hard at all   Food Insecurity: No Food Insecurity (11/22/2021)    Hunger Vital Sign     Worried About Running Out of Food in the Last Year: Never true     Ran Out of Food in the Last Year: Never true   Transportation Needs: No Transportation Needs (11/22/2021)    PRAPARE - Transportation     Lack of Transportation (Medical): No     Lack of Transportation (Non-Medical): No   Physical Activity: Insufficiently Active (11/22/2021)    Exercise Vital Sign     Days of Exercise per Week: 3 days  "    Minutes of Exercise per Session: 20 min   Stress: No Stress Concern Present (11/22/2021)    [employer] [place] [employer] - Occupational Stress Questionnaire     Feeling of Stress : Not at all   Housing Stability: Unknown (11/22/2021)    Housing Stability Vital Sign     Unable to Pay for Housing in the Last Year: No     Unstable Housing in the Last Year: No       Current Outpatient Medications   Medication Sig Dispense Refill    tazarotene (TAZORAC) 0.1 % cream Aaa abdomen nightly 30 g 1    triamcinolone acetonide 0.1% (KENALOG) 0.1 % cream AAA bid 80 g 3    sulfamethoxazole-trimethoprim 800-160mg (BACTRIM DS) 800-160 mg Tab Take 1 tablet by mouth 2 (two) times daily. for 10 days 20 tablet 0     No current facility-administered medications for this visit.     Review of patient's allergies indicates:  No Known Allergies    Review of Systems   All other systems reviewed and are negative.      Objective:      Vitals:    05/24/24 0931   BP: 126/86   Pulse: 71   Resp: 16   Weight: 77.1 kg (170 lb)   Height: 5' 7" (1.702 m)     Physical Exam  Vitals and nursing note reviewed.   Cardiovascular:      Pulses:           Dorsalis pedis pulses are 2+ on the right side and 2+ on the left side.        Posterior tibial pulses are 2+ on the right side and 2+ on the left side.   Musculoskeletal:         General: Tenderness present.      Right foot: No deformity.      Left foot: No deformity.   Feet:      Right foot:      Skin integrity: Erythema (very tender ingrown nail lateral right hallux with localized edema, erythema, no calor or drainage, white superficial fungus of the nail plate) present.      Toenail Condition: Right toenails are ingrown.   Skin:     Capillary Refill: Capillary refill takes less than 2 seconds.   Neurological:      General: No focal deficit present.                    Assessment:       1. Ingrown nail of great toe of right foot    2. Great toe pain, right        Plan:          BACTRIM 800 MG " B.I.D. TIMES 10 DAYS    We reviewed some mild fungal changes to the nail and how this would be cared for and treated after resolving ingrown nail issue.  Advised patient best topical after treatment would be to apply Vicks vapor rub twice daily  Reviewed signs of infection to monitor for regarding any progression of infection along the skin fold  Instructed patient to soaked 2-3 times daily if able.  After morning soak apply antibiotic ointment and a light gauze or Coban bandage, soft dressing rather than Band-Aid.  Leave on until late afternoon or evening soak in Epson salt, then leave uncovered, can soak 3rd time before bed and just rub a little antibiotic ointment into the corner, all evening and when sleeping it should be left uncovered  Continue this same topical treatment daily to resolve pain redness swelling and to facilitate the nail to grow out  We discussed oral antibiotic for infection  Just had a baby and is not nursing  Started on Bactrim  Once pain-free would recommend soaking every couple of days but absolutely must continue topical to make sure the nail has grown out completely which can take 4-8 weeks.  Apply Vicks vapor rub twice daily  Reviewed care and maintenance, proper trimming of nail to try to prevent recurrence  Patient was in understanding and agreement with treatment plan.  I counseled the patient on their conditions, implications and medical management.  Instructed patient to contact the office with any changes, questions, concerns, worsening of symptoms.   Total face to face time 20 minutes, exam, assessment, treatment, discussion, additional time for review of chart prior to and following appointment and visit documentation, consultation and coordination of care.   Follow up as needed    This note was created using [employer] voice recognition software that occasionally misinterpreted phrases or words.

## 2024-07-09 ENCOUNTER — PATIENT MESSAGE (OUTPATIENT)
Dept: ADMINISTRATIVE | Facility: HOSPITAL | Age: 34
End: 2024-07-09
Payer: COMMERCIAL

## 2024-09-10 ENCOUNTER — OFFICE VISIT (OUTPATIENT)
Dept: OPTOMETRY | Facility: CLINIC | Age: 34
End: 2024-09-10
Payer: COMMERCIAL

## 2024-09-10 DIAGNOSIS — Z01.00 EXAMINATION OF EYES AND VISION: Primary | ICD-10-CM

## 2024-09-10 DIAGNOSIS — H52.7 REFRACTIVE ERROR: ICD-10-CM

## 2024-09-10 DIAGNOSIS — Z46.0 CONTACT LENS/GLASSES FITTING: Primary | ICD-10-CM

## 2024-09-10 PROCEDURE — 99999 PR PBB SHADOW E&M-EST. PATIENT-LVL II: CPT | Mod: PBBFAC,,, | Performed by: OPTOMETRIST

## 2024-09-10 PROCEDURE — 99999 PR PBB SHADOW E&M-EST. PATIENT-LVL I: CPT | Mod: PBBFAC,,, | Performed by: OPTOMETRIST

## 2024-09-10 PROCEDURE — 92310 CONTACT LENS FITTING OU: CPT | Mod: CSM,,, | Performed by: OPTOMETRIST

## 2024-09-10 RX ORDER — IBUPROFEN 800 MG/1
800 TABLET ORAL 3 TIMES DAILY
COMMUNITY

## 2024-09-10 NOTE — PROGRESS NOTES
HPI    Pt here today for ocular health exam.      States no visual changes or   complaints but would like updated rx today.    Happy with current contacts -- wears Acuvue 1 Day Moist OU.    Only wears cls socially but thinks will wear more this year.    Denies any headaches or eye pain.    (-) allergies / dry eyes  (-) gtts  (-) floaters or light flashes  Last edited by Anabel Brennan on 9/10/2024  2:48 PM.            Assessment /Plan     For exam results, see Encounter Report.    Examination of eyes and vision    Refractive error      1. Examination of eyes and vision  Good ocular health    2. Refractive error  Dispensed updated spectacle Rx, mild change from previous    Discussed cls options -- occasional wearer  Dispensed CLs Rx: B&L Infuse. Daily wear only, dispose of daily.   Discussed proper hand hygiene and wear/care of lenses. Do not sleep/swim/shower in lenses.   Discontinue CL wear ASAP and RTC if any redness or discomfort occurs.

## 2024-09-10 NOTE — PROGRESS NOTES
Assessment /Plan     For exam results, see Encounter Report.    Contact lens/glasses fitting      Patient is here for a comprehensive eye exam and contact lens fit. See other exam visit with same encounter date 09/10/2024 for detailed exam information.

## 2024-10-15 ENCOUNTER — LAB VISIT (OUTPATIENT)
Dept: LAB | Facility: HOSPITAL | Age: 34
End: 2024-10-15
Attending: SPECIALIST
Payer: COMMERCIAL

## 2024-10-15 DIAGNOSIS — N91.2 ABSENCE OF MENSTRUATION: Primary | ICD-10-CM

## 2024-10-15 LAB — HCG INTACT+B SERPL-ACNC: 61.16 MIU/ML

## 2024-10-15 PROCEDURE — 36415 COLL VENOUS BLD VENIPUNCTURE: CPT | Performed by: SPECIALIST

## 2024-10-15 PROCEDURE — 84702 CHORIONIC GONADOTROPIN TEST: CPT | Performed by: SPECIALIST

## 2024-10-17 ENCOUNTER — LAB VISIT (OUTPATIENT)
Dept: LAB | Facility: HOSPITAL | Age: 34
End: 2024-10-17
Attending: SPECIALIST
Payer: COMMERCIAL

## 2024-10-17 DIAGNOSIS — N91.2 ABSENCE OF MENSTRUATION: Primary | ICD-10-CM

## 2024-10-17 LAB — HCG INTACT+B SERPL-ACNC: 63.15 MIU/ML

## 2024-10-17 PROCEDURE — 84702 CHORIONIC GONADOTROPIN TEST: CPT | Performed by: SPECIALIST

## 2024-10-17 PROCEDURE — 36415 COLL VENOUS BLD VENIPUNCTURE: CPT | Performed by: SPECIALIST

## 2024-10-17 PROCEDURE — 84144 ASSAY OF PROGESTERONE: CPT | Performed by: SPECIALIST

## 2024-10-18 LAB — PROGEST SERPL-MCNC: 4.3 NG/ML

## 2024-11-29 ENCOUNTER — PATIENT MESSAGE (OUTPATIENT)
Dept: OPTOMETRY | Facility: CLINIC | Age: 34
End: 2024-11-29
Payer: COMMERCIAL

## 2024-12-01 ENCOUNTER — OFFICE VISIT (OUTPATIENT)
Dept: URGENT CARE | Facility: CLINIC | Age: 34
End: 2024-12-01
Payer: COMMERCIAL

## 2024-12-01 VITALS
HEART RATE: 80 BPM | BODY MASS INDEX: 26.37 KG/M2 | TEMPERATURE: 98 F | HEIGHT: 67 IN | SYSTOLIC BLOOD PRESSURE: 126 MMHG | RESPIRATION RATE: 18 BRPM | WEIGHT: 168 LBS | OXYGEN SATURATION: 98 % | DIASTOLIC BLOOD PRESSURE: 81 MMHG

## 2024-12-01 DIAGNOSIS — H01.002 BLEPHARITIS OF RIGHT LOWER EYELID, UNSPECIFIED TYPE: ICD-10-CM

## 2024-12-01 DIAGNOSIS — H00.012 HORDEOLUM EXTERNUM OF RIGHT LOWER EYELID: Primary | ICD-10-CM

## 2024-12-01 PROCEDURE — 99213 OFFICE O/P EST LOW 20 MIN: CPT | Mod: S$GLB,,, | Performed by: NURSE PRACTITIONER

## 2024-12-01 RX ORDER — ERYTHROMYCIN 5 MG/G
OINTMENT OPHTHALMIC
COMMUNITY
Start: 2024-11-29

## 2024-12-01 RX ORDER — CEPHALEXIN 500 MG/1
500 CAPSULE ORAL 3 TIMES DAILY
Qty: 21 CAPSULE | Refills: 0 | Status: SHIPPED | OUTPATIENT
Start: 2024-12-01 | End: 2024-12-08

## 2024-12-01 NOTE — PATIENT INSTRUCTIONS
May continue to use erythromycin ointment.  Avoid contact with eyes and perform good hand hygiene.   Do not wear contacts.  Follow up with Eye Doctor tomorrow for further evaluation and possible I and D as discussed.

## 2024-12-01 NOTE — PROGRESS NOTES
"Subjective:       Patient ID: Annalise Nava is a 34 y.o. female.    Vitals:  height is 5' 7" (1.702 m) and weight is 76.2 kg (168 lb). Her oral temperature is 98.4 °F (36.9 °C). Her blood pressure is 126/81 and her pulse is 80. Her respiration is 18 and oxygen saturation is 98%.     Chief Complaint: Stye    Margaret is a 34 y.o. female who presents today with a chief complaint of stye right eye that has worsened over the past 5 days.  She now reports a pustular area to right lower eyelid with moderate swelling redness and pain. She's using erythromycin eye ointment, hot compresses with little improvement.      Patient presents with:  Stye           Constitution: Negative.   Eyes:  Positive for eyelid swelling. Negative for vision loss, double vision and blurred vision.           Objective:      Physical Exam   Constitutional: She is oriented to person, place, and time. She appears well-developed. She is cooperative.   HENT:   Head: Normocephalic and atraumatic.   Ears:   Right Ear: Hearing, tympanic membrane, external ear and ear canal normal.   Left Ear: Hearing, tympanic membrane, external ear and ear canal normal.   Nose: Nose normal. No mucosal edema or nasal deformity. No epistaxis. Right sinus exhibits no maxillary sinus tenderness and no frontal sinus tenderness. Left sinus exhibits no maxillary sinus tenderness and no frontal sinus tenderness.   Mouth/Throat: Uvula is midline, oropharynx is clear and moist and mucous membranes are normal. No trismus in the jaw. Normal dentition. No uvula swelling.   Eyes: Conjunctivae and lids are normal. Right eye exhibits hordeolum (pustular area noted to right lower eyelid with moderate erythema swelling and pain.  Areas surrounding pinpoint pustular is firm with no fluctuance.). vision grossly intact   Neck: Trachea normal and phonation normal. Neck supple.   Cardiovascular: Normal rate, regular rhythm, normal heart sounds and normal pulses.   Pulmonary/Chest: Effort " normal and breath sounds normal.   Abdominal: Normal appearance and bowel sounds are normal. Soft.   Musculoskeletal: Normal range of motion.         General: Normal range of motion.   Neurological: She is alert and oriented to person, place, and time. She exhibits normal muscle tone.   Skin: Skin is warm, dry and intact.   Psychiatric: Her speech is normal and behavior is normal. Judgment and thought content normal.   Nursing note and vitals reviewed.        Past medical history and current medications reviewed.       Assessment:           1. Hordeolum externum of right lower eyelid    2. Blepharitis of right lower eyelid, unspecified type              Plan:         Hordeolum externum of right lower eyelid    Blepharitis of right lower eyelid, unspecified type  -     cephALEXin (KEFLEX) 500 MG capsule; Take 1 capsule (500 mg total) by mouth 3 (three) times daily. for 7 days  Dispense: 21 capsule; Refill: 0             Patient Instructions   May continue to use erythromycin ointment.  Avoid contact with eyes and perform good hand hygiene.   Do not wear contacts.  Follow up with Eye Doctor tomorrow for further evaluation and possible I and D as discussed.           Ahsan Lopez, ESTER-C         Medical Decision Making:   Urgent Care Management:  Patient is 6 weeks pregnant but given significance of cellulitis to right lower eyelid benefit versus risk discussed and patient will start Keflex.  Patient is also using erythromycin ointment with no improvement.  Patient will follow up with eye doctor tomorrow morning for further evaluation and possible I&D.

## 2024-12-02 ENCOUNTER — LAB VISIT (OUTPATIENT)
Dept: LAB | Facility: HOSPITAL | Age: 34
End: 2024-12-02
Attending: SPECIALIST
Payer: COMMERCIAL

## 2024-12-02 DIAGNOSIS — Z32.00 PREGNANCY EXAMINATION OR TEST, PREGNANCY UNCONFIRMED: Primary | ICD-10-CM

## 2024-12-02 LAB — HCG INTACT+B SERPL-ACNC: NORMAL MIU/ML

## 2024-12-02 PROCEDURE — 84702 CHORIONIC GONADOTROPIN TEST: CPT | Performed by: SPECIALIST

## 2024-12-02 PROCEDURE — 36415 COLL VENOUS BLD VENIPUNCTURE: CPT | Performed by: SPECIALIST

## 2024-12-02 PROCEDURE — 84144 ASSAY OF PROGESTERONE: CPT | Performed by: SPECIALIST

## 2024-12-03 LAB — PROGEST SERPL-MCNC: 15 NG/ML

## 2024-12-04 ENCOUNTER — LAB VISIT (OUTPATIENT)
Dept: LAB | Facility: HOSPITAL | Age: 34
End: 2024-12-04
Attending: SPECIALIST
Payer: COMMERCIAL

## 2024-12-04 DIAGNOSIS — Z32.00 PREGNANCY EXAMINATION OR TEST, PREGNANCY UNCONFIRMED: Primary | ICD-10-CM

## 2024-12-04 LAB — HCG INTACT+B SERPL-ACNC: NORMAL MIU/ML

## 2024-12-04 PROCEDURE — 84702 CHORIONIC GONADOTROPIN TEST: CPT | Performed by: SPECIALIST

## 2024-12-04 PROCEDURE — 84144 ASSAY OF PROGESTERONE: CPT | Performed by: SPECIALIST

## 2024-12-04 PROCEDURE — 36415 COLL VENOUS BLD VENIPUNCTURE: CPT | Performed by: SPECIALIST

## 2024-12-05 LAB — PROGEST SERPL-MCNC: 23.2 NG/ML

## 2024-12-09 ENCOUNTER — LAB VISIT (OUTPATIENT)
Dept: LAB | Facility: HOSPITAL | Age: 34
End: 2024-12-09
Attending: SPECIALIST
Payer: COMMERCIAL

## 2024-12-09 DIAGNOSIS — N91.2 ABSENCE OF MENSTRUATION: Primary | ICD-10-CM

## 2024-12-09 LAB
HCG INTACT+B SERPL-ACNC: NORMAL MIU/ML
PROGEST SERPL-MCNC: 17 NG/ML

## 2024-12-09 PROCEDURE — 36415 COLL VENOUS BLD VENIPUNCTURE: CPT | Performed by: SPECIALIST

## 2024-12-09 PROCEDURE — 84144 ASSAY OF PROGESTERONE: CPT | Performed by: SPECIALIST

## 2024-12-09 PROCEDURE — 84702 CHORIONIC GONADOTROPIN TEST: CPT | Performed by: SPECIALIST

## 2024-12-10 LAB
ABO + RH BLD: NORMAL
ANTIBODY SCREEN: NEGATIVE
C TRACH RRNA SPEC QL PROBE: NEGATIVE
HBV SURFACE AG SERPL QL IA: NEGATIVE
HCV AB SERPL QL IA: NON REACTIVE
HIV 1+2 AB+HIV1 P24 AG SERPL QL IA: NON REACTIVE
N GONORRHOEAE, AMPLIFIED DNA: NEGATIVE
RPR: NON REACTIVE
RUBELLA IMMUNE STATUS: NORMAL

## 2025-04-28 ENCOUNTER — LAB VISIT (OUTPATIENT)
Dept: LAB | Facility: HOSPITAL | Age: 35
End: 2025-04-28
Attending: SPECIALIST
Payer: COMMERCIAL

## 2025-04-28 DIAGNOSIS — Z34.02 ENCOUNTER FOR SUPERVISION OF NORMAL FIRST PREGNANCY IN SECOND TRIMESTER: Primary | ICD-10-CM

## 2025-04-28 LAB
ABSOLUTE EOSINOPHIL (SMH): 0.17 K/UL
ABSOLUTE MONOCYTE (SMH): 0.69 K/UL (ref 0.3–1)
ABSOLUTE NEUTROPHIL COUNT (SMH): 8.9 K/UL (ref 1.8–7.7)
BASOPHILS # BLD AUTO: 0.06 K/UL
BASOPHILS NFR BLD AUTO: 0.5 %
ERYTHROCYTE [DISTWIDTH] IN BLOOD BY AUTOMATED COUNT: 13.5 % (ref 11.5–14.5)
GLUCOSE 1H P 100 G GLC PO SERPL-MCNC: 126 MG/DL (ref 60–450)
GLUCOSE SERPL-MCNC: 89 MG/DL (ref 70–94)
GLUCOSE, FASTING: 89 MG/DL
HCT VFR BLD AUTO: 32.1 % (ref 37–48.5)
HGB BLD-MCNC: 10.8 GM/DL (ref 12–16)
IMM GRANULOCYTES # BLD AUTO: 0.15 K/UL (ref 0–0.04)
IMM GRANULOCYTES NFR BLD AUTO: 1.3 % (ref 0–0.5)
INDIRECT COOMBS: NORMAL
LYMPHOCYTES # BLD AUTO: 1.27 K/UL (ref 1–4.8)
MCH RBC QN AUTO: 31.1 PG (ref 27–31)
MCHC RBC AUTO-ENTMCNC: 33.6 G/DL (ref 32–36)
MCV RBC AUTO: 93 FL (ref 82–98)
NUCLEATED RBC (/100WBC) (SMH): 0 /100 WBC
PLATELET # BLD AUTO: 221 K/UL (ref 150–450)
PMV BLD AUTO: 10.4 FL (ref 9.2–12.9)
RBC # BLD AUTO: 3.47 M/UL (ref 4–5.4)
RELATIVE EOSINOPHIL (SMH): 1.5 % (ref 0–8)
RELATIVE LYMPHOCYTE (SMH): 11.3 % (ref 18–48)
RELATIVE MONOCYTE (SMH): 6.2 % (ref 4–15)
RELATIVE NEUTROPHIL (SMH): 79.2 % (ref 38–73)
WBC # BLD AUTO: 11.2 K/UL (ref 3.9–12.7)

## 2025-04-28 PROCEDURE — 85025 COMPLETE CBC W/AUTO DIFF WBC: CPT

## 2025-04-28 PROCEDURE — 36415 COLL VENOUS BLD VENIPUNCTURE: CPT

## 2025-04-28 PROCEDURE — 82947 ASSAY GLUCOSE BLOOD QUANT: CPT

## 2025-04-28 PROCEDURE — 82950 GLUCOSE TEST: CPT

## 2025-04-28 PROCEDURE — 86850 RBC ANTIBODY SCREEN: CPT | Performed by: SPECIALIST

## 2025-06-06 LAB — PRENATAL STREP B CULTURE: NEGATIVE

## 2025-06-20 DIAGNOSIS — O09.529 AMA (ADVANCED MATERNAL AGE) MULTIGRAVIDA 35+: Primary | ICD-10-CM

## 2025-06-24 ENCOUNTER — HOSPITAL ENCOUNTER (OUTPATIENT)
Facility: HOSPITAL | Age: 35
Discharge: HOME | End: 2025-06-24
Attending: SPECIALIST | Admitting: SPECIALIST
Payer: COMMERCIAL

## 2025-06-24 VITALS — HEART RATE: 90 BPM | SYSTOLIC BLOOD PRESSURE: 128 MMHG | DIASTOLIC BLOOD PRESSURE: 59 MMHG

## 2025-06-24 DIAGNOSIS — O09.529 AMA (ADVANCED MATERNAL AGE) MULTIGRAVIDA 35+: ICD-10-CM

## 2025-06-24 PROCEDURE — 59025 FETAL NON-STRESS TEST: CPT

## 2025-06-24 NOTE — DISCHARGE INSTRUCTIONS
Keep your scheduled appointment with your provider.    Call your Doctor if you have any of the following:  Temperature above 100 degrees  Nausea, vomiting and/or diarrhea  Severe headache, dizziness, or blurred vision  Notable increase in swelling of hands or feet  Notable swelling of face and lips  Difficulty, pain or burning with urination  Foul smelling vaginal discharge  Decreased fetal movement    Come to the hospital if you have any of the following symptoms:  Your water breaks  More than 4-6 contractions in 1 hour for 2 or more hours  Vaginal bleeding like a period    After 28 weeks, you should feel 10 distinct fetal movements within a 2 hour period.    It is recommended that you drink 1/2 a gallon of water each day.  Tea, Soda and Juice are  in addition to this.    Labor and Delivery Phone number: [phone]    If you need to be seen on Labor and delivery between the hours of 6pm and 7am, please enter through the Emergency room entrance.

## 2025-06-27 ENCOUNTER — HOSPITAL ENCOUNTER (OUTPATIENT)
Facility: HOSPITAL | Age: 35
Discharge: HOME | End: 2025-06-27
Attending: SPECIALIST | Admitting: SPECIALIST
Payer: COMMERCIAL

## 2025-06-27 VITALS — DIASTOLIC BLOOD PRESSURE: 58 MMHG | RESPIRATION RATE: 16 BRPM | HEART RATE: 80 BPM | SYSTOLIC BLOOD PRESSURE: 124 MMHG

## 2025-06-27 DIAGNOSIS — O09.529 AMA (ADVANCED MATERNAL AGE) MULTIGRAVIDA 35+: ICD-10-CM

## 2025-06-27 PROCEDURE — 59025 FETAL NON-STRESS TEST: CPT

## 2025-06-27 NOTE — DISCHARGE INSTRUCTIONS
Keep your scheduled appointment with your provider.    Call your Doctor if you have any of the following:  Temperature above 100 degrees  Nausea, vomiting and/or diarrhea  Severe headache, dizziness, or blurred vision  Notable increase in swelling of hands or feet  Notable swelling of face and lips  Difficulty, pain or burning with urination  Foul smelling vaginal discharge  Decreased fetal movement    Come to the hospital if you have any of the following symptoms:  Your water breaks  More than 4-6 contractions in 1 hour for 2 or more hours  Vaginal bleeding like a period    After 28 weeks, you should feel 10 distinct fetal movements within a 2 hour period.    It is recommended that you drink 1/2 a gallon of water each day.  Tea, Soda and Juice are  in addition to this.    Labor and Delivery Phone number: [phone]    If you need to be seen on Labor and delivery between the hours of 6pm and 7am, please enter through the Emergency room entrance.

## 2025-07-02 ENCOUNTER — HOSPITAL ENCOUNTER (OUTPATIENT)
Facility: HOSPITAL | Age: 35
Discharge: HOME | End: 2025-07-02
Attending: SPECIALIST | Admitting: SPECIALIST
Payer: COMMERCIAL

## 2025-07-02 VITALS — DIASTOLIC BLOOD PRESSURE: 64 MMHG | HEART RATE: 89 BPM | RESPIRATION RATE: 18 BRPM | SYSTOLIC BLOOD PRESSURE: 119 MMHG

## 2025-07-02 DIAGNOSIS — O09.529 AMA (ADVANCED MATERNAL AGE) MULTIGRAVIDA 35+: ICD-10-CM

## 2025-07-02 PROCEDURE — 59025 FETAL NON-STRESS TEST: CPT

## 2025-07-04 ENCOUNTER — HOSPITAL ENCOUNTER (OUTPATIENT)
Facility: HOSPITAL | Age: 35
Discharge: HOME | End: 2025-07-05
Attending: SPECIALIST | Admitting: SPECIALIST
Payer: COMMERCIAL

## 2025-07-04 VITALS
HEART RATE: 86 BPM | SYSTOLIC BLOOD PRESSURE: 122 MMHG | DIASTOLIC BLOOD PRESSURE: 63 MMHG | OXYGEN SATURATION: 97 % | RESPIRATION RATE: 16 BRPM

## 2025-07-04 DIAGNOSIS — R10.9 ABDOMINAL PAIN: Primary | ICD-10-CM

## 2025-07-05 PROCEDURE — 99211 OFF/OP EST MAY X REQ PHY/QHP: CPT

## 2025-07-05 NOTE — NURSING
[facility]  Department of Obstetrics and Gynecology  Labor & Delivery Triage Assessment    PATIENT NAME: Annalise Nava  MRN: [id number]  TODAY'S DATE: 2025    CHIEF COMPLAINT: Contractions      OB History    Para Term  AB Living   4 2 2 0 1 2   SAB IAB Ectopic Multiple Live Births   1 0 0 0 2      # Outcome Date GA Lbr Wallace/2nd Weight Sex Type Anes PTL Lv   4 Current            3 Term 24 40w0d 11:41 / 00:15 3.83 kg (8 lb 7.1 oz) F Vag-Spont EPI N BETITO      Name: PRESTON,MARCELA RYDER      Apgar1: 9  Apgar5: 9   2 Term 22 40w3d 24:30 / 01:47 3.29 kg (7 lb 4.1 oz) F Vag-Spont EPI N BETITO      Name: PRESTON,MARCELA RYDER      Apgar1: 8  Apgar5: 9   1 SAB 2020 8w0d            Past Medical History:   Diagnosis Date    Infertility, female     IVF    Irritable bowel syndrome without diarrhea      History reviewed. No pertinent surgical history.      VITAL SIGNS - ABNORMAL VITALS INCLUDE TEMP >100.4,RR <12 or >26, SUSTAINED MATERNAL PULSE <60 or >120     VITAL SIGNS (Most Recent)  Pulse: 86 (250)  Resp: 16 (25)  BP: 122/63 (25)  SpO2: 97 % (25)    VITAL SIGNS     normal  HEADACHE    no     VOMITING    no  VISUAL DISTURBANCES  no  EPIGASTRIC PAIN        no  PROTEINURIA 2+ or MORE             no   EDEMA FACE/EXTREMITIES            no    FETAL MOVEMENT     FETAL MOVEMENT: Active  FETAL HEART RATE BASELINE =  130  normal  FETAL HEART RATE VARIABILITY:  Moderate  FETAL HEART RATE ACCELERATIONS FOR GESTATIONAL AGE: present  FETAL HEART RATE DECELERATIONS: none    ABDOMINAL PAIN/CRAMPING/CONTRACTIONS     Patient is complaining of abdominal pain/cramping/contractions. Contraction pattern is Irregular, greater than 5 minutes apart. Contraction strength is mild. Resting tone is relaxed. Abdominal palpation is non-tender.    RUPTURE OF MEMBRANES OR LEAKING OF AMNIOTIC FLUID     Patient denies ROM or leaking of amniotic fluid.    VAGINAL BLEEDING      Patient denies vaginal bleeding.    VAGINAL EXAM     DILATION:  1  STATION:  -3  EFFACEMENT:  50  PRESENTATION:  Cephalic      PAIN PRESENT ON ARRIVAL     ONSET:   2100  LOCATION:  Abdomen, lower back  PAIN SCALE (0-10):  4  DESCRIPTION: cramping    Interventions     None.      PATIENT DISPOSITION     Discharged Home      Dr. Campa notified at 0000 of the above assessment.    Lauren Hill RN  [facility]  07/05/2025

## 2025-07-05 NOTE — NURSING
OBGYN LABS ENTERED INTO RESULTS CONSOLE      1st Trimester Labs Entered Into Results Console     [x] AOBRH   [x] Rubella Immune   [x] RPR   [x] HBsAg   [x] HIV   [x] Chlamydia   [x] Gonorrhea   [] Cell-Free DNA   [x] Hep-C   [] Varicella    2nd Trimester Labs Entered Into Results Console     [x]OB Glucose Screen      3rd Trimester Labs Entered Into Results Console      [] GBS   [] RPR    Drug Screen Entered Into Results Console     [] Benzodiazes   [] Methadone   [] Cocaine (Metab)   [] Opiate   [] Amphetamine   [] Marijuana   [] Creatinine   [] Amphetamines-Beaker   [] Barbituates-Beaker   [] Benzodiazepine-Beaker   [] Cannabinoids-Beaker   [] Cocaine-Beaker   [] Fentanyl-Beaker   [] MDMA-Beaker   [] Opiates-Beaker   [] Phencyclidine-Beaker        Results Entered by Lauren Hill, RN    Results Verified for Manual Entry Accuracy by Dayana Gonzalez RN

## 2025-07-05 NOTE — DISCHARGE INSTRUCTIONS
Keep your scheduled appointment with your provider.    Call your Doctor if you have any of the following:  Temperature above 100 degrees  Nausea, vomiting and/or diarrhea  Severe headache, dizziness, or blurred vision  Notable increase in swelling of hands or feet  Notable swelling of face and lips  Difficulty, pain or burning with urination  Foul smelling vaginal discharge  Decreased fetal movement    Come to the hospital if you have any of the following symptoms:  Your water breaks  More than 4-6 contractions in 1 hour for 2 or more hours  Vaginal bleeding like a period    After 28 weeks, you should feel 10 distinct fetal movements within a 2 hour period.    It is recommended that you drink 1/2 a gallon of water each day.  Tea, Soda and Juice are  in addition to this.    Labor and Delivery Phone number: [phone]    If you need to be seen on Labor and delivery between the hours of 6pm and 7am, please enter through the Emergency room entrance.

## 2025-07-09 ENCOUNTER — HOSPITAL ENCOUNTER (OUTPATIENT)
Facility: HOSPITAL | Age: 35
Discharge: HOME | End: 2025-07-09
Attending: SPECIALIST | Admitting: SPECIALIST
Payer: COMMERCIAL

## 2025-07-09 VITALS — RESPIRATION RATE: 17 BRPM | SYSTOLIC BLOOD PRESSURE: 128 MMHG | HEART RATE: 95 BPM | DIASTOLIC BLOOD PRESSURE: 76 MMHG

## 2025-07-09 DIAGNOSIS — O09.529 AMA (ADVANCED MATERNAL AGE) MULTIGRAVIDA 35+: ICD-10-CM

## 2025-07-09 PROCEDURE — 59025 FETAL NON-STRESS TEST: CPT

## 2025-07-09 NOTE — DISCHARGE INSTRUCTIONS
Keep your scheduled appointment with your provider.    Call your Doctor if you have any of the following:  Temperature above 100 degrees  Nausea, vomiting and/or diarrhea  Severe headache, dizziness, or blurred vision  Notable increase in swelling of hands or feet  Notable swelling of face and lips  Difficulty, pain or burning with urination  Foul smelling vaginal discharge  Decreased fetal movement    Come to the hospital if you have any of the following symptoms:  Your water breaks  More than 4-6 contractions in 1 hour for 2 or more hours  Vaginal bleeding like a period    After 28 weeks, you should feel 10 distinct fetal movements within a 2 hour period.    It is recommended that you drink 1/2 a gallon of water each day.  Tea, Soda and Juice are  in addition to this.    Labor and Delivery Phone number: [phone]    If you need to be seen on Labor and delivery between the hours of 6pm and 7am, please enter through the Emergency room entrance.

## 2025-07-16 ENCOUNTER — HOSPITAL ENCOUNTER (OUTPATIENT)
Facility: HOSPITAL | Age: 35
Discharge: HOME | End: 2025-07-16
Attending: SPECIALIST | Admitting: SPECIALIST
Payer: COMMERCIAL

## 2025-07-16 VITALS — SYSTOLIC BLOOD PRESSURE: 114 MMHG | DIASTOLIC BLOOD PRESSURE: 64 MMHG | RESPIRATION RATE: 18 BRPM | HEART RATE: 88 BPM

## 2025-07-16 DIAGNOSIS — Z34.90 ENCOUNTER FOR INDUCTION OF LABOR: Primary | ICD-10-CM

## 2025-07-16 DIAGNOSIS — O09.529 AMA (ADVANCED MATERNAL AGE) MULTIGRAVIDA 35+: ICD-10-CM

## 2025-07-16 PROCEDURE — 59025 FETAL NON-STRESS TEST: CPT

## 2025-07-16 NOTE — DISCHARGE INSTRUCTIONS
Keep your scheduled appointment with your provider.    Call your Doctor if you have any of the following:  Temperature above 100 degrees  Nausea, vomiting and/or diarrhea  Severe headache, dizziness, or blurred vision  Notable increase in swelling of hands or feet  Notable swelling of face and lips  Difficulty, pain or burning with urination  Foul smelling vaginal discharge  Decreased fetal movement    Come to the hospital if you have any of the following symptoms:  Your water breaks  More than 4-6 contractions in 1 hour for 2 or more hours  Vaginal bleeding like a period    After 28 weeks, you should feel 10 distinct fetal movements within a 2 hour period.    It is recommended that you drink 1/2 a gallon of water each day.  Tea, Soda and Juice are  in addition to this.    Labor and Delivery Phone number: [phone]    If you need to be seen on Labor and delivery between the hours of 6pm and 7am, please enter through the Emergency room entrance.

## 2025-07-24 ENCOUNTER — HOSPITAL ENCOUNTER (INPATIENT)
Facility: HOSPITAL | Age: 35
LOS: 1 days | Discharge: HOME | End: 2025-07-25
Attending: SPECIALIST | Admitting: SPECIALIST
Payer: COMMERCIAL

## 2025-07-24 ENCOUNTER — ANESTHESIA EVENT (OUTPATIENT)
Dept: OBSTETRICS AND GYNECOLOGY | Facility: HOSPITAL | Age: 35
End: 2025-07-24
Payer: COMMERCIAL

## 2025-07-24 ENCOUNTER — ANESTHESIA (OUTPATIENT)
Dept: OBSTETRICS AND GYNECOLOGY | Facility: HOSPITAL | Age: 35
End: 2025-07-24
Payer: COMMERCIAL

## 2025-07-24 DIAGNOSIS — Z34.90 ENCOUNTER FOR INDUCTION OF LABOR: ICD-10-CM

## 2025-07-24 DIAGNOSIS — Z34.90 PREGNANCY: Primary | ICD-10-CM

## 2025-07-24 DIAGNOSIS — Z34.90 ENCOUNTER FOR ELECTIVE INDUCTION OF LABOR: ICD-10-CM

## 2025-07-24 DIAGNOSIS — O09.529 AMA (ADVANCED MATERNAL AGE) MULTIGRAVIDA 35+: ICD-10-CM

## 2025-07-24 LAB
ABSOLUTE EOSINOPHIL (SMH): 0.17 K/UL
ABSOLUTE MONOCYTE (SMH): 0.86 K/UL (ref 0.3–1)
ABSOLUTE NEUTROPHIL COUNT (SMH): 8.5 K/UL (ref 1.8–7.7)
AMPHET UR QL SCN: NEGATIVE
BARBITURATE SCN PRESENT UR: NEGATIVE
BASOPHILS # BLD AUTO: 0.05 K/UL
BASOPHILS NFR BLD AUTO: 0.5 %
BENZODIAZ UR QL SCN: NEGATIVE
BILIRUB UR QL STRIP.AUTO: NEGATIVE
BUPRENORPHINE UR QL SCN: NEGATIVE
CANNABINOIDS UR QL SCN: NEGATIVE
CLARITY UR: CLEAR
COCAINE UR QL SCN: NEGATIVE
COLOR UR AUTO: YELLOW
CREAT UR-MCNC: 69.6 MG/DL (ref 15–325)
ERYTHROCYTE [DISTWIDTH] IN BLOOD BY AUTOMATED COUNT: 13.2 % (ref 11.5–14.5)
FENTANYL UR QL SCN: NEGATIVE
GLUCOSE UR QL STRIP: NEGATIVE
GROUP & RH: NORMAL
HCT VFR BLD AUTO: 32.2 % (ref 37–48.5)
HGB BLD-MCNC: 11 GM/DL (ref 12–16)
HGB UR QL STRIP: NEGATIVE
IMM GRANULOCYTES # BLD AUTO: 0.16 K/UL (ref 0–0.04)
IMM GRANULOCYTES NFR BLD AUTO: 1.5 % (ref 0–0.5)
INDIRECT COOMBS: NORMAL
KETONES UR QL STRIP: ABNORMAL
LEUKOCYTE ESTERASE UR QL STRIP: NEGATIVE
LYMPHOCYTES # BLD AUTO: 1.15 K/UL (ref 1–4.8)
MCH RBC QN AUTO: 31.3 PG (ref 27–31)
MCHC RBC AUTO-ENTMCNC: 34.2 G/DL (ref 32–36)
MCV RBC AUTO: 92 FL (ref 82–98)
NITRITE UR QL STRIP: NEGATIVE
NUCLEATED RBC (/100WBC) (SMH): 0 /100 WBC
OPIATES UR QL SCN: NEGATIVE
PCP UR QL: NEGATIVE
PH UR STRIP: 6 [PH]
PLATELET # BLD AUTO: 227 K/UL (ref 150–450)
PMV BLD AUTO: 10.3 FL (ref 9.2–12.9)
PROT UR QL STRIP: NEGATIVE
RBC # BLD AUTO: 3.52 M/UL (ref 4–5.4)
RELATIVE EOSINOPHIL (SMH): 1.6 % (ref 0–8)
RELATIVE LYMPHOCYTE (SMH): 10.6 % (ref 18–48)
RELATIVE MONOCYTE (SMH): 7.9 % (ref 4–15)
RELATIVE NEUTROPHIL (SMH): 77.9 % (ref 38–73)
SP GR UR STRIP: 1.02
T PALLIDUM IGG+IGM SER QL: NORMAL
UROBILINOGEN UR STRIP-ACNC: NEGATIVE EU/DL
WBC # BLD AUTO: 10.84 K/UL (ref 3.9–12.7)

## 2025-07-24 PROCEDURE — 80307 DRUG TEST PRSMV CHEM ANLYZR: CPT | Performed by: SPECIALIST

## 2025-07-24 PROCEDURE — 25000003 PHARM REV CODE 250: Performed by: SPECIALIST

## 2025-07-24 PROCEDURE — 85025 COMPLETE CBC W/AUTO DIFF WBC: CPT | Performed by: SPECIALIST

## 2025-07-24 PROCEDURE — 86593 SYPHILIS TEST NON-TREP QUANT: CPT | Performed by: SPECIALIST

## 2025-07-24 PROCEDURE — 10907ZC DRAINAGE OF AMNIOTIC FLUID, THERAPEUTIC FROM PRODUCTS OF CONCEPTION, VIA NATURAL OR ARTIFICIAL OPENING: ICD-10-PCS | Performed by: SPECIALIST

## 2025-07-24 PROCEDURE — 51702 INSERT TEMP BLADDER CATH: CPT

## 2025-07-24 PROCEDURE — 81003 URINALYSIS AUTO W/O SCOPE: CPT | Performed by: SPECIALIST

## 2025-07-24 PROCEDURE — 72100002 HC LABOR CARE, 1ST 8 HOURS

## 2025-07-24 PROCEDURE — C1751 CATH, INF, PER/CENT/MIDLINE: HCPCS | Performed by: ANESTHESIOLOGY

## 2025-07-24 PROCEDURE — 3E033VJ INTRODUCTION OF OTHER HORMONE INTO PERIPHERAL VEIN, PERCUTANEOUS APPROACH: ICD-10-PCS | Performed by: SPECIALIST

## 2025-07-24 PROCEDURE — 36415 COLL VENOUS BLD VENIPUNCTURE: CPT | Performed by: SPECIALIST

## 2025-07-24 PROCEDURE — 27200710 HC EPIDURAL INFUSION PUMP SET: Performed by: ANESTHESIOLOGY

## 2025-07-24 PROCEDURE — 86900 BLOOD TYPING SEROLOGIC ABO: CPT | Performed by: SPECIALIST

## 2025-07-24 PROCEDURE — 72200005 HC VAGINAL DELIVERY LEVEL II

## 2025-07-24 PROCEDURE — 62326 NJX INTERLAMINAR LMBR/SAC: CPT | Performed by: ANESTHESIOLOGY

## 2025-07-24 PROCEDURE — 11000001 HC ACUTE MED/SURG PRIVATE ROOM

## 2025-07-24 PROCEDURE — 63600175 PHARM REV CODE 636 W HCPCS: Performed by: ANESTHESIOLOGY

## 2025-07-24 PROCEDURE — 72100003 HC LABOR CARE, EA. ADDL. 8 HRS

## 2025-07-24 PROCEDURE — 63600175 PHARM REV CODE 636 W HCPCS: Performed by: SPECIALIST

## 2025-07-24 PROCEDURE — 0KQM0ZZ REPAIR PERINEUM MUSCLE, OPEN APPROACH: ICD-10-PCS | Performed by: SPECIALIST

## 2025-07-24 RX ORDER — PRENATAL WITH FERROUS FUM AND FOLIC ACID 3080; 920; 120; 400; 22; 1.84; 3; 20; 10; 1; 12; 200; 27; 25; 2 [IU]/1; [IU]/1; MG/1; [IU]/1; MG/1; MG/1; MG/1; MG/1; MG/1; MG/1; UG/1; MG/1; MG/1; MG/1; MG/1
1 TABLET ORAL DAILY
Status: DISCONTINUED | OUTPATIENT
Start: 2025-07-24 | End: 2025-07-25 | Stop reason: HOSPADM

## 2025-07-24 RX ORDER — SODIUM CHLORIDE 0.9 % (FLUSH) 0.9 %
3 SYRINGE (ML) INJECTION EVERY 8 HOURS
Status: DISCONTINUED | OUTPATIENT
Start: 2025-07-24 | End: 2025-07-25 | Stop reason: HOSPADM

## 2025-07-24 RX ORDER — METHYLERGONOVINE MALEATE 0.2 MG/ML
200 INJECTION INTRAVENOUS ONCE AS NEEDED
Status: DISCONTINUED | OUTPATIENT
Start: 2025-07-24 | End: 2025-07-24

## 2025-07-24 RX ORDER — OXYTOCIN-SODIUM CHLORIDE 0.9% IV SOLN 30 UNIT/500ML 30-0.9/5 UT/ML-%
95 SOLUTION INTRAVENOUS ONCE AS NEEDED
Status: DISCONTINUED | OUTPATIENT
Start: 2025-07-24 | End: 2025-07-25 | Stop reason: HOSPADM

## 2025-07-24 RX ORDER — DIPHENOXYLATE HYDROCHLORIDE AND ATROPINE SULFATE 2.5; .025 MG/1; MG/1
2 TABLET ORAL EVERY 6 HOURS PRN
Status: DISCONTINUED | OUTPATIENT
Start: 2025-07-24 | End: 2025-07-25 | Stop reason: HOSPADM

## 2025-07-24 RX ORDER — OXYCODONE AND ACETAMINOPHEN 10; 325 MG/1; MG/1
1 TABLET ORAL EVERY 4 HOURS PRN
Status: DISCONTINUED | OUTPATIENT
Start: 2025-07-24 | End: 2025-07-25 | Stop reason: HOSPADM

## 2025-07-24 RX ORDER — ACETAMINOPHEN 325 MG/1
650 TABLET ORAL EVERY 6 HOURS PRN
Status: DISCONTINUED | OUTPATIENT
Start: 2025-07-24 | End: 2025-07-25 | Stop reason: HOSPADM

## 2025-07-24 RX ORDER — ROPIVACAINE HYDROCHLORIDE 2 MG/ML
INJECTION, SOLUTION EPIDURAL; INFILTRATION
Status: COMPLETED | OUTPATIENT
Start: 2025-07-24 | End: 2025-07-24

## 2025-07-24 RX ORDER — ONDANSETRON 4 MG/1
8 TABLET, ORALLY DISINTEGRATING ORAL EVERY 8 HOURS PRN
Status: DISCONTINUED | OUTPATIENT
Start: 2025-07-24 | End: 2025-07-24

## 2025-07-24 RX ORDER — SODIUM CHLORIDE 9 MG/ML
INJECTION, SOLUTION INTRAVENOUS
Status: DISCONTINUED | OUTPATIENT
Start: 2025-07-24 | End: 2025-07-24

## 2025-07-24 RX ORDER — MISOPROSTOL 200 UG/1
800 TABLET ORAL ONCE AS NEEDED
Status: DISCONTINUED | OUTPATIENT
Start: 2025-07-24 | End: 2025-07-24

## 2025-07-24 RX ORDER — OXYTOCIN-SODIUM CHLORIDE 0.9% IV SOLN 30 UNIT/500ML 30-0.9/5 UT/ML-%
95 SOLUTION INTRAVENOUS ONCE AS NEEDED
Status: DISCONTINUED | OUTPATIENT
Start: 2025-07-24 | End: 2025-07-24

## 2025-07-24 RX ORDER — HYDROCORTISONE 25 MG/G
CREAM TOPICAL 3 TIMES DAILY PRN
Status: DISCONTINUED | OUTPATIENT
Start: 2025-07-24 | End: 2025-07-25 | Stop reason: HOSPADM

## 2025-07-24 RX ORDER — SODIUM CHLORIDE, SODIUM LACTATE, POTASSIUM CHLORIDE, CALCIUM CHLORIDE 600; 310; 30; 20 MG/100ML; MG/100ML; MG/100ML; MG/100ML
INJECTION, SOLUTION INTRAVENOUS CONTINUOUS
Status: DISCONTINUED | OUTPATIENT
Start: 2025-07-24 | End: 2025-07-24

## 2025-07-24 RX ORDER — ONDANSETRON 4 MG/1
8 TABLET, ORALLY DISINTEGRATING ORAL EVERY 8 HOURS PRN
Status: DISCONTINUED | OUTPATIENT
Start: 2025-07-24 | End: 2025-07-25 | Stop reason: HOSPADM

## 2025-07-24 RX ORDER — LIDOCAINE HYDROCHLORIDE 10 MG/ML
10 INJECTION, SOLUTION INFILTRATION; PERINEURAL ONCE AS NEEDED
Status: DISCONTINUED | OUTPATIENT
Start: 2025-07-24 | End: 2025-07-24

## 2025-07-24 RX ORDER — OXYTOCIN-SODIUM CHLORIDE 0.9% IV SOLN 30 UNIT/500ML 30-0.9/5 UT/ML-%
10 SOLUTION INTRAVENOUS ONCE AS NEEDED
Status: COMPLETED | OUTPATIENT
Start: 2025-07-24 | End: 2025-07-24

## 2025-07-24 RX ORDER — OXYTOCIN-SODIUM CHLORIDE 0.9% IV SOLN 30 UNIT/500ML 30-0.9/5 UT/ML-%
95 SOLUTION INTRAVENOUS CONTINUOUS PRN
Status: DISCONTINUED | OUTPATIENT
Start: 2025-07-24 | End: 2025-07-25 | Stop reason: HOSPADM

## 2025-07-24 RX ORDER — TRANEXAMIC ACID 10 MG/ML
1000 INJECTION, SOLUTION INTRAVENOUS EVERY 30 MIN PRN
Status: DISCONTINUED | OUTPATIENT
Start: 2025-07-24 | End: 2025-07-24

## 2025-07-24 RX ORDER — MISOPROSTOL 200 UG/1
800 TABLET ORAL ONCE AS NEEDED
Status: DISCONTINUED | OUTPATIENT
Start: 2025-07-24 | End: 2025-07-25 | Stop reason: HOSPADM

## 2025-07-24 RX ORDER — AMOXICILLIN 250 MG
1 CAPSULE ORAL 2 TIMES DAILY PRN
Status: DISCONTINUED | OUTPATIENT
Start: 2025-07-24 | End: 2025-07-25 | Stop reason: HOSPADM

## 2025-07-24 RX ORDER — OXYCODONE AND ACETAMINOPHEN 5; 325 MG/1; MG/1
1 TABLET ORAL EVERY 4 HOURS PRN
Status: DISCONTINUED | OUTPATIENT
Start: 2025-07-24 | End: 2025-07-25 | Stop reason: HOSPADM

## 2025-07-24 RX ORDER — CARBOPROST TROMETHAMINE 250 UG/ML
250 INJECTION, SOLUTION INTRAMUSCULAR
Status: DISCONTINUED | OUTPATIENT
Start: 2025-07-24 | End: 2025-07-24

## 2025-07-24 RX ORDER — DOCUSATE SODIUM 100 MG/1
200 CAPSULE, LIQUID FILLED ORAL 2 TIMES DAILY PRN
Status: DISCONTINUED | OUTPATIENT
Start: 2025-07-24 | End: 2025-07-25 | Stop reason: HOSPADM

## 2025-07-24 RX ORDER — TRANEXAMIC ACID 10 MG/ML
1000 INJECTION, SOLUTION INTRAVENOUS EVERY 30 MIN PRN
Status: DISCONTINUED | OUTPATIENT
Start: 2025-07-24 | End: 2025-07-25 | Stop reason: HOSPADM

## 2025-07-24 RX ORDER — SIMETHICONE 80 MG
1 TABLET,CHEWABLE ORAL EVERY 6 HOURS PRN
Status: DISCONTINUED | OUTPATIENT
Start: 2025-07-24 | End: 2025-07-25 | Stop reason: HOSPADM

## 2025-07-24 RX ORDER — SIMETHICONE 80 MG
1 TABLET,CHEWABLE ORAL 4 TIMES DAILY PRN
Status: DISCONTINUED | OUTPATIENT
Start: 2025-07-24 | End: 2025-07-24

## 2025-07-24 RX ORDER — CALCIUM CARBONATE 200(500)MG
500 TABLET,CHEWABLE ORAL 3 TIMES DAILY PRN
Status: DISCONTINUED | OUTPATIENT
Start: 2025-07-24 | End: 2025-07-24

## 2025-07-24 RX ORDER — OXYTOCIN 10 [USP'U]/ML
10 INJECTION, SOLUTION INTRAMUSCULAR; INTRAVENOUS ONCE AS NEEDED
Status: DISCONTINUED | OUTPATIENT
Start: 2025-07-24 | End: 2025-07-24

## 2025-07-24 RX ORDER — OXYTOCIN-SODIUM CHLORIDE 0.9% IV SOLN 30 UNIT/500ML 30-0.9/5 UT/ML-%
95 SOLUTION INTRAVENOUS CONTINUOUS PRN
Status: DISCONTINUED | OUTPATIENT
Start: 2025-07-24 | End: 2025-07-24

## 2025-07-24 RX ORDER — OXYTOCIN-SODIUM CHLORIDE 0.9% IV SOLN 30 UNIT/500ML 30-0.9/5 UT/ML-%
0-32 SOLUTION INTRAVENOUS CONTINUOUS
Status: DISCONTINUED | OUTPATIENT
Start: 2025-07-24 | End: 2025-07-24

## 2025-07-24 RX ORDER — DIPHENOXYLATE HYDROCHLORIDE AND ATROPINE SULFATE 2.5; .025 MG/1; MG/1
2 TABLET ORAL EVERY 6 HOURS PRN
Status: DISCONTINUED | OUTPATIENT
Start: 2025-07-24 | End: 2025-07-24

## 2025-07-24 RX ORDER — CARBOPROST TROMETHAMINE 250 UG/ML
250 INJECTION, SOLUTION INTRAMUSCULAR
Status: DISCONTINUED | OUTPATIENT
Start: 2025-07-24 | End: 2025-07-25 | Stop reason: HOSPADM

## 2025-07-24 RX ORDER — METHYLERGONOVINE MALEATE 0.2 MG/ML
200 INJECTION INTRAVENOUS ONCE AS NEEDED
Status: DISCONTINUED | OUTPATIENT
Start: 2025-07-24 | End: 2025-07-25 | Stop reason: HOSPADM

## 2025-07-24 RX ORDER — OXYTOCIN-SODIUM CHLORIDE 0.9% IV SOLN 30 UNIT/500ML 30-0.9/5 UT/ML-%
30 SOLUTION INTRAVENOUS ONCE AS NEEDED
Status: DISCONTINUED | OUTPATIENT
Start: 2025-07-24 | End: 2025-07-24

## 2025-07-24 RX ORDER — FENTANYL/BUPIVACAINE/NS/PF 2MCG/ML-.1
10 PLASTIC BAG, INJECTION (ML) INJECTION CONTINUOUS
Status: DISCONTINUED | OUTPATIENT
Start: 2025-07-24 | End: 2025-07-24

## 2025-07-24 RX ORDER — OXYTOCIN 10 [USP'U]/ML
10 INJECTION, SOLUTION INTRAMUSCULAR; INTRAVENOUS ONCE AS NEEDED
Status: DISCONTINUED | OUTPATIENT
Start: 2025-07-24 | End: 2025-07-25 | Stop reason: HOSPADM

## 2025-07-24 RX ORDER — BUTORPHANOL TARTRATE 2 MG/ML
1 INJECTION, SOLUTION INTRAMUSCULAR; INTRAVENOUS EVERY 4 HOURS PRN
Status: DISCONTINUED | OUTPATIENT
Start: 2025-07-24 | End: 2025-07-24

## 2025-07-24 RX ORDER — ROPIVACAINE HYDROCHLORIDE 2 MG/ML
20 INJECTION, SOLUTION EPIDURAL; INFILTRATION ONCE
Status: DISCONTINUED | OUTPATIENT
Start: 2025-07-24 | End: 2025-07-24

## 2025-07-24 RX ADMIN — SODIUM CHLORIDE, POTASSIUM CHLORIDE, SODIUM LACTATE AND CALCIUM CHLORIDE: 600; 310; 30; 20 INJECTION, SOLUTION INTRAVENOUS at 09:07

## 2025-07-24 RX ADMIN — Medication: at 01:07

## 2025-07-24 RX ADMIN — OXYCODONE HYDROCHLORIDE AND ACETAMINOPHEN 1 TABLET: 5; 325 TABLET ORAL at 07:07

## 2025-07-24 RX ADMIN — DOCUSATE SODIUM 200 MG: 100 CAPSULE, LIQUID FILLED ORAL at 07:07

## 2025-07-24 RX ADMIN — OXYTOCIN-SODIUM CHLORIDE 0.9% IV SOLN 30 UNIT/500ML 10 UNITS: 30-0.9/5 SOLUTION at 10:07

## 2025-07-24 RX ADMIN — IBUPROFEN 600 MG: 400 TABLET ORAL at 07:07

## 2025-07-24 RX ADMIN — OXYTOCIN-SODIUM CHLORIDE 0.9% IV SOLN 30 UNIT/500ML 2 MILLI-UNITS/MIN: 30-0.9/5 SOLUTION at 01:07

## 2025-07-24 RX ADMIN — IBUPROFEN 600 MG: 400 TABLET ORAL at 02:07

## 2025-07-24 RX ADMIN — ROPIVACAINE HYDROCHLORIDE 10 ML: 2 INJECTION, SOLUTION EPIDURAL; INFILTRATION; PERINEURAL at 06:07

## 2025-07-24 RX ADMIN — SODIUM CHLORIDE, POTASSIUM CHLORIDE, SODIUM LACTATE AND CALCIUM CHLORIDE: 600; 310; 30; 20 INJECTION, SOLUTION INTRAVENOUS at 12:07

## 2025-07-24 RX ADMIN — PROMETHAZINE HYDROCHLORIDE 12.5 MG: 25 INJECTION INTRAMUSCULAR; INTRAVENOUS at 02:07

## 2025-07-24 NOTE — PLAN OF CARE
Problem: Adult Inpatient Plan of Care  Goal: Plan of Care Review  Outcome: Progressing  Goal: Patient-Specific Goal (Individualized)  Outcome: Progressing  Goal: Absence of Hospital-Acquired Illness or Injury  Outcome: Progressing  Goal: Optimal Comfort and Wellbeing  Outcome: Progressing  Goal: Readiness for Transition of Care  Outcome: Progressing     Problem:  Fall Injury Risk  Goal: Absence of Fall, Infant Drop and Related Injury  Outcome: Progressing     Problem: Infection  Goal: Absence of Infection Signs and Symptoms  Outcome: Progressing     Problem: Labor  Goal: Hemostasis  Outcome: Progressing  Goal: Stable Fetal Wellbeing  Outcome: Progressing  Goal: Effective Progression to Delivery  Outcome: Progressing  Goal: Absence of Infection Signs and Symptoms  Outcome: Progressing  Goal: Acceptable Pain Control  Outcome: Progressing  Goal: Normal Uterine Contraction Pattern  Outcome: Progressing     Problem: Breastfeeding  Goal: Effective Breastfeeding  Outcome: Progressing     Problem: Pain Acute  Goal: Optimal Pain Control and Function  Outcome: Progressing

## 2025-07-24 NOTE — ASSESSMENT & PLAN NOTE
IUP at 39 weeks gestational age   Induction of labor-currently on Pitocin   AROM-clear   GBS negative   Rh positive   Advanced maternal age   Epidural when desires

## 2025-07-24 NOTE — PLAN OF CARE
[facility]  Discharge Assessment    Primary Care Provider: Ene Rene MD     OB Screen completed using health record.  No needs anticipated at this time, and no consult(s) noted.    OB Screen (most recent)       OB Screen - 25 1227          OB SCREEN    Assessment Type Discharge Planning Assessment     Source of Information health record     Received Prenatal Care Yes     Any indications/suspicions for None     Is this a teen pregnancy No     Is the baby in NICU No     Indication for adoption/Safe Haven No     Indication for DME/post-acute needs No     HIV (+) No     Any congenital  disorders No     Fetal demise/ death No

## 2025-07-24 NOTE — PLAN OF CARE
Problem: Adult Inpatient Plan of Care  Goal: Plan of Care Review  Outcome: Progressing  Goal: Patient-Specific Goal (Individualized)  Outcome: Progressing  Goal: Absence of Hospital-Acquired Illness or Injury  Outcome: Progressing  Goal: Optimal Comfort and Wellbeing  Outcome: Progressing  Goal: Readiness for Transition of Care  Outcome: Progressing     Problem:  Fall Injury Risk  Goal: Absence of Fall, Infant Drop and Related Injury  Outcome: Progressing     Problem: Infection  Goal: Absence of Infection Signs and Symptoms  Outcome: Progressing     Problem: Breastfeeding  Goal: Effective Breastfeeding  Outcome: Progressing     Problem: Pain Acute  Goal: Optimal Pain Control and Function  Outcome: Progressing

## 2025-07-24 NOTE — ANESTHESIA PROCEDURE NOTES
Epidural    Patient location during procedure: OB   Reason for block: primary anesthetic   Reason for block: labor analgesia requested by patient and obstetrician  Diagnosis: IUP   Start time: 7/24/2025 6:40 AM  Timeout: 7/24/2025 6:39 AM  End time: 7/24/2025 6:55 AM    Staffing  Performing Provider: Randall Hill MD  Authorizing Provider: Randall Hill MD    Staffing  Performed by: Randall Hill MD  Authorized by: Randall Hill MD        Preanesthetic Checklist  Completed: patient identified, IV checked, site marked, risks and benefits discussed, surgical consent, monitors and equipment checked, pre-op evaluation, timeout performed, anesthesia consent given, hand hygiene performed and patient being monitored  Preparation  Patient position: sitting  Prep: Betadine  Patient monitoring: ECG and Blood Pressure  Reason for block: primary anesthetic   Epidural  Skin Anesthetic: lidocaine 1%  Skin Wheal: 3 mL  Administration type: continuous  Approach: midline  Interspace: L3-4    Injection technique: VIRAL air  Needle and Epidural Catheter  Needle type: Tuohy   Needle gauge: 17  Needle length: 3.5 inches  Needle insertion depth: 8 cm  Catheter type: springwound and multi-orifice  Catheter size: 19 G  Catheter at skin depth: 12 cm  Insertion Attempts: 1  Test dose: 3 mL of lidocaine 1.5% with Epi 1-to-200,000  Additional Documentation: no paresthesia on injection, negative aspiration for heme and CSF, no significant pain on injection and no significant complaints from patient  Needle localization: anatomical landmarks  Medications:  Volume per aspiration: 5 mL  Time between aspirations: 5 minutes   Assessment  Upper dermatomal levels - Left: T6  Right: T6   Dermatomal levels determined by alcohol wipe  Ease of block: easy  Patient's tolerance of the procedure: comfortable throughout block and no complaints  Additional Notes  Epidural dosed with Ropivacaine 0.2% x 5/5 mL. No inadvertent dural puncture with Tuohy.  Dural puncture  not performed with spinal needle    Medications:    Medications: ropivacaine (NAROPIN) solution 0.2% - Epidural   10 mL - 7/24/2025 6:55:00 AM

## 2025-07-24 NOTE — SUBJECTIVE & OBJECTIVE
Interval History:  Annalise is a 34 y.o.  at 39w0d. She is doing well.  Feeling mild contractions, active fetal movement    Objective:     Vital Signs (Most Recent):  Temp: 98.2 °F (36.8 °C) (25 0305)  Pulse: 96 (25 0520)  Resp: 16 (25 0520)  BP: (!) 109/59 (25 0520)  SpO2: (!) 94 % (25 0520) Vital Signs (24h Range):  Temp:  [98.2 °F (36.8 °C)-98.7 °F (37.1 °C)] 98.2 °F (36.8 °C)  Pulse:  [] 96  Resp:  [16] 16  SpO2:  [94 %-99 %] 94 %  BP: ()/(51-65) 109/59     Weight: 91.6 kg (202 lb)  Body mass index is 31.64 kg/m².    FHT:  Baseline 140s with good variability  TOCO:  Q 2-3 minutes    Cervical Exam:  Dilation:  2-1/2 cm  Effacement:  70%  Station: -3  Presentation: Vertex     Significant Labs:  Lab Results   Component Value Date    GROUPTRH A POS 2025    HEPBSAG Negative 12/10/2024    STREPBCULT Negative 2025       CBC:   Recent Labs   Lab 25  0046   WBC 10.84   RBC 3.52*   HGB 11.0*   HCT 32.2*      MCV 92   MCH 31.3*   MCHC 34.2     I have personallly reviewed all pertinent lab results from the last 24 hours.    Physical Exam  General-no distress resting comfortably   Abdomen/uterus gravid nontender  Review of Systems

## 2025-07-24 NOTE — NURSING
[facility]  Department of Obstetrics and Gynecology  PATIENT NAME: Annalise Nava  MRN: [id number]  TODAY'S DATE: 2025    CHIEF COMPLAINT: No chief complaint on file.      OB History    Para Term  AB Living   4 2 2 0 1 2   SAB IAB Ectopic Multiple Live Births   1 0 0 0 2      # Outcome Date GA Lbr Wallace/2nd Weight Sex Type Anes PTL Lv   4 Current            3 Term 24 40w0d 11:41 / 00:15 3.83 kg (8 lb 7.1 oz) F Vag-Spont EPI N BETITO      Name: PRESTON,MARCELA RYDER      Apgar1: 9  Apgar5: 9   2 Term 22 40w3d 24:30 / 01:47 3.29 kg (7 lb 4.1 oz) F Vag-Spont EPI N BETITO      Name: PRESTON,MARCELA RYDER      Apgar1: 8  Apgar5: 9   1 SAB 2020 8w0d            Past Medical History:   Diagnosis Date    Infertility, female     IVF    Irritable bowel syndrome without diarrhea      History reviewed. No pertinent surgical history.  Social History[1]    Prenatal Labs  Lab Results   Component Value Date    GROUPTRH A POS 2025    HGB 11.0 (L) 2025    HCT 32.2 (L) 2025     2025    RUBELLAIMMUN Immune 12/10/2024    HEPBSAG Negative 12/10/2024    [id number] Non Reactive 12/10/2024    RPR Non Reactive 12/10/2024    OBGLUCOSESCR 119 2023    STREPBCULT Negative 2025       VITAL SIGNS - ABNORMAL VITALS INCLUDE TEMP >100.4,RR <12 or >26, SUSTAINED MATERNAL PULSE <60 or >120     VITAL SIGNS (Most Recent)  Temp: 98.7 °F (37.1 °C) (25)  Pulse: 75 (25)  Resp: 16 (25)  BP: (!) 99/53 (25)  SpO2: 97 % (25)    OUTPATIENT MEDICATIONS  Current Outpatient Medications   Medication Instructions    azithromycin (Z-USHA) 250 MG tablet Take 2 tablets by mouth on day #1, THEN take 1 tablet by mouth once daily on days #2-5    benzonatate (TESSALON) 200 mg, Oral, 3 times daily PRN    erythromycin (ROMYCIN) ophthalmic ointment Place into both eyes.    ibuprofen (ADVIL,MOTRIN) 800 mg, 3 times daily    promethazine-dextromethorphan  (PROMETHAZINE-DM) 6.25-15 mg/5 mL Syrp Take 5 mLs by mouth every 6 (six) hours as needed for cough.    tazarotene (TAZORAC) 0.1 % cream Aaa abdomen nightly    triamcinolone acetonide 0.1% (KENALOG) 0.1 % cream AAA bid       Lauren Hill, RN  [facility]  07/24/2025         [1]   Social History  Tobacco Use    Smoking status: Never    Smokeless tobacco: Never   Vaping Use    Vaping status: Never Used   Substance Use Topics    Alcohol use: Not Currently     Comment: occasional    Drug use: No

## 2025-07-24 NOTE — NURSING
[facility]  Department of OBGYN  OB Summary        PATIENT NAME: Annalise Nava                                                                                                                                                                       AGE: 34 y.o.                                                                                          MRN: [id number]  PATIENT LOCATION:  LDR2/LDR2-A  ADMIT DATE: 2025  TODAY'S DATE: 2025 Hospital Day: 1  JN: Estimated Date of Delivery: 25  GA: 39w0d     OB HISTORY:    OB History    Para Term  AB Living   4 3 3  1 3   SAB IAB Ectopic Multiple Live Births   1   0 3      # Outcome Date GA Lbr Wallace/2nd Weight Sex Type Anes PTL Lv   4 Term 25 39w0d 04:13 / 00:02 3.92 kg (8 lb 10.3 oz) M Vag-Spont EPI N BETITO   3 Term 24 40w0d 11:41 / 00:15 3.83 kg (8 lb 7.1 oz) F Vag-Spont EPI N BETITO   2 Term 22 40w3d 24:30 / 01:47 3.29 kg (7 lb 4.1 oz) F Vag-Spont EPI N BETITO   1 SAB 2020 8w0d              PRE-PROCEDURE DIAGNOSIS: Encounter for induction of labor    PROCEDURE:   * No surgery found *       MATERNAL LABS   Lab Results   Component Value Date    GROUPTRH A POS 2025    HGB 11.0 (L) 2025    HCT 32.2 (L) 2025     2025    RUBELLAIMMUN Immune 12/10/2024    HEPBSAG Negative 12/10/2024    [id number] Non Reactive 12/10/2024    RPR Non Reactive 12/10/2024    OBGLUCOSESCR 119 2023    STREPBCULT Negative 2025       Fentanyl, Urine   Date Value Ref Range Status   2024 Negative @GRETEL Negative Final       Benzodiazepines   Date Value Ref Range Status   2024 Negative Negative Final     Cocaine (Metab.)   Date Value Ref Range Status   2024 Negative Negative Final     Opiate Scrn, Ur   Date Value Ref Range Status   2024 Negative Negative Final     Barbiturate Screen, Ur   Date Value Ref Range Status   2024 Negative Negative Final     Amphetamine Screen, Ur   Date  Value Ref Range Status   2024 Negative Negative Final     THC   Date Value Ref Range Status   2025 Negative Negative Final     Phencyclidine   Date Value Ref Range Status   2024 Negative Negative Final     BUPRENORPHINE   Date Value Ref Range Status   2025 Negative Negative Final     Comment:     Buprenorphine urine screening threshold: 5 ng/mL.    This report is intended for use in clinical monitoring and management of patients only.    2024 Negative  Final     Comment:     Buprenorphine urine screening threshold: 10 ng/mL.    This report is intended for use in clinical monitoring and management   of   patients only.          SPECIMENS  Specimen (24h ago, onward)      None             Antibiotics (From admission, onward)      None            INPATIENT MEDICATIONS  Current Medications[1]    OUTPATIENT MEDICATIONS  Current Outpatient Medications   Medication Instructions    azithromycin (Z-USHA) 250 MG tablet Take 2 tablets by mouth on day #1, THEN take 1 tablet by mouth once daily on days #2-5    benzonatate (TESSALON) 200 mg, Oral, 3 times daily PRN    erythromycin (ROMYCIN) ophthalmic ointment Place into both eyes.    ibuprofen (ADVIL,MOTRIN) 800 mg, 3 times daily    promethazine-dextromethorphan (PROMETHAZINE-DM) 6.25-15 mg/5 mL Syrp Take 5 mLs by mouth every 6 (six) hours as needed for cough.    tazarotene (TAZORAC) 0.1 % cream Aaa abdomen nightly    triamcinolone acetonide 0.1% (KENALOG) 0.1 % cream AAA bid       VITAL SIGNS (Most Recent)  Temp: 98.2 °F (36.8 °C) (25 1037)  Pulse: 93 (25 1215)  Resp: 16 (25 0711)  BP: (!) 130/59 (25 1215)  SpO2: 95 % (25 1215)  Temp (36hrs), Av.4 °F (36.9 °C), Min:98.2 °F (36.8 °C), Max:98.7 °F (37.1 °C)      Delivery Information for Maurice Nava    Birth information:  YOB: 2027   Time of birth: 10:34 AM   Sex: male   Head Delivery Date/Time: 2025 10:34 AM   Delivery type: Vaginal,  "Spontaneous   Gestational Age: 39w0d       Delivery Providers    Delivering clinician: Toby Chavez MD   Provider Role    Ana Mack MA Delivery Nurse    Paula Erwin, RN Delivery Nurse    Naomy West RN Transition Nursery    Sharon York ST Scrub Person    Clary Tran RN Transition Nursery    Cherelle Hernandez, RN Registered Nurse              Measurements    Weight: 3920 g  Weight (lbs): 8 lb 10.3 oz  Length: 52.1 cm  Length (in): 20.5"  Head circumference: 35.6 cm  Chest circumference: 34.5 cm  Abdominal girth: 33 cm         Apgars    Living status: Living  Apgar Component Scores:  1 min.:  5 min.:  10 min.:  15 min.:  20 min.:    Skin color:  0  1       Heart rate:  2  2       Reflex irritability:  2  2       Muscle tone:  2  2       Respiratory effort:  2  2       Total:  8  9       Apgars assigned by: CLARY TRAN RN         Operative Delivery    Forceps attempted?: No  Vacuum extractor attempted?: No         Shoulder Dystocia    Shoulder dystocia present?: No           Presentation    Presentation: Vertex  Position: Middle Occiput Anterior           Interventions/Resuscitation    Method: Bulb Suctioning, Tactile Stimulation       Cord    Vessels: 3 vessels  Complications: None  Delayed Cord Clamping?: Yes  Cord Clamped Date/Time: 2025 10:35 AM  Cord Blood Disposition: Sent with Baby  Gases Sent?: No  Stem Cell Collection (by MD): No       Placenta    Placenta delivery date/time: 2025 10:37  Placenta removal: Spontaneous  Placenta appearance: Intact  Placenta disposition: Discarded           Labor Events:       labor: No     Labor Onset Date/Time: 2025 06:19     Dilation Complete Date/Time: 2025 10:32     Start Pushing Date/Time: 2025 10:32     Rupture Date/Time: 25 0618        Rupture type: ARM (Artificial Rupture)        Fluid Amount:       Fluid Color: Clear      steroids: None     Antibiotics given for GBS: No     Induction: " oxytocin     Indications for induction:  Elective     Augmentation: amniotomy     Indications for augmentation:       Labor complications: None     Additional complications:          Cervical ripening:                     Delivery:      Episiotomy: None     Indication for Episiotomy:       Perineal Lacerations: 2nd Repaired:  Yes   Periurethral Laceration:   Repaired:     Labial Laceration:   Repaired:     Sulcus Laceration:   Repaired:     Vaginal Laceration:   Repaired:     Cervical Laceration:   Repaired:     Repair suture:       Repair # of packets: 1     Last Value - EBL - Nursing (mL):       Sum - EBL - Nursing (mL): 0     Last Value - EBL - Anesthesia (mL):      Calculated QBL (mL): 270     Running total QBL (mL): 330     Vaginal Sweep Performed: Yes     Surgicount Correct: Yes       Other providers:       Anesthesia    Method: Epidural          Details (if applicable):  Trial of Labor      Categorization:      Priority:     Indications for :     Incision Type:       Additional  information:  Forceps:    Vacuum:    Breech:    Observed anomalies    Other (Comments):           Time ruptured: 4h 16m    SKIN TO SKIN                INFANT FEEDING       PAST MEDICAL HISTORY   Past Medical History:   Diagnosis Date    Infertility, female     IVF    Irritable bowel syndrome without diarrhea         PAST SURGICAL HISTORY    History reviewed. No pertinent surgical history.     SOCIAL HISTORY    Social History[2]                  Ana Mack RN  Date of Service: 2025  12:44 PM        [1]   Current Facility-Administered Medications   Medication Dose Route Frequency Provider Last Rate Last Admin    calcium carbonate 200 mg calcium (500 mg) chewable tablet 500 mg  500 mg Oral TID PRN Toby Chavez MD        carboprost injection 250 mcg  250 mcg Intramuscular Q15 Min PRN Toby Chavez MD        diphenoxylate-atropine 2.5-0.025 mg per tablet 2 tablet  2 tablet Oral Q6H  Toby Kohli MD        lactated ringers infusion   Intravenous Continuous Toby Chavez  mL/hr at 07/24/25 1241 Rate Verify at 07/24/25 1241    LIDOcaine HCL 10 mg/ml (1%) injection 10 mL  10 mL Intradermal Once Toby Kohli MD        methylergonovine injection 200 mcg  200 mcg Intramuscular Once Toby Kohli MD        miSOPROStoL tablet 800 mcg  800 mcg Rectal Once Toby Kohli MD        miSOPROStoL tablet 800 mcg  800 mcg Oral Once Toby Kohli MD        ondansetron disintegrating tablet 8 mg  8 mg Oral Q8H Toby Kohli MD        oxytocin 30 units/500 mL (60 milliunits/mL) in 0.9% NaCl (non-titrating)  95 arlen-units/min Intravenous Continuous Toby Kohli MD        oxytocin 30 units/500 mL (60 milliunits/mL) in 0.9% NaCl (non-titrating)  95 arlen-units/min Intravenous Once Toby Kohli MD 95 mL/hr at 07/24/25 1046 95 arlen-units/min at 07/24/25 1046    oxytocin 30 units/500 mL (60 milliunits/mL) in 0.9% NaCl IV bolus from bag  30 Units Intravenous Once Toby Kohli MD        oxytocin injection 10 Units  10 Units Intramuscular Once Toby Kohli MD        promethazine 12.5 mg in 0.9% NaCl 50 mL IVPB  12.5 mg Intravenous Q4H PRToby Felder MD   Stopped at 07/24/25 0245    simethicone chewable tablet 80 mg  1 tablet Oral QID Toby Kohli MD        tranexamic acid in NaCl,iso-os IVPB 1,000 mg  1,000 mg Intravenous Q30 Min Toby Kohli MD       [2]   Social History  Tobacco Use    Smoking status: Never    Smokeless tobacco: Never   Vaping Use    Vaping status: Never Used   Substance Use Topics    Alcohol use: Not Currently     Comment: occasional    Drug use: No

## 2025-07-24 NOTE — PLAN OF CARE
Problem: Labor  Goal: Hemostasis  Outcome: Progressing     Problem: Labor  Goal: Stable Fetal Wellbeing  Outcome: Progressing     Problem: Labor  Goal: Effective Progression to Delivery  Outcome: Progressing     Problem: Labor  Goal: Absence of Infection Signs and Symptoms  Outcome: Progressing     Problem: Labor  Goal: Acceptable Pain Control  Outcome: Progressing     Problem: Labor  Goal: Normal Uterine Contraction Pattern  Outcome: Progressing       Ongoing assessment, acceptable pain management, Continuous EFM and tocometry. Vaginal bleeding wnl, anticipate . Reassess pt needs prn

## 2025-07-24 NOTE — PROGRESS NOTES
[facility]  Obstetrics  Labor Progress Note    Patient Name: Annalise Nava  MRN: [id number]  Admission Date: 2025  Hospital Length of Stay: 0 days  Attending Physician: Toby Chavez MD  Primary Care Provider: Ene Rene MD    Subjective:     Principal Problem:Encounter for induction of labor    Hospital Course:  34-year-old  4 para  at 39 weeks  gestational age admitted for induction of labor.    Interval History:  Annalise is a 34 y.o.  at 39w0d. She is doing well.  Feeling mild contractions, active fetal movement    Objective:     Vital Signs (Most Recent):  Temp: 98.2 °F (36.8 °C) (25 0305)  Pulse: 96 (25 0520)  Resp: 16 (25 0520)  BP: (!) 109/59 (25 0520)  SpO2: (!) 94 % (25 05) Vital Signs (24h Range):  Temp:  [98.2 °F (36.8 °C)-98.7 °F (37.1 °C)] 98.2 °F (36.8 °C)  Pulse:  [] 96  Resp:  [16] 16  SpO2:  [94 %-99 %] 94 %  BP: ()/(51-65) 109/59     Weight: 91.6 kg (202 lb)  Body mass index is 31.64 kg/m².    FHT:  Baseline 140s with good variability  TOCO:  Q 2-3 minutes    Cervical Exam:  Dilation:  2-1/2 cm  Effacement:  70%  Station: -3  Presentation: Vertex     Significant Labs:  Lab Results   Component Value Date    GROUPTRH A POS 2025    HEPBSAG Negative 12/10/2024    STREPBCULT Negative 2025       CBC:   Recent Labs   Lab 25  0046   WBC 10.84   RBC 3.52*   HGB 11.0*   HCT 32.2*      MCV 92   MCH 31.3*   MCHC 34.2     I have personallly reviewed all pertinent lab results from the last 24 hours.    Physical Exam  General-no distress resting comfortably   Abdomen/uterus gravid nontender  Review of Systems  Assessment/Plan:     34 y.o. female  at 39w0d for:    * Encounter for induction of labor  IUP at 39 weeks gestational age   Induction of labor-currently on Pitocin   AROM-clear   GBS negative   Rh positive   Advanced maternal age   Epidural when desires          Vinh Chavez,  MD  Obstetrics  [facility]

## 2025-07-24 NOTE — NURSING TRANSFER
Nursing Transfer Note      7/24/2025   1:11 PM    Nurse giving handoff:Ana OLMSTEAD  Nurse receiving handoff:Malika OLMSTEAD     Reason patient is being transferred: Postpartum    Transfer To: 2109    Transfer via wheelchair    Transfer with personal belongings    Transported by Ana OLMSTEAD     Transfer Vital Signs:  Blood Pressure: Pending PP nurse vitals   Heart Rate:^  O2:^  Temperature:^  Respirations:^    Telemetry: n/a  Order for Tele Monitor? No    Additional Lines: n/a    Medicines sent: n/a    Any special needs or follow-up needed: n/a    Patient belongings transferred with patient: Yes    Chart send with patient: Yes    Notified: spouse    Patient reassessed at: 7/24/25 1310 (date, time)  1  Upon arrival to floor: patient oriented to room, call bell in reach, and bed in lowest position

## 2025-07-24 NOTE — ANESTHESIA PREPROCEDURE EVALUATION
07/24/2025  Annalise Nava is a 34 y.o., female.    Problem List[1]    History reviewed. No pertinent surgical history.     Tobacco Use:  The patient  reports that she has never smoked. She has never used smokeless tobacco.               Lab Results   Component Value Date    WBC 10.84 07/24/2025    HGB 11.0 (L) 07/24/2025    HCT 32.2 (L) 07/24/2025    MCV 92 07/24/2025     07/24/2025     BMP  Lab Results   Component Value Date     (L) 09/22/2023    K 3.8 09/22/2023     09/22/2023    CO2 21 (L) 09/22/2023    BUN 15 09/22/2023    CREATININE 0.7 09/22/2023    CALCIUM 9.4 09/22/2023    ANIONGAP 7 (L) 09/22/2023    GLU 87 09/22/2023    GLU 96 11/23/2021    GLU 90 12/13/2019       No results found for this or any previous visit.            Pre-op Assessment    I have reviewed the Patient Summary Reports.     I have reviewed the Nursing Notes. I have reviewed the NPO Status.   I have reviewed the Medications.     Review of Systems  Anesthesia Hx:  No problems with previous Anesthesia   Neg history of prior surgery.          Denies Family Hx of Anesthesia complications.    Denies Personal Hx of Anesthesia complications.                    Social:  No Alcohol Use, Non-Smoker       Hematology/Oncology:    Oncology Normal    -- Anemia:                                  EENT/Dental:  EENT/Dental Normal           Cardiovascular:  Cardiovascular Normal                                              Pulmonary:  Pulmonary Normal                       Renal/:  Renal/ Normal                 Hepatic/GI:  Hepatic/GI Normal       IBS             Musculoskeletal:  Musculoskeletal Normal                Neurological:  Neurology Normal                                      Endocrine:  Endocrine Normal            Dermatological:  Skin Normal    Psych:  Psychiatric Normal                    Physical  Exam  General: Well nourished and Alert    Airway:  Mallampati: II   Mouth Opening: Normal  TM Distance: Normal  Tongue: Normal  Neck ROM: Normal ROM    Dental:  Intact    Chest/Lungs:  Clear to auscultation, Normal Respiratory Rate    Heart:  Rate: Normal  Rhythm: Regular Rhythm  Sounds: Normal        Anesthesia Plan  Type of Anesthesia, risks & benefits discussed:    Anesthesia Type: Epidural  Intra-op Monitoring Plan: Standard ASA Monitors  Informed Consent: Informed consent signed with the Patient and all parties understand the risks and agree with anesthesia plan.  All questions answered.   ASA Score: 2    Ready For Surgery From Anesthesia Perspective.     .           [1]   Patient Active Problem List  Diagnosis    Irritable bowel syndrome    Essential tremor    Family history of early CAD    Ingrown nail    Paronychia of great toe, right    Encounter for induction of labor    Vaginal bleeding affecting early pregnancy    Pregnancy resulting from in vitro fertilization in second trimester    Placenta previa antepartum

## 2025-07-24 NOTE — L&D DELIVERY NOTE
[facility]  Vaginal Delivery   Operative Note    SUMMARY     Normal spontaneous vaginal delivery of live infant, was placed on mothers abdomen for skin to skin and bulb suctioning performed.  Infant delivered position OA over perineum.  No shoulder dystocia.  Nuchal cord: No.    Spontaneous delivery of placenta and IV pitocin given noting good uterine tone.  Uterine sweep reveals no retained products of conception.  2nd degree laceration noted and repaired in normal fashion with 2-0 chromic, rectovaginal exam within normal limits.  Patient tolerated delivery well. Sponge needle and lap counted correctly x2.    Indications: Encounter for induction of labor  Pregnancy complicated by: Problem List[1]  Admitting GA: 39w0d    Viable male infant with Apgar scores 8/9, weight pending    cc    Delivery Information for Maurice Nava    Birth information:  YOB: 2027   Time of birth: 10:34 AM   Sex: male   Head Delivery Date/Time: 2025 10:34 AM   Delivery type: Vaginal, Spontaneous   Gestational Age: 39w0d       Delivery Providers    Delivering clinician:            Measurements    Weight:   Length:          Apgars    Living status:   Apgar Component Scores:  1 min.:  5 min.:  10 min.:  15 min.:  20 min.:    Skin color:         Heart rate:         Reflex irritability:         Muscle tone:         Respiratory effort:         Total:                                  Interventions/Resuscitation           Cord    No data filed       Placenta    Placenta delivery date/time:   Placenta removal:            Labor Events:       labor:       Labor Onset Date/Time:         Dilation Complete Date/Time:         Start Pushing Date/Time:         Start Pushing Date/Time:       Rupture Date/Time: 2518        Rupture type: ARM (Artificial Rupture)        Fluid Amount:       Fluid Color: Clear              steroids:       Antibiotics given for GBS:       Induction:        Indications for induction:        Augmentation:       Indications for augmentation:       Labor complications:       Additional complications:          Cervical ripening:                     Delivery:      Episiotomy: None     Indication for Episiotomy:       Perineal Lacerations: 2nd Repaired:  Yes   Periurethral Laceration:   Repaired:     Labial Laceration:   Repaired:     Sulcus Laceration:   Repaired:     Vaginal Laceration:   Repaired:     Cervical Laceration:   Repaired:     Repair suture:       Repair # of packets: 1     Last Value - EBL - Nursing (mL):       Sum - EBL - Nursing (mL): 0     Last Value - EBL - Anesthesia (mL):      Calculated QBL (mL): 270     Running total QBL (mL): 270     Vaginal Sweep Performed: Yes     Surgicount Correct: Yes       Other providers:            Details (if applicable):  Trial of Labor      Categorization:      Priority:     Indications for :     Incision Type:       Additional  information:  Forceps:    Vacuum:    Breech:    Observed anomalies    Other (Comments):              [1]   Patient Active Problem List  Diagnosis    Irritable bowel syndrome    Essential tremor    Family history of early CAD    Ingrown nail    Paronychia of great toe, right    Encounter for induction of labor    Vaginal bleeding affecting early pregnancy    Pregnancy resulting from in vitro fertilization in second trimester    Placenta previa antepartum

## 2025-07-24 NOTE — LACTATION NOTE
07/24/25 1318   Maternal Assessment   Breast Density Bilateral:;soft   Areola Bilateral:;elastic   Nipples Bilateral:;everted   Maternal Infant Feeding   Maternal Emotional State assist needed   Infant Positioning cradle   Signs of Milk Transfer audible swallow;infant jaw motion present   Pain with Feeding no   Comfort Measures Before/During Feeding infant position adjusted;latch adjusted;maternal position adjusted   Latch Assistance yes     Assisted to latch baby to right breast in cradle position. Baby latched deeply, nursing well with audible swallows. Mother denies pain during feeding. Reviewed basic breastfeeding instructions and encouraged to call me for any further breastfeeding assistance. Patient verbalizes understanding of all instructions with good recall.    Instructed on proper latch to facilitate effective breastfeeding.  Discussed recognizing hunger cues, appropriate positioning and wide mouth latch.  Discussed ways to determine an effective latch including:  areola included in latch, rhythmic/nutritive sucking and audible swallowing.  Also discussed soreness/tenderness associated with latch and prevention and treatment.  Pt states understanding and verbalized appropriate recall.

## 2025-07-25 VITALS
OXYGEN SATURATION: 96 % | DIASTOLIC BLOOD PRESSURE: 77 MMHG | HEART RATE: 77 BPM | SYSTOLIC BLOOD PRESSURE: 126 MMHG | RESPIRATION RATE: 16 BRPM | TEMPERATURE: 99 F | HEIGHT: 67 IN | WEIGHT: 202 LBS | BODY MASS INDEX: 31.71 KG/M2

## 2025-07-25 LAB
ABSOLUTE EOSINOPHIL (SMH): 0.18 K/UL
ABSOLUTE MONOCYTE (SMH): 0.84 K/UL (ref 0.3–1)
ABSOLUTE NEUTROPHIL COUNT (SMH): 7.9 K/UL (ref 1.8–7.7)
BASOPHILS # BLD AUTO: 0.05 K/UL
BASOPHILS NFR BLD AUTO: 0.5 %
ERYTHROCYTE [DISTWIDTH] IN BLOOD BY AUTOMATED COUNT: 13.3 % (ref 11.5–14.5)
HCT VFR BLD AUTO: 26.6 % (ref 37–48.5)
HGB BLD-MCNC: 8.9 GM/DL (ref 12–16)
IMM GRANULOCYTES # BLD AUTO: 0.12 K/UL (ref 0–0.04)
IMM GRANULOCYTES NFR BLD AUTO: 1.1 % (ref 0–0.5)
LYMPHOCYTES # BLD AUTO: 1.32 K/UL (ref 1–4.8)
MCH RBC QN AUTO: 30.6 PG (ref 27–31)
MCHC RBC AUTO-ENTMCNC: 33.5 G/DL (ref 32–36)
MCV RBC AUTO: 91 FL (ref 82–98)
NUCLEATED RBC (/100WBC) (SMH): 0 /100 WBC
PLATELET # BLD AUTO: 191 K/UL (ref 150–450)
PMV BLD AUTO: 10.4 FL (ref 9.2–12.9)
RBC # BLD AUTO: 2.91 M/UL (ref 4–5.4)
RELATIVE EOSINOPHIL (SMH): 1.7 % (ref 0–8)
RELATIVE LYMPHOCYTE (SMH): 12.6 % (ref 18–48)
RELATIVE MONOCYTE (SMH): 8 % (ref 4–15)
RELATIVE NEUTROPHIL (SMH): 76.1 % (ref 38–73)
WBC # BLD AUTO: 10.45 K/UL (ref 3.9–12.7)

## 2025-07-25 PROCEDURE — 25000003 PHARM REV CODE 250: Performed by: SPECIALIST

## 2025-07-25 PROCEDURE — 36415 COLL VENOUS BLD VENIPUNCTURE: CPT | Performed by: SPECIALIST

## 2025-07-25 PROCEDURE — 85025 COMPLETE CBC W/AUTO DIFF WBC: CPT | Performed by: SPECIALIST

## 2025-07-25 RX ORDER — OXYCODONE AND ACETAMINOPHEN 5; 325 MG/1; MG/1
1 TABLET ORAL EVERY 4 HOURS PRN
Qty: 10 TABLET | Refills: 0 | Status: SHIPPED | OUTPATIENT
Start: 2025-07-25

## 2025-07-25 RX ORDER — IBUPROFEN 800 MG/1
800 TABLET, FILM COATED ORAL EVERY 6 HOURS
Qty: 30 TABLET | Refills: 0 | Status: SHIPPED | OUTPATIENT
Start: 2025-07-25

## 2025-07-25 RX ADMIN — IBUPROFEN 600 MG: 400 TABLET ORAL at 01:07

## 2025-07-25 RX ADMIN — IBUPROFEN 600 MG: 400 TABLET ORAL at 06:07

## 2025-07-25 NOTE — PLAN OF CARE
VSS  Fundus firm and midline, below umbilicus with light lochia  Pain controlled with ibuprofen as needed  Annalise is ambulating independently, voiding and tolerating a regular diet  She is happy to be discharged today

## 2025-07-25 NOTE — ANESTHESIA POSTPROCEDURE EVALUATION
Anesthesia Post Evaluation    Patient: Annalise Nava    Procedure(s) Performed: * No procedures listed *    Final Anesthesia Type: epidural      Patient location during evaluation: labor & delivery  Patient participation: Yes- Able to Participate  Level of consciousness: awake and alert and oriented  Post-procedure vital signs: reviewed and stable  Pain management: adequate  Airway patency: patent    PONV status at discharge: No PONV  Anesthetic complications: no      Cardiovascular status: blood pressure returned to baseline, hemodynamically stable and stable  Respiratory status: unassisted, spontaneous ventilation and room air  Hydration status: euvolemic  Follow-up not needed.  Comments: Patient reports full return of motor and sensory to her lower extremities. No headache or backache. Ambulating without difficulty. No apparent anesthetic complications.                Vitals Value Taken Time   /77 07/25/25 08:05   Temp 37.2 °C (98.9 °F) 07/25/25 08:05   Pulse 77 07/25/25 08:05   Resp 16 07/25/25 08:05   SpO2 96 % 07/25/25 08:05         No case tracking events are documented in the log.      Pain/Fransisca Score: Pain Rating Prior to Med Admin: 3 (7/25/2025  6:31 AM)  Pain Rating Post Med Admin: 3 (7/25/2025  6:45 AM)

## 2025-07-25 NOTE — NURSING
Vaginal delivery discharge instructions given to Annalise, verbalizes understanding. Questions answered, no further questions. Prescriptions brought to room by pharmacy.

## 2025-07-25 NOTE — PLAN OF CARE
07/25/25 0907   Final Note   Assessment Type Final Discharge Note   Anticipated Discharge Disposition Home   What phone number can be called within the next 1-3 days to see how you are doing after discharge? [phone]   Post-Acute Status   Discharge Delays None known at this time     Discharge orders and chart reviewed with no further post-acute discharge needs identified at this time.  At this time, patient is cleared for discharge from Case Management standpoint.

## 2025-07-25 NOTE — DISCHARGE SUMMARY
"[facility]  Obstetrics  Discharge Summary      Patient Name: Annalise Nava  MRN: [id number]  Admission Date: 2025  Hospital Length of Stay: 1 days  Discharge Date and Time: 2025 7:42 AM  Attending Physician: Toby Chavez MD   Discharging Provider: Vinh Chavez MD   Primary Care Provider: Ene Rene MD    HPI: No notes on file        * No surgery found *     Hospital Course:   34-year-old  4 para  at 39 weeks  gestational age admitted for induction of labor.   Patient underwent spontaneous delivery, please see for details.  By postpartum day 1. Patient is requesting to home.  Patient states, bleeding minimal, ambulating urinating without difficulty passing flatus.  Physical exam on discharge is significant for abdomen is soft nontender, uterus firm below umbilicus, extremities with tender, lochia minimal      Final Active Diagnoses:    Diagnosis Date Noted POA    PRINCIPAL PROBLEM:  Encounter for induction of labor [Z34.90] 2022 Not Applicable      Problems Resolved During this Admission:        Significant Diagnostic Studies: Labs: CBC   Recent Labs   Lab 25  0046 25  0407   WBC 10.84 10.45   HGB 11.0* 8.9*   HCT 32.2* 26.6*    191     Lab Results   Component Value Date    GROUPTRH A POS 2025         Feeding Method: breast    Immunizations       Date Immunization Status Dose Route/Site Given by    25 1304 MMR Incomplete 0.5 mL Subcutaneous/     25 1304 Tdap Incomplete 0.5 mL Intramuscular/             Delivery:    Episiotomy: None   Lacerations: 2nd   Repair suture:     Repair # of packets: 1   Blood loss (ml):       Birth information:  YOB: 2027   Time of birth: 10:34 AM   Sex: male   Delivery type: Vaginal, Spontaneous   Gestational Age: 39w0d     Measurements    Weight: 3920 g  Weight (lbs): 8 lb 10.3 oz  Length: 52.1 cm  Length (in): 20.5"  Head circumference: 35.6 cm  Chest circumference: 34.5 " cm  Abdominal girth: 33 cm         Delivery Clinician: Delivery Providers    Delivering clinician: Toby Chavez MD   Provider Role    Ana Mack MA Delivery Nurse    Paula Erwin, RN Delivery Nurse    Naomy West RN Transition Nursery    Sharon York  Scrub Person    Clary Tran RN Transition Nursery    Cherelle Hernandez RN Registered Nurse             Additional  information:  Forceps:    Vacuum:    Breech:    Observed anomalies      Living?:     Apgars    Living status: Living  Apgar Component Scores:  1 min.:  5 min.:  10 min.:  15 min.:  20 min.:    Skin color:  0  1       Heart rate:  2  2       Reflex irritability:  2  2       Muscle tone:  2  2       Respiratory effort:  2  2       Total:  8  9       Apgars assigned by: CLARY TRAN RN         Placenta: Delivered:       appearance  Pending Diagnostic Studies:       None            Discharged Condition: good    Disposition: Home or Self Care    Follow Up:   Follow-up Information       Toby Chavez MD Follow up in 6 week(s).    Specialty: Obstetrics and Gynecology  Contact information:  [address] [place] [address]  [address] [facility] [address]  [phone]                           Patient Instructions:      Diet Adult Regular     Pelvic Rest     Activity as tolerated     Medications:  Current Discharge Medication List        START taking these medications    Details   oxyCODONE-acetaminophen (PERCOCET) 5-325 mg per tablet Take 1 tablet by mouth every 4 (four) hours as needed.  Qty: 10 tablet, Refills: 0    Comments: n/a   Associated Diagnoses: Pregnancy           CONTINUE these medications which have CHANGED    Details   ibuprofen (ADVIL,MOTRIN) 800 MG tablet Take 1 tablet (800 mg total) by mouth every 6 (six) hours.  Qty: 30 tablet, Refills: 0           STOP taking these medications       azithromycin (Z-USHA) 250 MG tablet Comments:   Reason for Stopping:         benzonatate (TESSALON) 200 MG capsule  Comments:   Reason for Stopping:         erythromycin (ROMYCIN) ophthalmic ointment Comments:   Reason for Stopping:         promethazine-dextromethorphan (PROMETHAZINE-DM) 6.25-15 mg/5 mL Syrp Comments:   Reason for Stopping:         tazarotene (TAZORAC) 0.1 % cream Comments:   Reason for Stopping:         triamcinolone acetonide 0.1% (KENALOG) 0.1 % cream Comments:   Reason for Stopping:               Vinh Chavez MD  [facility]

## 2025-07-25 NOTE — LACTATION NOTE
07/25/25 1130   Maternal Assessment   Breast Density soft   Areola elastic   Nipples everted   Right Nipple Symptoms blisters  (blister x1 per mom)   Maternal Infant Feeding   Maternal Emotional State independent   Infant Positioning cradle   Signs of Milk Transfer audible swallow;infant jaw motion present   Pain with Feeding no   Comfort Measures Following Feeding expressed milk applied;other (see comments)  (hydrogels given)   Latch Assistance no     Mom independently latched infant and infant nursing on right breast in cradle position. Deep latch observed with audible swallows. Mom reports infant has been nursing for 30 minutes. Mom denies pain with feeding, but reports that she has a blister on right nipple.   Discussed ways to determine an effective latch including:  areola included in latch, rhythmic/nutritive sucking and audible swallowing.  Also discussed soreness/tenderness associated with latch and prevention and treatment.  Mom states understanding and verbalized appropriate recall.   Discussed applying colostrum to nipple and allowing to air dry to promote healing.   Hydrogels also given and instructed on use. Mom verbalizes understanding with good recall.   Mom instructed to call me for further breastfeeding assistance prn.

## 2025-07-25 NOTE — DISCHARGE INSTRUCTIONS
Pelvic rest for 6 weeks (no sex, tampons, douching, nothing in the vagina)    You can experience vaginal bleeding on and off for up to 6 weeks, it will gradually get lighter and the color will change from bright red to a brownish discharge towards the end.    Activity:  NO strenuous activities or exercising.  Do not /lift anything over 15 pounds.   Do not do heavy housework or cleaning.    NO driving for 3 days.  You may take short car trips but do not drive.    You may shower.  Use a mild soap, no heavy perfumes or fragrances to avoid irritation.     If constipation develops:  You may take Colace (stool softener), Milk of Magnesia, Dulcolax or Miralax.  All of these medications are sold over the counter.      Care of Episiotomy:  Local agents such as Tucks pads & Dermoplast spray.  You may also use a Sitz bath: sitting in a tub of warm water for 15 minutes 2-3 times per day will help relieve the discomfort.      Pain Relief:  You may take Motrin for mild pain & uterine cramping.      Emotional Changes:  You may experience baby blues after delivery.  You may feel let down, anxious and cry easily.  This is normal.  These feelings can begin 2-3 days after delivery and usually disappear in about a week or two.  Prolonged sadness may indicate postpartum depression.      Call your doctor for any of the following:  Difficulty breathing, problems with any of your medications, inability to eat.    Foul smelling vaginal discharge.  Temperature above 100.4.    Heavy vaginal bleeding.  All women bleed different after delivery and each delivery is different.  Heavy bleeding consists of saturating a zara pad in a 1 hour time period.  Passing clots are normal, if you pass a blood clot larger than the size of a golf ball call your doctor's office.   If you experience pain in your legs/calves, if one leg increases in size and becomes swollen or becomes hot to touch or discolored.   Crying or periods of sadness beyond 2  weeks.      If you are breast feeding:  Wash your breasts with mild soap and warm water.  You should wear a supportive bra.  You should continue to take a prenatal vitamin for 6 weeks or until breastfeeding is discontinued.  If nipples are sore, apply a few drops of breast milk after nursing and let air dry or you can use Lanolin cream.   If breasts are engorged, apply warmth and express milk.

## 2025-07-28 ENCOUNTER — PATIENT OUTREACH (OUTPATIENT)
Dept: ADMINISTRATIVE | Facility: CLINIC | Age: 35
End: 2025-07-28
Payer: COMMERCIAL